# Patient Record
Sex: FEMALE | Race: WHITE | NOT HISPANIC OR LATINO | Employment: UNEMPLOYED | URBAN - METROPOLITAN AREA
[De-identification: names, ages, dates, MRNs, and addresses within clinical notes are randomized per-mention and may not be internally consistent; named-entity substitution may affect disease eponyms.]

---

## 2017-01-06 ENCOUNTER — TRANSFERRED RECORDS (OUTPATIENT)
Dept: HEALTH INFORMATION MANAGEMENT | Facility: CLINIC | Age: 62
End: 2017-01-06

## 2017-01-06 DIAGNOSIS — G25.0 ESSENTIAL TREMOR: Primary | ICD-10-CM

## 2017-02-08 ENCOUNTER — DOCUMENTATION ONLY (OUTPATIENT)
Dept: NEUROSURGERY | Facility: CLINIC | Age: 62
End: 2017-02-08

## 2017-02-21 ENCOUNTER — TELEPHONE (OUTPATIENT)
Dept: NEUROSURGERY | Facility: CLINIC | Age: 62
End: 2017-02-21

## 2017-02-21 NOTE — TELEPHONE ENCOUNTER
Received VM from pt letting me know she received the preop packet. Left her a VM and asked her to call me at her earliest convenience so we can review the contents of the packet and any questions she may have. Left my direct phone number.

## 2017-02-24 ENCOUNTER — CARE COORDINATION (OUTPATIENT)
Dept: NEUROSURGERY | Facility: CLINIC | Age: 62
End: 2017-02-24

## 2017-02-24 NOTE — PROGRESS NOTES
Pre-op Teaching by phone           Pre-op folder with specific written instructions    Surgeries: DBS lead placement/IPG placement  Dates/times: 3/14/17 at 8:00, 3/24/17 at 9:00  Surgeon: Dr. Javier    Discussed pre-op routine and requirements to include:  surgical procedure, post-op recovery and expectations, need for H&P/PAC (3/13/17), NPO prior to OR, pre-op antibacterial showers, pain control and importance of follow-up visits.  Surgery scheduling will coordinate OR time/date and update patient as appropriate.  3C will call with more instructions 24-48 hour pre-op.   Ample time was provided for patient questions and in-depth discussion of topics of heightened interest.  Antibacterial soap solution for each surgery will be given to patient; discussed specific instructions for use. Pt to hold tremor medications (primidone and gabapentin) the morning of 3/14/17. She may take it prior to the 3/24 surgery.  Patient verbalized understanding of instructions.  Approximately 20 minutes spent with patient  discussing and reviewing.

## 2017-03-09 ENCOUNTER — ANESTHESIA EVENT (OUTPATIENT)
Dept: SURGERY | Facility: CLINIC | Age: 62
DRG: 027 | End: 2017-03-09
Payer: COMMERCIAL

## 2017-03-13 ENCOUNTER — OFFICE VISIT (OUTPATIENT)
Dept: SURGERY | Facility: CLINIC | Age: 62
End: 2017-03-13

## 2017-03-13 ENCOUNTER — ALLIED HEALTH/NURSE VISIT (OUTPATIENT)
Dept: SURGERY | Facility: CLINIC | Age: 62
End: 2017-03-13

## 2017-03-13 VITALS
HEART RATE: 100 BPM | SYSTOLIC BLOOD PRESSURE: 112 MMHG | TEMPERATURE: 98.9 F | DIASTOLIC BLOOD PRESSURE: 76 MMHG | HEIGHT: 67 IN | BODY MASS INDEX: 29.66 KG/M2 | RESPIRATION RATE: 14 BRPM | OXYGEN SATURATION: 96 % | WEIGHT: 189 LBS

## 2017-03-13 DIAGNOSIS — Z01.818 PREOP EXAMINATION: Primary | ICD-10-CM

## 2017-03-13 DIAGNOSIS — Z01.818 PREOPERATIVE EVALUATION TO RULE OUT SURGICAL CONTRAINDICATION: ICD-10-CM

## 2017-03-13 DIAGNOSIS — Z01.818 PREOPERATIVE EVALUATION TO RULE OUT SURGICAL CONTRAINDICATION: Primary | ICD-10-CM

## 2017-03-13 DIAGNOSIS — G25.0 ESSENTIAL TREMOR: ICD-10-CM

## 2017-03-13 LAB
ALBUMIN UR-MCNC: NEGATIVE MG/DL
ANION GAP SERPL CALCULATED.3IONS-SCNC: 10 MMOL/L (ref 3–14)
APPEARANCE UR: CLEAR
APTT PPP: 25 SEC (ref 22–37)
BACTERIA #/AREA URNS HPF: ABNORMAL /HPF
BILIRUB UR QL STRIP: NEGATIVE
BUN SERPL-MCNC: 12 MG/DL (ref 7–30)
CALCIUM SERPL-MCNC: 8.7 MG/DL (ref 8.5–10.1)
CHLORIDE SERPL-SCNC: 104 MMOL/L (ref 94–109)
CO2 SERPL-SCNC: 27 MMOL/L (ref 20–32)
COLOR UR AUTO: ABNORMAL
CREAT SERPL-MCNC: 0.52 MG/DL (ref 0.52–1.04)
ERYTHROCYTE [DISTWIDTH] IN BLOOD BY AUTOMATED COUNT: 13.1 % (ref 10–15)
GFR SERPL CREATININE-BSD FRML MDRD: ABNORMAL ML/MIN/1.7M2
GLUCOSE SERPL-MCNC: 104 MG/DL (ref 70–99)
GLUCOSE UR STRIP-MCNC: NEGATIVE MG/DL
HCT VFR BLD AUTO: 38 % (ref 35–47)
HGB BLD-MCNC: 13.3 G/DL (ref 11.7–15.7)
HGB UR QL STRIP: NEGATIVE
INR PPP: 0.99 (ref 0.86–1.14)
KETONES UR STRIP-MCNC: NEGATIVE MG/DL
LEUKOCYTE ESTERASE UR QL STRIP: ABNORMAL
MCH RBC QN AUTO: 30.2 PG (ref 26.5–33)
MCHC RBC AUTO-ENTMCNC: 35 G/DL (ref 31.5–36.5)
MCV RBC AUTO: 86 FL (ref 78–100)
MUCOUS THREADS #/AREA URNS LPF: PRESENT /LPF
NITRATE UR QL: NEGATIVE
PH UR STRIP: 6 PH (ref 5–7)
PLATELET # BLD AUTO: 194 10E9/L (ref 150–450)
POTASSIUM SERPL-SCNC: 4.2 MMOL/L (ref 3.4–5.3)
RBC # BLD AUTO: 4.4 10E12/L (ref 3.8–5.2)
RBC #/AREA URNS AUTO: 1 /HPF (ref 0–2)
SODIUM SERPL-SCNC: 141 MMOL/L (ref 133–144)
SP GR UR STRIP: 1.01 (ref 1–1.03)
SQUAMOUS #/AREA URNS AUTO: <1 /HPF (ref 0–1)
URN SPEC COLLECT METH UR: ABNORMAL
UROBILINOGEN UR STRIP-MCNC: 0 MG/DL (ref 0–2)
WBC # BLD AUTO: 6.1 10E9/L (ref 4–11)
WBC #/AREA URNS AUTO: 1 /HPF (ref 0–2)

## 2017-03-13 NOTE — H&P
Pre-Operative H & P     Date of Encounter: 3/13/2017  Primary Care Physician:  Zachary Holguin    CC: Essential tremor.    HPI:  Hilary Cornelius is a 62 year old female who presents for pre-operative H & P in preparation for Patricia-Assisted Left Side Deep Brain Stimulator Placement, Phase I, Placement of Deep Brain Stimulator Electrode, Target Left Ventral Intermediate Nucleus of the Thalamus, with Microelectrode Recording on 3/14/17 with Dr. Henok Javier at [facility].     The patient was evaluated by Dr. Javier on 11/10/16 in regards to a long history of essential tremor that has not responded to treatment with medications.  The tremor involves her head, hand, and her voice.  It makes it difficult for her to eat, drink, or write her name.  At the 11/10 visit, they discussed the phases of the DBS implantation surgery in detail.  Her case was discussed at the Movement Disorder Consensus Group Meeting and it was determined that she is a candidate for the surgery.  Arrangements are now being made for the above procedures.  History is obtained from the patient and the medical record.    Past Medical History:  Past Medical History   Diagnosis Date     Chronic pain 11/8/2016     Cognitive disorder 12/4/2016     2016 evaluation   IMPRESSIONS AND RECOMMENDATIONS  Current results indicate overall intellectual functioning estimated fall in the mildly impaired range, marginally below premorbid estimates of low average based on single word reading abilities. She demonstrates a relative inefficiency in learning of new material, but retains information quite well once she has learned it. Visual-spatial abilities     Controlled type 2 diabetes mellitus without complication (H) 12/19/2008     Exercise-induced asthma      Gastroesophageal reflux disease 3/30/2015     XR ESOPHAGRAM7/8/2016  [facility]  Result Narrative  This document is currently in Final Status  Exam Accession#  [id number]  XR ESOPHAGRAM  CLINICAL INFORMATION: Dysphagia;   COMPARISON: None.  FINDINGS: The esophagus is normal in course and caliber in this post gastric bypass patient. No intrinsic or extrinsic mass lesions are identified. No strictures. There is a small hiatal hernia. Gastroesophageal reflux is observed during this examination  with numerous tertiary contractions and delayed esophageal clearance.  IMPRESSION:  1. Gastroesophageal reflux with numerous tertiary contractions and delayed esophageal clearance. 2. Sliding hiatal hernia in this post gastric bypass patient.  Dictated By: Dandre Wyatt MD 7/8/2016 9:47 AM Edited By: MIKE 7/8/2016 10:02 AM  Electronically Signed: Dandre Wyatt MD 7/8/2016 4:24 PM   Status Results Details         H/O bariatric surgery 11/08/2016     Headache disorder 11/05/2016     Heart murmur 11/8/2016     Rheumatic fever 11/8/2016     Tremor 11/5/2016     Past Surgical History:  Past Surgical History   Procedure Laterality Date     Thoracic spine surgery       Colonoscopy       Cholecystectomy       Appendectomy       Tubal ligation       Gastric bypass       Hx of Blood transfusions/reactions: Denies.     Hx of abnormal bleeding or anti-platelet use: Denies.    Menstrual history: No LMP recorded. Patient is postmenopausal.    Steroid use in the last year: Denies.    Personal or FH of difficulty with anesthesia: Denies.    Prior to admission medications  Current Outpatient Prescriptions   Medication Sig Dispense Refill     diphenhydrAMINE-acetaminophen (TYLENOL PM)  MG tablet Take 1 tablet by mouth as needed       primidone (MYSOLINE) 250 MG tablet 1 in am and 2 in the evening       albuterol (PROAIR HFA, PROVENTIL HFA, VENTOLIN HFA) 108 (90 BASE) MCG/ACT inhaler Inhale 2 puffs into the lungs every 6 hours       gabapentin (NEURONTIN) 600 MG tablet Take 600 mg by mouth 3 times daily       calcium carbonate (OS-GISELA 600 MG [place]. [place]) 600 MG tablet Take 600 mg by mouth daily        Multiple vitamin TABS Take 1 tablet by mouth daily       [DISCONTINUED] albuterol (VENTOLIN HFA) 108 (90 BASE) MCG/ACT inhaler INHALE 1 TO 2 PUFFS INTO THE LUNGS EVERY 6 HOURS AS NEEDED FOR SHORTNESS OF BREATH. SHAKE BEFORE USING.       cyabnocobalamin (VITAMIN B-12) 2500 MCG sublingual tablet Place 2,500 mcg under the tongue daily       folic acid (FOLVITE) 400 MCG tablet Take 400 mcg by mouth daily       Allergies  Allergies   Allergen Reactions     Asa [Aspirin] Unknown     Bee Venom Swelling     Penicillins      rash     Social History  Social History     Social History     Marital status:      Spouse name: N/A     Number of children: N/A     Years of education: N/A     Occupational History     Not on file.     Social History Main Topics     Smoking status: Never Smoker     Smokeless tobacco: Never Used     Alcohol use 0.0 oz/week     0 Standard drinks or equivalent per week      Comment: social     Drug use: Not on file     Sexual activity: Not on file     Other Topics Concern     Not on file     Social History Narrative    from [facility].  to Adilson Siddiqui        [facility] HISTORY: The patient was born in [place], [place]. The patient was educated formally through 12 years. The patient is presently disabled due to her spinal difficulties. The patient has been  40 years. The patient has 3 children of that marriage. The patient does not use tobacco. The patient has social use of alcohol. The patient has no history of drug or intravenous drug use or abuse.         FAMILY HISTORY: A strong family history of tremor with mother having tremor, but also a strong paternal family history of tremor with father with multiple cousins. Patient does have one cousin with Parkinson disease. The patients father suffered with dementia of the Alzheimer type.     2 Daughters and son    Daughter lives 20 miles away in [place] from her her    Daughter lives in [place]    Son lives in [place] [facility]  "[place]        Staying at the [facility]     Family History  Family History   Problem Relation Age of Onset     Tremor Mother      Cardiomyopathy Mother      had surgery at [facility]     Dementia Father      Alzheimer's disease     Parkinsonism Other      paternal aunt's son - cousin     Tremor Daughter      Review of Systems  Functional status: Independent in ADL's.  >4 METS.     The complete review of systems is negative other than noted in the HPI or here.   Constitutional: Denies recent changes in weight, sleeping patterns, or fevers/chills.  Eyes: Glasses for vision correction.  No recent vision changes.  EENT: Denies recent changes in hearing, mouth pain, or difficulty swallowing.  Cardiovascular: Denies chest pain, CORCORAN or orthopnea, or palpitations.  Respiratory: Denies shortness of breath or significant cough.    GI: Denies nausea/vomiting or diarrhea/constipation.    : Denies dysuria.    Musculoskeletal: Denies joint pain or swelling, with the exception of chronic back and right knee pain.    Skin: Denies rashes or wounds.    Hematologic: Denies easy bruising or bleeding.    Neurologic: Denies migraines, seizures, dizziness, numbness/tingling.  Psychiatric: Denies changes in mood or affect.      /76  Pulse 100  Temp 98.9  F (37.2  C) (Oral)  Resp 14  Ht 1.689 m (5' 6.5\")  Wt 85.7 kg (189 lb)  SpO2 96%  BMI 30.05 kg/m2    189 lbs 0 oz  5' 6.5\"   Body mass index is 30.05 kg/(m^2).    Physical Exam  Constitutional: Patient awake, seated upright in a chair, in no apparent distress.  Appears stated age.  Eyes: Pupils equal, round and reactive to light.  Extra ocular muscles intact. Sclera clear.  Conjunctiva normal.  HENT: Head normocephalic.  Oral pharynx intact with moist mucous membranes.  Dentition with multiple missing teeth.  No thyromegaly appreciated.   Respiratory: Lung sounds clear to auscultation bilaterally.  No rales, rhonchi, or wheezing noted.    Cardiovascular: S1, S2, regular " rate and rhythm.  No rubs, or gallops noted. 1/6 murmur noted.  No carotid bruits auscultated.  Radial and pedal pulses palpable, bilaterally.  Trace pedal edema noted, bilaterally.  GI: Bowel sounds present.  Abdomen rounded, soft, non-tender to light palpation.  No hepatosplenomegaly or masses palpated.   Genitourinary: Exam deferred.  Lymph/Hematologic: No cervical or supraclavicular lymphadenopathy noted.  No excessive bruising noted.    Skin: Color appropriate for race, warm, dry.  No rashes or wounds at anticipated surgical site.   Musculoskeletal: Full extension of the neck.  No redness, warmth, or swelling of the joints noted. Gross motor strength is normal.    Neurologic: Persistent tremor of the head and both upper extremities noted.  Alert, oriented to name, place and time. Cranial nerves II-XII are grossly intact. Gait is normal.      Neuropsychiatric: Calm, cooperative. Normal affect.     Labs:  3/13/17: WBC 6.1; Hgb 13.3; Hct 38; Plt 194; 0.99 INR; 25 PTT  3/13/17: Na 141; K 4.2; Cl 104; Glu 104; BUN 12; Cr 0.52; Ca 8.7    Imagin2015 Myocardial Perfusion:  FINDINGS: Myocardial perfusion scintigraphy is performed utilizing 11 and 30.8 mCi technetium 99m sestamibi IV for rest and stress doses respectively. Lexiscan infusion was performed. Pre and post infusion heart rates are measured at 63 and 82 beats per   minute respectively. A blood pressure is measured at 127/81 mmHg. During the infusion, the patient experienced chest tightness which resolved with 50 mg Aminophyllin IV. Left ventricular ejection fraction is calculated at 67%. No focal wall motion   abnormalities. No definite fixed or reversible perfusion abnormalities.    IMPRESSION: Low-risk examination. Normal left ventricular ejection fraction without a definite fixed or reversible perfusion abnormality.    Lab results were personally reviewed by this provider.      Outside records from [facility] reviewed.    Assessment and  Plan  Hilary Cornelius is a 62 year old female scheduled to undergo Patricia-Assisted Left Side Deep Brain Stimulator Placement, Phase I, Placement of Deep Brain Stimulator Electrode, Target Left Ventral Intermediate Nucleus of the Thalamus, with Microelectrode Recording on 3/14/17 with Dr. Henok Javier.    She has the following specific operative considerations:   1.  Hiatal hernia with occasional GERD symptoms: Consider use of RSI techniques should advanced airway maneuvers be required.  2.  Asthma: Consider administration of a bronchodilator in the perioperative setting, if needed.    3.  Essential tremor involving the head and upper extremities: Recommend that extra caution be exercised during transfers and with positioning in order to prevent injury.    4.  History of rheumatic fever, presence of a cardiac murmur: SBE prophylaxis recommended.  5.  Diabetes: Recommend close monitoring of blood glucose levels throughout the perioperative period.  6.  Labs as ordered by Dr. Javier.    Revised Cardiac Risk Index: 0.4% risk of major adverse cardiac event.  Anesthesia considerations: Refer to PAC assessment in the anesthesia records.  VTE risk: 1.8%  ANNA MARIE risk: Low  PONV risk score= 2.  (If > 2, anti-emetic intervention is recommended.)    Patient was discussed with Dr. Murillo.    Mimi Putnam NP  Preoperative Assessment Center  [facility]  Phone: [phone]  Fax: [phone]

## 2017-03-13 NOTE — PATIENT INSTRUCTIONS
Preparing for Your Surgery      Name:  Hilary Cornelius   MRN:  [id number]   :  1955   Today's Date:  3/13/2017     Arriving for surgery:  Surgery date:  3/14/17  Surgery time:  08:00 am  Arrival time:  06:00 am  Please come to:       [facility] [address]  [address] [place]  [address]    -   parking is available in front of the hospital from 5:15 am to 8:00 pm    -  Stop at the Information Desk in the lobby    -   Inform the information person that you are here for surgery. An escort to 3c will be provided. If you would not like an escort, please proceed to 3C on the 3rd floor. [phone]     What can I eat or drink?  -  You may have solid food or milk products until 8 hours prior to your surgery.  -  You may have water, apple juice or 7up/Sprite until 2 hours prior to your surgery.    Which medicines can I take?  -  Do NOT take these medications in the morning, the day of surgery:  Aspirin and ibuprofen x 7 days before surgery, supplements, Parkinson's medications if normally taken in the morning.    -  Please take these medications the day of surgery:  Tylenol if needed, gabapentin if normally taken in the morning.  Use albuterol inhaler and bring to surgery.    How do I prepare myself?  -  Take two showers: one the night before surgery; and one the morning of surgery.         Use Scrubcare or Hibiclens to wash from neck down.  You may use your own shampoo and conditioner. No other hair products.   -  Do NOT use lotion, powder, deodorant, or antiperspirant the day of your surgery.  -  Do NOT wear any makeup, fingernail polish or jewelry.  -  Begin using Incentive Spirometer 1 week prior to surgery.  Use 4 times per day, up to 5-10 breaths each time.  Bring Incentive Spirometer to hospital.  -Do not bring your own medications to the hospital, except for inhalers and eye drops.  -  Bring your ID and insurance card.    Questions or Concerns:  If you have  questions or concerns, please call the  Preoperative Assessment Center, Monday-Friday 7AM-7PM:  [phone]

## 2017-03-13 NOTE — MR AVS SNAPSHOT
After Visit Summary   3/13/2017    Hilary Cornelius    MRN: [id number]           Patient Information     Date Of Birth          1955        Visit Information        Provider Department      3/13/2017 2:00 PM Rn, [facility] Preoperative Assessment Center        Care Instructions    Preparing for Your Surgery      Name:  Hilary Cornelius   MRN:  [id number]   :  1955   Today's Date:  3/13/2017     Arriving for surgery:  Surgery date:  3/14/17  Surgery time:  08:00 am  Arrival time:  06:00 am  Please come to:       [facility] [address]  [address] [id number]    -   parking is available in front of the hospital from 5:15 am to 8:00 pm    -  Stop at the Information Desk in the lobby    -   Inform the information person that you are here for surgery. An escort to 3c will be provided. If you would not like an escort, please proceed to 3C on the 3rd floor. [phone]     What can I eat or drink?  -  You may have solid food or milk products until 8 hours prior to your surgery.  -  You may have water, apple juice or 7up/Sprite until 2 hours prior to your surgery.    Which medicines can I take?  -  Do NOT take these medications in the morning, the day of surgery:  Aspirin and ibuprofen x 7 days before surgery, supplements, Parkinson's medications if normally taken in the morning.    -  Please take these medications the day of surgery:  Tylenol if needed, gabapentin if normally taken in the morning.  Use albuterol inhaler and bring to surgery.    How do I prepare myself?  -  Take two showers: one the night before surgery; and one the morning of surgery.         Use Scrubcare or Hibiclens to wash from neck down.  You may use your own shampoo and conditioner. No other hair products.   -  Do NOT use lotion, powder, deodorant, or antiperspirant the day of your surgery.  -  Do NOT wear any makeup, fingernail polish or jewelry.  -  Begin  using Incentive Spirometer 1 week prior to surgery.  Use 4 times per day, up to 5-10 breaths each time.  Bring Incentive Spirometer to hospital.  -Do not bring your own medications to the hospital, except for inhalers and eye drops.  -  Bring your ID and insurance card.    Questions or Concerns:  If you have questions or concerns, please call the  Preoperative Assessment Center, Monday-Friday 7AM-7PM:  [phone]                  Follow-ups after your visit        Your next 10 appointments already scheduled     Mar 13, 2017  3:30 PM CDT   (Arrive by 3:15 PM)   PAC Anesthesia Consult with  Pac Anesthesiologist   [facility] Preoperative Assessment Center ([facility])    [address]  4th Floor  [facility] [id number] [phone]            Mar 14, 2017   Procedure with Henok Javier MD   [facility], Same Day Surgery (--)    [address] [id number] [phone]            Mar 14, 2017  8:40 AM CDT   CT HEAD W/O CONTRAST with UUCT1   [facility], [place], [place] ([facility])    [address] [id number] [phone]           Please bring any scans or X-rays taken at other hospitals, if similar tests were done. Also bring a list of your medicines, including vitamins, minerals and over-the-counter drugs. It is safest to leave personal items at home.  Be sure to tell your doctor:   If you have any allergies.   If there s any chance you are pregnant.   If you are breastfeeding.   If you have any special needs.  You do not need to do anything special to prepare.  Please wear loose clothing, such as a sweat suit or jogging clothes. Avoid snaps, zippers and other metal. We may ask you to undress and put on a hospital gown.            Mar 24, 2017   Procedure with Henok Javier MD   [facility], Same Day Surgery (--)    [address] [id number] [phone]              Future  tests that were ordered for you today     Open Future Orders        Priority Expected Expires Ordered    CBC with platelets Routine  3/13/2018 3/13/2017    Basic metabolic panel Routine  3/13/2018 3/13/2017    UA with Microscopic reflex to Culture Routine  3/13/2018 3/13/2017    INR Routine  3/13/2018 3/13/2017    Partial thromboplastin time Routine  3/13/2018 3/13/2017            Who to contact     Please call your clinic at [phone] to:    Ask questions about your health    Make or cancel appointments    Discuss your medicines    Learn about your test results    Speak to your doctor   If you have compliments or concerns about an experience at your clinic, or if you wish to file a complaint, please contact [facility] Patient Relations at [phone] or email us at [email or web address]         Additional Information About Your Visit        [email or web address] Information     [employer] gives you secure access to your electronic health record. If you see a primary care provider, you can also send messages to your care team and make appointments. If you have questions, please call your primary care clinic.  If you do not have a primary care provider, please call [phone] and they will assist you.      [employer] is an electronic gateway that provides easy, online access to your medical records. With [employer], you can request a clinic appointment, read your test results, renew a prescription or communicate with your care team.     To access your existing account, please contact your [facility] or call [phone] for assistance.        Care EveryWhere ID     This is your Care EveryWhere ID. This could be used by other organizations to access your [facility] medical records  [id number]         Blood Pressure from Last 3 Encounters:   03/13/17 112/76   11/10/16 147/78   11/09/16 146/75    Weight from Last 3 Encounters:   03/13/17 85.7 kg (189 lb)   11/10/16 89.4  kg (197 lb)   11/09/16 89.4 kg (197 lb)              Today, you had the following     No orders found for display       Primary Care Provider Office Phone # Fax #    Zachary Holguin [phone]       [facility] [address] [address]        Thank you!     Thank you for choosing [facility] PREOPERATIVE ASSESSMENT CENTER  for your care. Our goal is always to provide you with excellent care. Hearing back from our patients is one way we can continue to improve our services. Please take a few minutes to complete the written survey that you may receive in the mail after your visit with us. Thank you!             Your Updated Medication List - Protect others around you: Learn how to safely use, store and throw away your medicines at www.disposemymeds.org.          This list is accurate as of: 3/13/17  3:02 PM.  Always use your most recent med list.                   Brand Name Dispense Instructions for use    albuterol 108 (90 BASE) MCG/ACT Inhaler    PROAIR HFA/PROVENTIL HFA/VENTOLIN HFA     Inhale 2 puffs into the lungs every 6 hours       calcium carbonate 600 MG tablet    OS-GISELA 600 mg [place]. [place]     Take 600 mg by mouth daily       cyabnocobalamin 2500 MCG sublingual tablet    VITAMIN B-12     Place 2,500 mcg under the tongue daily       diphenhydrAMINE-acetaminophen  MG tablet    TYLENOL PM     Take 1 tablet by mouth as needed       folic acid 400 MCG tablet    FOLVITE     Take 400 mcg by mouth daily       gabapentin 600 MG tablet    NEURONTIN     Take 600 mg by mouth 3 times daily       Multiple vitamin Tabs      Take 1 tablet by mouth daily       primidone 250 MG tablet    MYSOLINE     1 in am and 2 in the evening

## 2017-03-13 NOTE — ANESTHESIA PREPROCEDURE EVALUATION
"Anesthesia Evaluation     . Pt has had prior anesthetic. Type: General and MAC    No history of anesthetic complications     ROS/MED HX    ENT/Pulmonary:     (+)Intermittent asthma Last exacerbation: 1 month ago,Treatment: Inhaler prn,  , . .    Neurologic:     (+)other neuro Essential tremor, effecting the head and both arms    Cardiovascular:     (+) ----. : . . . :. valvular problems/murmurs History of rheumatic fever:. No previous cardiac testing      (-) taking anticoagulants/antiplatelets   METS/Exercise Tolerance:  >4 METS   Hematologic:  - neg hematologic  ROS       Musculoskeletal:   (+) , , other musculoskeletal- 3rd digit in the left hand, S/P joint replacement, so it does not fully extend or bend.  Chronic back and right knee pain.  +      GI/Hepatic:     (+) GERD Other, hiatal hernia, Other GI/Hepatic History of bariatric surgery      Renal/Genitourinary:  - ROS Renal section negative       Endo:     (+) type II DM Not using insulin - not using insulin pump Normal glucose range: Diet controlled, states that blood glucose levels are \"good\" not previously admitted for DM/DKA .      Psychiatric:  - neg psychiatric ROS       Infectious Disease:   (+) SBE Prophylaxis,       Malignancy:      - no malignancy   Other:    (+) H/O Chronic Pain,             Physical Exam      Airway   Mallampati: III  TM distance: >3 FB  Neck ROM: full    Dental   (+) missing    Cardiovascular   Rhythm and rate: regular and normal  (+) murmur   (-) carotid bruit is not present and no peripheral edema  PE comment: 1/6 cardiac murmur    Pulmonary    breath sounds clear to auscultation    Other findings: 3/13/17: WBC 6.1; Hgb 13.3; Hct 38; Plt 194; 0.99 INR; 25 PTT  3/13/17: Na 141; K 4.2; Cl 104; Glu 104; BUN 12; Cr 0.52; Ca 8.7    7/8/2015 Myocardial Perfusion:  FINDINGS: Myocardial perfusion scintigraphy is performed utilizing 11 and 30.8 mCi technetium 99m sestamibi IV for rest and stress doses respectively. Lexiscan infusion was " performed. Pre and post infusion heart rates are measured at 63 and 82 beats per   minute respectively. A blood pressure is measured at 127/81 mmHg. During the infusion, the patient experienced chest tightness which resolved with 50 mg Aminophyllin IV. Left ventricular ejection fraction is calculated at 67%. No focal wall motion   abnormalities. No definite fixed or reversible perfusion abnormalities.    IMPRESSION: Low-risk examination. Normal left ventricular ejection fraction without a definite fixed or reversible perfusion abnormality.             PAC Discussion and Assessment    ASA Classification: 3  Case is suitable for: [place]  Anesthetic techniques and relevant risks discussed: MAC with GA as backup  Invasive monitoring and risk discussed: Yes  Types:   Possibility and Risk of blood transfusion discussed: Yes  NPO instructions given:   Additional anesthetic preparation and risks discussed:   Needs early admission to pre-op area:   Other:     PAC Resident/NP Anesthesia Assessment:  Hilary Cornelius is a 62 year old female scheduled to undergo Patricia-Assisted Left Side Deep Brain Stimulator Placement, Phase I, Placement of Deep Brain Stimulator Electrode, Target Left Ventral Intermediate Nucleus of the Thalamus, with Microelectrode Recording on 3/14/17 with Dr. Henok Javier.    She has the following specific operative considerations:   1.  Hiatal hernia with occasional GERD symptoms: Consider use of RSI techniques should advanced airway maneuvers be required.  2.  Asthma: Consider administration of a bronchodilator in the perioperative setting, if needed.    3.  Essential tremor involving the head and upper extremities: Recommend that extra caution be exercised during transfers and with positioning in order to prevent injury.    4.  History of rheumatic fever, presence of a cardiac murmur: SBE prophylaxis recommended.  5.  Diabetes: Recommend close monitoring of blood glucose levels throughout the  perioperative period.  6.  Labs as ordered by Dr. Javier.    Revised Cardiac Risk Index: 0.4% risk of major adverse cardiac event.  Anesthesia considerations: Refer to PAC assessment in the anesthesia records.  VTE risk: 1.8%  ANNA MARIE risk: Low  PONV risk score= 2.  (If > 2, anti-emetic intervention is recommended.)      Reviewed and Signed by PAC Mid-Level Provider/Resident  Mid-Level Provider/Resident: Mimi Putnam CNP  Date: 3/13/17  Time: 1620    Attending Anesthesiologist Anesthesia Assessment:  STAFF:  62 y.o. woman with significant bilateral tremor disease for DBS electrode placement by Dr. Javier using MAC anesthesia.   History summarized above.   #Hx of rheumatic fever, requires endocardial coverage  # Recent negative cardiac work up  Instructions given and questions answered.   Final plans by anesthesiology team on DOS.   ---rcp      Reviewed and Signed by PAC Anesthesiologist  Anesthesiologist: rcp  Date: 3/13  Time:   Pass/Fail: Pass  Disposition:     PAC Pharmacist Assessment:        Pharmacist:   Date:   Time:      Anesthesia Plan      History & Physical Review      ASA Status:  2 .    NPO Status:  > 8 hours    Plan for MAC with Intravenous induction. Maintenance will be TIVA.    PONV prophylaxis:  Ondansetron (or other 5HT-3)  Additional equipment: 2nd IV      Postoperative Care  Postoperative pain management:  IV analgesics and Oral pain medications.      Consents        ANESTHESIA PREOP EVALUATION    Procedure: Procedure(s):  Stealth Assisted Left Side Deep Brain Stimulator Placement, Phase I, Placement Of Deep Brain Stimulator Electrode, Target Left Ventral Intermediate Nucleus Of The Thalamus, With Microelectrode Recording - Wound Class:     HPI: Hilary Cornelius is a 62 year old female with diet controlled DM2, GERD, and essential tremor presenting for above procedure.     PMHx/PSHx/ROS:  Past Medical History   Diagnosis Date     Chronic pain 11/8/2016     Cognitive disorder 12/4/2016     2016  evaluation   IMPRESSIONS AND RECOMMENDATIONS  Current results indicate overall intellectual functioning estimated fall in the mildly impaired range, marginally below premorbid estimates of low average based on single word reading abilities. She demonstrates a relative inefficiency in learning of new material, but retains information quite well once she has learned it. Visual-spatial abilities     Controlled type 2 diabetes mellitus without complication (H) 12/19/2008     Gastroesophageal reflux disease 3/30/2015     XR ESOPHAGRAM7/8/2016    Result Narrative  This document is currently in Final Status  Exam Accession# [id number]  XR ESOPHAGRAM  CLINICAL INFORMATION: Dysphagia;   COMPARISON: None.  FINDINGS: The esophagus is normal in course and caliber in this post gastric bypass patient. No intrinsic or extrinsic mass lesions are identified. No strictures. There is a small hiatal hernia. Gastroesophageal reflux is observed during this examination  with numerous tertiary contractions and delayed esophageal clearance.  IMPRESSION:  1. Gastroesophageal reflux with numerous tertiary contractions and delayed esophageal clearance. 2. Sliding hiatal hernia in this post gastric bypass patient.  Dictated By: Dandre Wyatt MD 7/8/2016 9:47 AM Edited By: MIKE 7/8/2016 10:02 AM  Electronically Signed: Dandre Wyatt MD 7/8/2016 4:24 PM   Status Results Details         H/O bariatric surgery 11/08/2016     Headache disorder 11/05/2016     Heart murmur 11/8/2016     Rheumatic fever 11/8/2016     Tremor 11/5/2016       Past Surgical History   Procedure Laterality Date     Thoracic spine surgery       Colonoscopy       Cholecystectomy       Appendectomy       Tubal ligation       Gastric bypass           Past Anes Hx: No personal or family h/o anesthesia problems    Soc Hx:   Social History   Substance Use Topics     Smoking status: Never Smoker     Smokeless tobacco: Never Used     Alcohol use 0.0 oz/week     0 Standard  drinks or equivalent per week      Comment: social       Allergies:   Allergies   Allergen Reactions     Bee Venom Swelling     Penicillins      rash       Meds:   No prescriptions prior to admission.       Current Outpatient Prescriptions   Medication Sig Dispense Refill     calcium carbonate (OS-GISELA 600 MG [place]. [place]) 600 MG tablet Take 600 mg by mouth daily       clonazePAM (KLONOPIN) 0.5 MG tablet Take 1 tablet by mouth At Bedtime       lisinopril (PRINIVIL,ZESTRIL) 5 MG tablet Take 5 mg by mouth daily       Multiple vitamin TABS Take 1 tablet by mouth daily       albuterol (VENTOLIN HFA) 108 (90 BASE) MCG/ACT inhaler INHALE 1 TO 2 PUFFS INTO THE LUNGS EVERY 6 HOURS AS NEEDED FOR SHORTNESS OF BREATH. SHAKE BEFORE USING.       HYDROcodone-acetaminophen (NORCO) 5-325 MG per tablet 1 Tablet daily if needed. TAKE FOR PAIN.  ACETAMINOPHEN SHOULD BE LIMITED TO 4000MG PER DAY.       omeprazole (PRILOSEC) 20 MG capsule Take 20 mg by mouth daily as needed       primidone (MYSOLINE) 250 MG tablet 1 in am and 2 in the evening       albuterol (PROAIR HFA, PROVENTIL HFA, VENTOLIN HFA) 108 (90 BASE) MCG/ACT inhaler Inhale 2 puffs into the lungs every 6 hours       cyabnocobalamin (VITAMIN B-12) 2500 MCG sublingual tablet Place 2,500 mcg under the tongue daily       folic acid (FOLVITE) 400 MCG tablet Take 400 mcg by mouth daily       gabapentin (NEURONTIN) 600 MG tablet Take 600 mg by mouth 3 times daily         Physical Exam:  Vitals: There were no vitals taken for this visit.  BMI= There is no height or weight on file to calculate BMI.      Labs:  UPT: No results found for: HCGQUANT      BMP:  No results for input(s): NA, POTASSIUM, CHLORIDE, CO2, BUN, CR, GLC, GISELA in the last 03465 hours.  CBC:   No results for input(s): WBC, RBC, HGB, HCT, MCV, MCH, MCHC, RDW, PLT in the last 13110 hours.  Coags:  No results for input(s): INR, PTT, FIBR in the last 24199 hours.    Assessment/Plan:  - ASA 2  - MAC with standard ASA monitors,  TIVA  - Pre-induction:   - Versed 0 mg   - PIVx2   - No arterial line, No central line   - Antibiotics per surgeon  - Maintenance:   - Methohexital vs. Propofol vs. remifentanil   - Analgesia: Fentanyl   - Fluids: LR 1 mL/kg/hr maint, < 1L total volume  - Emergence:   - PONV prophylaxis: Marino Russ Jr., MD  Anesthesia Resident - [facility]    3/13/2017  9:49 AM

## 2017-03-14 ENCOUNTER — HOSPITAL ENCOUNTER (OUTPATIENT)
Dept: CT IMAGING | Facility: CLINIC | Age: 62
DRG: 027 | End: 2017-03-14
Attending: NEUROLOGICAL SURGERY | Admitting: NEUROLOGICAL SURGERY
Payer: COMMERCIAL

## 2017-03-14 ENCOUNTER — HOSPITAL ENCOUNTER (INPATIENT)
Facility: CLINIC | Age: 62
LOS: 1 days | Discharge: HOME | DRG: 027 | End: 2017-03-15
Attending: NEUROLOGICAL SURGERY | Admitting: NEUROLOGICAL SURGERY
Payer: COMMERCIAL

## 2017-03-14 ENCOUNTER — ANESTHESIA (OUTPATIENT)
Dept: SURGERY | Facility: CLINIC | Age: 62
DRG: 027 | End: 2017-03-14
Payer: COMMERCIAL

## 2017-03-14 ENCOUNTER — APPOINTMENT (OUTPATIENT)
Dept: GENERAL RADIOLOGY | Facility: CLINIC | Age: 62
DRG: 027 | End: 2017-03-14
Attending: NEUROLOGICAL SURGERY
Payer: COMMERCIAL

## 2017-03-14 DIAGNOSIS — G25.0 ESSENTIAL TREMOR: ICD-10-CM

## 2017-03-14 DIAGNOSIS — G25.0 ESSENTIAL TREMOR: Primary | ICD-10-CM

## 2017-03-14 LAB
ABO + RH BLD: NORMAL
ABO + RH BLD: NORMAL
BLD GP AB SCN SERPL QL: NORMAL
BLOOD BANK CMNT PATIENT-IMP: NORMAL
GLUCOSE BLDC GLUCOMTR-MCNC: 144 MG/DL (ref 70–99)
SPECIMEN EXP DATE BLD: NORMAL

## 2017-03-14 PROCEDURE — S0077 INJECTION, CLINDAMYCIN PHOSP: HCPCS | Performed by: NEUROLOGICAL SURGERY

## 2017-03-14 PROCEDURE — 25000125 ZZHC RX 250: Performed by: STUDENT IN AN ORGANIZED HEALTH CARE EDUCATION/TRAINING PROGRAM

## 2017-03-14 PROCEDURE — 36000076 ZZH SURGERY LEVEL 6 EA 15 ADDTL MIN - UMMC: Performed by: NEUROLOGICAL SURGERY

## 2017-03-14 PROCEDURE — 27211024 ZZHC OR SUPPLY OTHER OPNP: Performed by: NEUROLOGICAL SURGERY

## 2017-03-14 PROCEDURE — 70450 CT HEAD/BRAIN W/O DYE: CPT

## 2017-03-14 PROCEDURE — 86901 BLOOD TYPING SEROLOGIC RH(D): CPT | Performed by: NEUROLOGICAL SURGERY

## 2017-03-14 PROCEDURE — 71000016 ZZH RECOVERY PHASE 1 LEVEL 3 FIRST HR: Performed by: NEUROLOGICAL SURGERY

## 2017-03-14 PROCEDURE — 4A10X4G MONITORING OF CENTRAL NERVOUS ELECTRICAL ACTIVITY, INTRAOPERATIVE, EXTERNAL APPROACH: ICD-10-PCS | Performed by: NEUROLOGICAL SURGERY

## 2017-03-14 PROCEDURE — 37000009 ZZH ANESTHESIA TECHNICAL FEE, EACH ADDTL 15 MIN: Performed by: NEUROLOGICAL SURGERY

## 2017-03-14 PROCEDURE — 27810325 ZZHC OR IMPLANT OTHER OPNP: Performed by: NEUROLOGICAL SURGERY

## 2017-03-14 PROCEDURE — 36415 COLL VENOUS BLD VENIPUNCTURE: CPT | Performed by: NEUROLOGICAL SURGERY

## 2017-03-14 PROCEDURE — S0077 INJECTION, CLINDAMYCIN PHOSP: HCPCS | Performed by: STUDENT IN AN ORGANIZED HEALTH CARE EDUCATION/TRAINING PROGRAM

## 2017-03-14 PROCEDURE — 27210794 ZZH OR GENERAL SUPPLY STERILE: Performed by: NEUROLOGICAL SURGERY

## 2017-03-14 PROCEDURE — C1897 LEAD, NEUROSTIM TEST KIT: HCPCS | Performed by: NEUROLOGICAL SURGERY

## 2017-03-14 PROCEDURE — 25000125 ZZHC RX 250: Performed by: NEUROLOGICAL SURGERY

## 2017-03-14 PROCEDURE — 36000074 ZZH SURGERY LEVEL 6 1ST 30 MIN - UMMC: Performed by: NEUROLOGICAL SURGERY

## 2017-03-14 PROCEDURE — 25000132 ZZH RX MED GY IP 250 OP 250 PS 637: Performed by: STUDENT IN AN ORGANIZED HEALTH CARE EDUCATION/TRAINING PROGRAM

## 2017-03-14 PROCEDURE — 40000170 ZZH STATISTIC PRE-PROCEDURE ASSESSMENT II: Performed by: NEUROLOGICAL SURGERY

## 2017-03-14 PROCEDURE — 25000128 H RX IP 250 OP 636: Performed by: STUDENT IN AN ORGANIZED HEALTH CARE EDUCATION/TRAINING PROGRAM

## 2017-03-14 PROCEDURE — 40000277 XR SURGERY CARM FLUORO LESS THAN 5 MIN W STILLS: Mod: TC

## 2017-03-14 PROCEDURE — 86850 RBC ANTIBODY SCREEN: CPT | Performed by: NEUROLOGICAL SURGERY

## 2017-03-14 PROCEDURE — 20000004 ZZH R&B ICU UMMC

## 2017-03-14 PROCEDURE — 00H03MZ INSERTION OF NEUROSTIMULATOR LEAD INTO BRAIN, PERCUTANEOUS APPROACH: ICD-10-PCS | Performed by: NEUROLOGICAL SURGERY

## 2017-03-14 PROCEDURE — 25800025 ZZH RX 258: Performed by: STUDENT IN AN ORGANIZED HEALTH CARE EDUCATION/TRAINING PROGRAM

## 2017-03-14 PROCEDURE — S0020 INJECTION, BUPIVICAINE HYDRO: HCPCS | Performed by: NEUROLOGICAL SURGERY

## 2017-03-14 PROCEDURE — 00000146 ZZHCL STATISTIC GLUCOSE BY METER IP

## 2017-03-14 PROCEDURE — 25000128 H RX IP 250 OP 636: Performed by: NEUROLOGICAL SURGERY

## 2017-03-14 PROCEDURE — 37000008 ZZH ANESTHESIA TECHNICAL FEE, 1ST 30 MIN: Performed by: NEUROLOGICAL SURGERY

## 2017-03-14 PROCEDURE — 27210995 ZZH RX 272: Performed by: NEUROLOGICAL SURGERY

## 2017-03-14 PROCEDURE — 8E09XBG COMPUTER ASSISTED PROCEDURE OF HEAD AND NECK REGION, WITH COMPUTERIZED TOMOGRAPHY: ICD-10-PCS | Performed by: NEUROLOGICAL SURGERY

## 2017-03-14 PROCEDURE — 86900 BLOOD TYPING SEROLOGIC ABO: CPT | Performed by: NEUROLOGICAL SURGERY

## 2017-03-14 RX ORDER — LABETALOL HYDROCHLORIDE 5 MG/ML
10 INJECTION, SOLUTION INTRAVENOUS
Status: DISCONTINUED | OUTPATIENT
Start: 2017-03-14 | End: 2017-03-14 | Stop reason: HOSPADM

## 2017-03-14 RX ORDER — HYDRALAZINE HYDROCHLORIDE 20 MG/ML
INJECTION INTRAMUSCULAR; INTRAVENOUS PRN
Status: DISCONTINUED | OUTPATIENT
Start: 2017-03-14 | End: 2017-03-14

## 2017-03-14 RX ORDER — FENTANYL CITRATE 50 UG/ML
25-50 INJECTION, SOLUTION INTRAMUSCULAR; INTRAVENOUS
Status: DISCONTINUED | OUTPATIENT
Start: 2017-03-14 | End: 2017-03-14 | Stop reason: HOSPADM

## 2017-03-14 RX ORDER — SODIUM CHLORIDE, SODIUM LACTATE, POTASSIUM CHLORIDE, CALCIUM CHLORIDE 600; 310; 30; 20 MG/100ML; MG/100ML; MG/100ML; MG/100ML
INJECTION, SOLUTION INTRAVENOUS CONTINUOUS PRN
Status: DISCONTINUED | OUTPATIENT
Start: 2017-03-14 | End: 2017-03-14

## 2017-03-14 RX ORDER — ACETAMINOPHEN 325 MG/1
975 TABLET ORAL EVERY 8 HOURS
Status: DISCONTINUED | OUTPATIENT
Start: 2017-03-14 | End: 2017-03-15 | Stop reason: HOSPADM

## 2017-03-14 RX ORDER — CLINDAMYCIN PHOSPHATE 900 MG/50ML
900 INJECTION, SOLUTION INTRAVENOUS
Status: COMPLETED | OUTPATIENT
Start: 2017-03-14 | End: 2017-03-14

## 2017-03-14 RX ORDER — GABAPENTIN 600 MG/1
600 TABLET ORAL 3 TIMES DAILY
Status: DISCONTINUED | OUTPATIENT
Start: 2017-03-14 | End: 2017-03-15 | Stop reason: HOSPADM

## 2017-03-14 RX ORDER — NALOXONE HYDROCHLORIDE 0.4 MG/ML
.1-.4 INJECTION, SOLUTION INTRAMUSCULAR; INTRAVENOUS; SUBCUTANEOUS
Status: DISCONTINUED | OUTPATIENT
Start: 2017-03-14 | End: 2017-03-14 | Stop reason: HOSPADM

## 2017-03-14 RX ORDER — NALOXONE HYDROCHLORIDE 0.4 MG/ML
.1-.4 INJECTION, SOLUTION INTRAMUSCULAR; INTRAVENOUS; SUBCUTANEOUS
Status: DISCONTINUED | OUTPATIENT
Start: 2017-03-14 | End: 2017-03-15 | Stop reason: HOSPADM

## 2017-03-14 RX ORDER — PRIMIDONE 250 MG/1
250 TABLET ORAL 2 TIMES DAILY
Status: DISCONTINUED | OUTPATIENT
Start: 2017-03-14 | End: 2017-03-15 | Stop reason: HOSPADM

## 2017-03-14 RX ORDER — SODIUM CHLORIDE, SODIUM LACTATE, POTASSIUM CHLORIDE, CALCIUM CHLORIDE 600; 310; 30; 20 MG/100ML; MG/100ML; MG/100ML; MG/100ML
INJECTION, SOLUTION INTRAVENOUS CONTINUOUS
Status: DISCONTINUED | OUTPATIENT
Start: 2017-03-14 | End: 2017-03-14 | Stop reason: HOSPADM

## 2017-03-14 RX ORDER — SODIUM CHLORIDE 9 MG/ML
INJECTION, SOLUTION INTRAVENOUS CONTINUOUS
Status: DISCONTINUED | OUTPATIENT
Start: 2017-03-14 | End: 2017-03-15 | Stop reason: HOSPADM

## 2017-03-14 RX ORDER — OXYCODONE HYDROCHLORIDE 5 MG/1
5-10 TABLET ORAL
Status: DISCONTINUED | OUTPATIENT
Start: 2017-03-14 | End: 2017-03-15

## 2017-03-14 RX ORDER — PROPOFOL 10 MG/ML
INJECTION, EMULSION INTRAVENOUS PRN
Status: DISCONTINUED | OUTPATIENT
Start: 2017-03-14 | End: 2017-03-14

## 2017-03-14 RX ORDER — ALBUTEROL SULFATE 0.83 MG/ML
2.5 SOLUTION RESPIRATORY (INHALATION) EVERY 4 HOURS PRN
Status: DISCONTINUED | OUTPATIENT
Start: 2017-03-14 | End: 2017-03-14 | Stop reason: HOSPADM

## 2017-03-14 RX ORDER — LABETALOL HYDROCHLORIDE 5 MG/ML
10-40 INJECTION, SOLUTION INTRAVENOUS EVERY 10 MIN PRN
Status: DISCONTINUED | OUTPATIENT
Start: 2017-03-14 | End: 2017-03-15 | Stop reason: HOSPADM

## 2017-03-14 RX ORDER — LIDOCAINE HYDROCHLORIDE AND EPINEPHRINE 10; 10 MG/ML; UG/ML
INJECTION, SOLUTION INFILTRATION; PERINEURAL PRN
Status: DISCONTINUED | OUTPATIENT
Start: 2017-03-14 | End: 2017-03-14 | Stop reason: HOSPADM

## 2017-03-14 RX ORDER — HYDRALAZINE HYDROCHLORIDE 20 MG/ML
10-20 INJECTION INTRAMUSCULAR; INTRAVENOUS EVERY 30 MIN PRN
Status: DISCONTINUED | OUTPATIENT
Start: 2017-03-14 | End: 2017-03-15 | Stop reason: HOSPADM

## 2017-03-14 RX ORDER — CLINDAMYCIN PHOSPHATE 900 MG/50ML
900 INJECTION, SOLUTION INTRAVENOUS EVERY 8 HOURS
Status: COMPLETED | OUTPATIENT
Start: 2017-03-14 | End: 2017-03-15

## 2017-03-14 RX ORDER — PROPOFOL 10 MG/ML
INJECTION, EMULSION INTRAVENOUS CONTINUOUS PRN
Status: DISCONTINUED | OUTPATIENT
Start: 2017-03-14 | End: 2017-03-14

## 2017-03-14 RX ORDER — PANTOPRAZOLE SODIUM 40 MG/1
40 TABLET, DELAYED RELEASE ORAL
Status: DISCONTINUED | OUTPATIENT
Start: 2017-03-15 | End: 2017-03-15 | Stop reason: HOSPADM

## 2017-03-14 RX ORDER — LIDOCAINE 40 MG/G
CREAM TOPICAL
Status: DISCONTINUED | OUTPATIENT
Start: 2017-03-14 | End: 2017-03-15 | Stop reason: HOSPADM

## 2017-03-14 RX ORDER — ONDANSETRON 4 MG/1
4 TABLET, ORALLY DISINTEGRATING ORAL EVERY 30 MIN PRN
Status: DISCONTINUED | OUTPATIENT
Start: 2017-03-14 | End: 2017-03-14 | Stop reason: HOSPADM

## 2017-03-14 RX ORDER — ACETAMINOPHEN 325 MG/1
650 TABLET ORAL EVERY 4 HOURS PRN
Status: DISCONTINUED | OUTPATIENT
Start: 2017-03-17 | End: 2017-03-15 | Stop reason: HOSPADM

## 2017-03-14 RX ORDER — ONDANSETRON 2 MG/ML
4 INJECTION INTRAMUSCULAR; INTRAVENOUS EVERY 6 HOURS PRN
Status: DISCONTINUED | OUTPATIENT
Start: 2017-03-14 | End: 2017-03-15 | Stop reason: HOSPADM

## 2017-03-14 RX ORDER — HYDROMORPHONE HCL/0.9% NACL/PF 0.2MG/0.2
0.2 SYRINGE (ML) INTRAVENOUS
Status: DISCONTINUED | OUTPATIENT
Start: 2017-03-14 | End: 2017-03-15

## 2017-03-14 RX ORDER — ONDANSETRON 4 MG/1
4 TABLET, ORALLY DISINTEGRATING ORAL EVERY 6 HOURS PRN
Status: DISCONTINUED | OUTPATIENT
Start: 2017-03-14 | End: 2017-03-15 | Stop reason: HOSPADM

## 2017-03-14 RX ORDER — CLINDAMYCIN PHOSPHATE 900 MG/50ML
900 INJECTION, SOLUTION INTRAVENOUS SEE ADMIN INSTRUCTIONS
Status: DISCONTINUED | OUTPATIENT
Start: 2017-03-14 | End: 2017-03-14 | Stop reason: HOSPADM

## 2017-03-14 RX ORDER — PRIMIDONE 250 MG/1
250 TABLET ORAL AT BEDTIME
Status: DISCONTINUED | OUTPATIENT
Start: 2017-03-14 | End: 2017-03-15 | Stop reason: HOSPADM

## 2017-03-14 RX ORDER — MAGNESIUM HYDROXIDE 1200 MG/15ML
LIQUID ORAL PRN
Status: DISCONTINUED | OUTPATIENT
Start: 2017-03-14 | End: 2017-03-14 | Stop reason: HOSPADM

## 2017-03-14 RX ORDER — HYDROMORPHONE HYDROCHLORIDE 1 MG/ML
.3-.5 INJECTION, SOLUTION INTRAMUSCULAR; INTRAVENOUS; SUBCUTANEOUS EVERY 5 MIN PRN
Status: DISCONTINUED | OUTPATIENT
Start: 2017-03-14 | End: 2017-03-14 | Stop reason: HOSPADM

## 2017-03-14 RX ORDER — ONDANSETRON 2 MG/ML
4 INJECTION INTRAMUSCULAR; INTRAVENOUS EVERY 30 MIN PRN
Status: DISCONTINUED | OUTPATIENT
Start: 2017-03-14 | End: 2017-03-14 | Stop reason: HOSPADM

## 2017-03-14 RX ORDER — ALBUTEROL SULFATE 90 UG/1
2 AEROSOL, METERED RESPIRATORY (INHALATION) EVERY 6 HOURS
Status: DISCONTINUED | OUTPATIENT
Start: 2017-03-14 | End: 2017-03-15 | Stop reason: HOSPADM

## 2017-03-14 RX ADMIN — PROPOFOL 40 MG: 10 INJECTION, EMULSION INTRAVENOUS at 09:10

## 2017-03-14 RX ADMIN — PROPOFOL 40 MG: 10 INJECTION, EMULSION INTRAVENOUS at 08:23

## 2017-03-14 RX ADMIN — PROPOFOL 10 MG: 10 INJECTION, EMULSION INTRAVENOUS at 09:58

## 2017-03-14 RX ADMIN — PROPOFOL 10 MG: 10 INJECTION, EMULSION INTRAVENOUS at 14:04

## 2017-03-14 RX ADMIN — HYDROMORPHONE HYDROCHLORIDE 0.3 MG: 10 INJECTION, SOLUTION INTRAMUSCULAR; INTRAVENOUS; SUBCUTANEOUS at 15:06

## 2017-03-14 RX ADMIN — DEXMEDETOMIDINE 0.2 MCG/KG/HR: 100 INJECTION, SOLUTION, CONCENTRATE INTRAVENOUS at 08:21

## 2017-03-14 RX ADMIN — PROPOFOL 10 MG: 10 INJECTION, EMULSION INTRAVENOUS at 09:55

## 2017-03-14 RX ADMIN — PROPOFOL 20 MG: 10 INJECTION, EMULSION INTRAVENOUS at 10:09

## 2017-03-14 RX ADMIN — ACETAMINOPHEN 975 MG: 325 TABLET, FILM COATED ORAL at 23:41

## 2017-03-14 RX ADMIN — HYDRALAZINE HYDROCHLORIDE 2 MG: 20 INJECTION INTRAMUSCULAR; INTRAVENOUS at 13:25

## 2017-03-14 RX ADMIN — HYDROMORPHONE HYDROCHLORIDE 0.2 MG: 10 INJECTION, SOLUTION INTRAMUSCULAR; INTRAVENOUS; SUBCUTANEOUS at 22:25

## 2017-03-14 RX ADMIN — PROPOFOL 20 MG: 10 INJECTION, EMULSION INTRAVENOUS at 13:52

## 2017-03-14 RX ADMIN — PROPOFOL 20 MG: 10 INJECTION, EMULSION INTRAVENOUS at 09:14

## 2017-03-14 RX ADMIN — PROPOFOL 10 MG: 10 INJECTION, EMULSION INTRAVENOUS at 13:57

## 2017-03-14 RX ADMIN — HYDRALAZINE HYDROCHLORIDE 2 MG: 20 INJECTION INTRAMUSCULAR; INTRAVENOUS at 13:41

## 2017-03-14 RX ADMIN — SODIUM CHLORIDE, POTASSIUM CHLORIDE, SODIUM LACTATE AND CALCIUM CHLORIDE: 600; 310; 30; 20 INJECTION, SOLUTION INTRAVENOUS at 08:10

## 2017-03-14 RX ADMIN — SODIUM CHLORIDE, POTASSIUM CHLORIDE, SODIUM LACTATE AND CALCIUM CHLORIDE: 600; 310; 30; 20 INJECTION, SOLUTION INTRAVENOUS at 14:02

## 2017-03-14 RX ADMIN — PROPOFOL 10 MG: 10 INJECTION, EMULSION INTRAVENOUS at 09:41

## 2017-03-14 RX ADMIN — PROPOFOL 20 MG: 10 INJECTION, EMULSION INTRAVENOUS at 13:50

## 2017-03-14 RX ADMIN — PROPOFOL 30 MG: 10 INJECTION, EMULSION INTRAVENOUS at 08:33

## 2017-03-14 RX ADMIN — PROPOFOL 30 MG: 10 INJECTION, EMULSION INTRAVENOUS at 13:49

## 2017-03-14 RX ADMIN — ONDANSETRON 4 MG: 2 INJECTION INTRAMUSCULAR; INTRAVENOUS at 17:36

## 2017-03-14 RX ADMIN — GABAPENTIN 600 MG: 600 TABLET, FILM COATED ORAL at 19:31

## 2017-03-14 RX ADMIN — PROPOFOL 10 MG: 10 INJECTION, EMULSION INTRAVENOUS at 09:54

## 2017-03-14 RX ADMIN — ACETAMINOPHEN 975 MG: 325 TABLET, FILM COATED ORAL at 17:36

## 2017-03-14 RX ADMIN — PROPOFOL 10 MG: 10 INJECTION, EMULSION INTRAVENOUS at 09:57

## 2017-03-14 RX ADMIN — PROPOFOL 50 MCG/KG/MIN: 10 INJECTION, EMULSION INTRAVENOUS at 08:22

## 2017-03-14 RX ADMIN — HYDROMORPHONE HYDROCHLORIDE 0.2 MG: 10 INJECTION, SOLUTION INTRAMUSCULAR; INTRAVENOUS; SUBCUTANEOUS at 15:49

## 2017-03-14 RX ADMIN — PROPOFOL 20 MG: 10 INJECTION, EMULSION INTRAVENOUS at 09:38

## 2017-03-14 RX ADMIN — PROPOFOL 20 MG: 10 INJECTION, EMULSION INTRAVENOUS at 08:27

## 2017-03-14 RX ADMIN — PRIMIDONE 250 MG: 250 TABLET ORAL at 19:24

## 2017-03-14 RX ADMIN — CLINDAMYCIN PHOSPHATE 900 MG: 18 INJECTION, SOLUTION INTRAVENOUS at 23:41

## 2017-03-14 RX ADMIN — SODIUM CHLORIDE 75 ML/HR: 9 INJECTION, SOLUTION INTRAVENOUS at 15:38

## 2017-03-14 RX ADMIN — PRIMIDONE 250 MG: 250 TABLET ORAL at 22:21

## 2017-03-14 RX ADMIN — HYDRALAZINE HYDROCHLORIDE 2 MG: 20 INJECTION INTRAMUSCULAR; INTRAVENOUS at 13:40

## 2017-03-14 RX ADMIN — CLINDAMYCIN PHOSPHATE 900 MG: 18 INJECTION, SOLUTION INTRAVENOUS at 17:32

## 2017-03-14 RX ADMIN — CLINDAMYCIN PHOSPHATE 900 MG: 18 INJECTION, SOLUTION INTRAVENOUS at 08:20

## 2017-03-14 ASSESSMENT — PAIN DESCRIPTION - DESCRIPTORS: DESCRIPTORS: CONSTANT

## 2017-03-14 ASSESSMENT — VISUAL ACUITY
OU: NORMAL ACUITY

## 2017-03-14 NOTE — ANESTHESIA CARE TRANSFER NOTE
Patient: Hilary Cornelius    Procedure(s):  Stealth Assisted Left Side Deep Brain Stimulator Placement, Phase I, Placement Of Deep Brain Stimulator Electrode, Target Left Ventral Intermediate Nucleus Of The Thalamus, With Microelectrode Recording - Wound Class: I-Clean    Diagnosis: Essential Tremor   Diagnosis Additional Information: No value filed.    Anesthesia Type:   MAC     Note:  Airway :Nasal Cannula  Patient transferred to:PACU  Comments: Airway : Nasal Cannula  Patient transferred to:PACU  Comments: Patient was awake and breathing spontaneously with appropriate respiratory rate and tidal volume. The patient was following commands, warm and demonstrated adequate strength.   Transported to PACU on 2L O2 via nasal cannula.   VSS upon arrival to PACU.  Patient denies nausea or pain at this time.   Care transfer plan communicated and patient care transferred to PACU RN     Nick Russ Jr., MD  Anesthesia Resident - [facility]    3/14/2017  3:13 PM        Vitals: (Last set prior to Anesthesia Care Transfer)    CRNA VITALS  3/14/2017 1415 - 3/14/2017 1513      3/14/2017             Pulse: 66    SpO2: 100 %                Electronically Signed By: Nick Russ MD  March 14, 2017  3:13 PM

## 2017-03-14 NOTE — IP AVS SNAPSHOT
MRN:[id number]                      After Visit Summary   3/14/2017    Hilary Cornelius    MRN: [id number]           Thank you!     Thank you for choosing [facility] for your care. Our goal is always to provide you with excellent care. Hearing back from our patients is one way we can continue to improve our services. Please take a few minutes to complete the written survey that you may receive in the mail after you visit with us. Thank you!        Patient Information     Date Of Birth          1955        About your hospital stay     You were admitted on:  March 14, 2017 You last received care in the:  [address] [facility] [place]    You were discharged on:  March 15, 2017        Reason for your hospital stay       DBS surgery                  Who to Call     For medical emergencies, please call 911.  For non-urgent questions about your medical care, please call your primary care provider or clinic, [phone]  For questions related to your surgery, please call your surgery clinic        Attending Provider     Provider Henok Alarcon MD Neurosurgery       Primary Care Provider Office Phone # Fax #    Zachary Holguin [phone]       [address] [address]        After Care Instructions     Activity       Your activity upon discharge: activity as tolerated            Diet       Follow this diet upon discharge: resume pre-hospital diet                  Follow-up Appointments     [facility]ult [facility] Follow-up and recommended labs and tests       You underwent surgery place a  deep brain stimulator  by Henok Javier MD, PhD      - Your sutures are absorbable.   t- You do not need to follow up in clinic before your pulse generator (battery) is placed.  If you do not know your surgery date for the pulse generator placement, please call our clinic at [phone] to receive that date and time.     - You will have follow up scheduled with  the physician assistants and/or nurse practitioners in our clinic 2 weeks after your surgery.  If you live far away, you may see your primary care doctor for a wound check at 2 weeks.     - If you have not heard from our clinic about your follow up visit by 3-4 days following your discharge, please call our clinic at [phone] to schedule an appointment with the Neurosurgery teams.     After discharge, your activity restrictions are:   -We encourage short frequent walks, increasing as tolerated.  - No driving until you are seen in clinic and cleared by your neurosurgeon.  If you have had a seizure, you may not drive for at least 3 months according to [place] law.    - No strenuous activity.  - No lifting more than 10 pounds until you are seen in clinic (a gallon of milk weighs approximately 8 pounds)    Wound care  - You are ok to shower, but do not soak your incisions. Pat them dry if they get damp.   - Avoid coloring your hair or permanent styling until cleared by your surgeon  - No baths, hot tubs or pools for 4-6 weeks after surgery.       Call if you have any of the followin. Temperature greater than 101.5 F.   2. Any redness, swelling or discharge from the wound.   3. Any new weakness, numbness or altered mental status.  4. Worsening pain that is not improving with the pain medications you were prescribed.     Call [phone] or after 5:00 pm or on weekends call [phone] and ask for the neurosurgery resident on call. Thank You.                  Your next 10 appointments already scheduled     Mar 24, 2017   Procedure with Henok Javier MD   [facility], [place], Same Day Surgery (--)    [address] [facility] [place] [address]   [phone]              Pending Results     Date and Time Order Name Status Description    3/15/2017 0229 CBC (AM Draw) In process             Statement of Approval     Ordered          03/15/17 0917  I have reviewed and agree with all the recommendations and orders  "detailed in this document.  EFFECTIVE NOW     Approved and electronically signed by:  Leschke, John M, MD             Admission Information     Date & Time Provider Department Dept. Phone    3/14/2017 Henok Javier MD [address] [facility] [place] [phone]      Your Vitals Were     Blood Pressure Temperature Respirations Height Weight Pulse Oximetry    101/60 98  F (36.7  C) (Oral) 14 1.689 m (5' 6.5\") 85.1 kg (187 lb 9.8 oz) 96%    BMI (Body Mass Index)                   29.83 kg/m2           MyChart Information     [employer] gives you secure access to your electronic health record. If you see a primary care provider, you can also send messages to your care team and make appointments. If you have questions, please call your primary care clinic.  If you do not have a primary care provider, please call [phone] and they will assist you.        Care EveryWhere ID     This is your Care EveryWhere ID. This could be used by other organizations to access your [facility] medical records  [id number]           Review of your medicines      START taking        Dose / Directions    acetaminophen 325 MG tablet   Commonly known as:  TYLENOL   Used for:  Essential tremor        Dose:  650 mg   Start taking on:  3/17/2017   Take 2 tablets (650 mg) by mouth every 4 hours as needed for other (surgical pain)   Quantity:  100 tablet   Refills:  0       oxyCODONE 5 MG IR tablet   Commonly known as:  ROXICODONE   Used for:  Essential tremor        Dose:  2.5-5 mg   Take 0.5-1 tablets (2.5-5 mg) by mouth every 3 hours as needed for moderate to severe pain   Quantity:  60 tablet   Refills:  0         CONTINUE these medicines which have NOT CHANGED        Dose / Directions    albuterol 108 (90 BASE) MCG/ACT Inhaler   Commonly known as:  PROAIR HFA/PROVENTIL HFA/VENTOLIN HFA        Dose:  2 puff   Inhale 2 puffs into the lungs every 6 hours   Refills:  0       calcium carbonate 600 MG tablet   Commonly known as:  OS-GISELA 600 mg " [place]. [place]        Dose:  600 mg   Take 600 mg by mouth daily   Refills:  0       cyabnocobalamin 2500 MCG sublingual tablet   Commonly known as:  VITAMIN B-12        Dose:  2500 mcg   Place 2,500 mcg under the tongue daily   Refills:  0       diphenhydrAMINE-acetaminophen  MG tablet   Commonly known as:  TYLENOL PM        Dose:  1 tablet   Take 1 tablet by mouth as needed   Refills:  0       folic acid 400 MCG tablet   Commonly known as:  FOLVITE        Dose:  400 mcg   Take 400 mcg by mouth daily   Refills:  0       gabapentin 600 MG tablet   Commonly known as:  NEURONTIN        Dose:  600 mg   Take 600 mg by mouth 3 times daily   Refills:  0       Multiple vitamin Tabs        Dose:  1 tablet   Take 1 tablet by mouth daily   Refills:  0       primidone 250 MG tablet   Commonly known as:  MYSOLINE        1 in am and 2 in the evening   Refills:  0            Where to get your medicines      Some of these will need a paper prescription and others can be bought over the counter. Ask your nurse if you have questions.     Bring a paper prescription for each of these medications     oxyCODONE 5 MG IR tablet       You don't need a prescription for these medications     acetaminophen 325 MG tablet                Protect others around you: Learn how to safely use, store and throw away your medicines at www.disposemymeds.org.             Medication List: This is a list of all your medications and when to take them. Check marks below indicate your daily home schedule. Keep this list as a reference.      Medications           Morning Afternoon Evening Bedtime As Needed    acetaminophen 325 MG tablet   Commonly known as:  TYLENOL   Take 2 tablets (650 mg) by mouth every 4 hours as needed for other (surgical pain)   Start taking on:  3/17/2017   Last time this was given:  975 mg on 3/15/2017  8:23 AM                                albuterol 108 (90 BASE) MCG/ACT Inhaler   Commonly known as:  PROAIR HFA/PROVENTIL HFA/VENTOLIN  HFA   Inhale 2 puffs into the lungs every 6 hours                                calcium carbonate 600 MG tablet   Commonly known as:  OS-GISELA 600 mg [place]. Ca   Take 600 mg by mouth daily                                cyabnocobalamin 2500 MCG sublingual tablet   Commonly known as:  VITAMIN B-12   Place 2,500 mcg under the tongue daily                                diphenhydrAMINE-acetaminophen  MG tablet   Commonly known as:  TYLENOL PM   Take 1 tablet by mouth as needed                                folic acid 400 MCG tablet   Commonly known as:  FOLVITE   Take 400 mcg by mouth daily                                gabapentin 600 MG tablet   Commonly known as:  NEURONTIN   Take 600 mg by mouth 3 times daily   Last time this was given:  600 mg on 3/15/2017  8:23 AM                                Multiple vitamin Tabs   Take 1 tablet by mouth daily                                oxyCODONE 5 MG IR tablet   Commonly known as:  ROXICODONE   Take 0.5-1 tablets (2.5-5 mg) by mouth every 3 hours as needed for moderate to severe pain                                primidone 250 MG tablet   Commonly known as:  MYSOLINE   1 in am and 2 in the evening   Last time this was given:  250 mg on 3/15/2017  8:23 AM

## 2017-03-14 NOTE — PROGRESS NOTES
SPIRITUAL HEALTH SERVICES  [facility] 3C   PRE-SURGERY VISIT    Had pre-surgery visit with pt.  Provided spiritual support, prayer.     Joceline Martínez MDiv  Chaplain Resident  Pager Number [phone]

## 2017-03-14 NOTE — ANESTHESIA POSTPROCEDURE EVALUATION
Patient: Hilary Cornelius    Procedure(s):  Stealth Assisted Left Side Deep Brain Stimulator Placement, Phase I, Placement Of Deep Brain Stimulator Electrode, Target Left Ventral Intermediate Nucleus Of The Thalamus, With Microelectrode Recording - Wound Class: I-Clean    Diagnosis:Essential Tremor   Diagnosis Additional Information: No value filed.    Anesthesia Type:  MAC    Note:  Anesthesia Post Evaluation    Patient location during evaluation: PACU  Patient participation: Able to fully participate in evaluation  Level of consciousness: awake and alert  Pain management: adequate  Airway patency: patent  Cardiovascular status: acceptable  Respiratory status: acceptable  Hydration status: acceptable  PONV: none     Anesthetic complications: None          Last vitals:  Vitals:    03/14/17 1450 03/14/17 1500 03/14/17 1515   BP: 116/76 122/76 108/63   Resp: 12 16 16   Temp: 36.7  C (98  F)     SpO2: 99% 98% 98%         Electronically Signed By: Henok Uribe MD  March 14, 2017  3:33 PM

## 2017-03-14 NOTE — OP NOTE
PATIENT NAME: HILARY MARTIN  YOB: 1955  MRN:   [id number]  ACCOUNT:  [id number]      DATE OF PROCEDURE: 03/14/2017    PREOPERATIVE DIAGNOSIS: Essential Tremor     POSTOPERATIVE DIAGNOSIS: Essential Tremor    PROCEDURES PERFORMED:  1. Placement of CRW stereotactic headframe.   2. Stereotactic neuronavigation planning and CT of head.   3. Stereotactic neuronavigation using the [place] system for surgical planning, targeting and approach. The target is left ventral intermediate nucleus of the thalamus (Vim).   4. Left side deep brain stimulator placement, phase I, placement of left side deep brain stimulator electrode, target is left ventral intermediate nucleus of the thalamus (Vim).   5. Use of intraoperative microelectrode recording.   6. Use of intraoperative fluoroscopy.      ATTENDING SURGEON: Henok Javier MD, PhD      RESIDENT SURGEON: John M. Leschke, MD      ANESTHESIA: Monitored anesthesia care and local anesthetic.      ESTIMATED BLOOD LOSS: 20 mL      COMPLICATIONS: None.      FINDINGS: Final electrode location anterior Ronan Gun position after a frame shift, 2 mm below the target.      IMPLANTS: Abbott Infinity 0.5 segmented DBS lead.   INDICATIONS FOR PROCEDURE: Ms. Hilary Martin is a 62 year old right handed female with history of essential tremor. She has had tremor as long as she can remember even as a child. She began getting treatment for her tremor 20 years ago with various medications including Propranolol, Topamax, and Sinemet. Currently she takes Gabapentin 900 mg/day and Primidone 750 mg/day. She was evaluated at [facility] back in 1990s but she was not offered any treatment. Her tremors have gotten worse in the past few years and it is socially embarrassing for her. Alcohol may help her tremor.  She has a head tremor and a vocal as well as a hand tremor. Her hand tremor affects her ability to write and she has a hard time writing and has a stamp. She has to use both  "hands in holding a glass and has to cover cups and uses a straw. She has to have her glass on the table and uses a straw. She has problems keeping food on utensils. She has had some problems with balance. Her memory is fine.  She denies mood disorders. She has a history of intermittent headaches. Her goals for DBS is to be able to \"hold a glass of water without spilling, cut things without cutting my fingers into pieces, write my name like an adult and not like a [age]-year-old, and eat food without spilling all over.\" She was seen by Laura Bonilla, Neurology APRN, on 11/09/2016 and was given a tremor score of 68. She has a history of gastric bypass and is on calcium, Vit B12 and folate. She lives in [place], which is about 5 hours away.  Her neurologist in [place] is Dr. Ybarra who referred her for DBS. Her case was discussed at the Movement Disorder Consensus Group Meeting and she was considered to be a good candidate for DBS, treating the right side of her body with the left sided implantation. She would also be a \"wait and see\" candidate for the other side. We discussed that during Phase I, we would place the DBS electrode on the left side under MAC and microelectrode recording. She would then return one week later for phase II for the placement of the DBS generator/battery over the left chest wall under general anesthesia. Risks, benefits, alternative therapies were discussed with the patient, including but not limited to infection and bleeding. Surgical procedure was discussed in detail. All questions were answered, and she expressed understanding.    DESCRIPTION OF PROCEDURE:     CRW head frame placement and stereotactic neuronavigation.     The patient was brought to the operating room and placed in a supine position in her bed. Brief timeout was performed for placement of the CRW head frame. She was given sedation to make her comfortable. Intended pin sites, two in the front and two in the back of the head, " were cleaned and were injected with local anesthetic, 1% Lidocaine with epinephrine. Total of 24 mL were used. Then, CRW stereotactic head frame was placed onto her head with the four pins for fixation. Care was taken so that the fiducial box would fit over the head and the frame. Once the head frame was on, the fiducial box was easily placed without interference from her forehead. The patient tolerated the procedure well and her sedation was lightened and she was awakened.     After the CRW stereotactic head frame was placed, she was taken directly to the CT scanner, at which time CT of the head stereotactic protocol was obtained. Subsequently, the patient was taken back up to the operating room where she was placed supine on the operative bed with the CRW head frame affixed to the bed as well. Patient was in a slight sitting up position with the neck in a neutral position and she was made comfortable. The bed was positioned so that it would be a beach chair reclining position (head up, legs down, reverse trendelenburg). All pressure points were well padded. Care was taken to make sure that the neck was in neutral position and that the Premier head long device had room for manipulation in case more flexion or extension is needed throughout the case.    At this time, attention was turned to the neuro navigation imaging that was obtained. The Stealth neuronavigation device was loaded with the CT head that had just been obtained. The device also had an MRI of the head, stereotactic protocol, that was obtained prior to surgery. CT of the head was merged with the previously obtained MRI of the brain. The merge demonstrated good coherence/registration. Then, using this merged image, neuronavigation was used to stereotactically target the left ventral intermediate nucleus of the thalamus (Vim). The technique used was the AC-PC line localization technique to target the Vim using stereotactic coordinates and the X, Y and  Z as well as the ring and arc angles for the CRW head frame were obtained for the left side. The entry into the skull, as well as the trajectory of the electrode were checked with a probe's eye view to avoid any sulci, ventricle or vascular structures.     The stereotactic coordinates for the left side was then transferred to the CR stereotactic targeting apparatus as well as the phantom base. The coordinates were confirmed and double checked for accuracy. Accuracy was also confirmed using phantom base. The coordinates were X = -12.0, Y = -11.7, Z = -2.9, ring angle = 66.0 degrees anterior, and arc angle = 16 degrees to the left.     At this time, attention was turned to the patient. Using a hair clipper, her hair over the left frontal area was clipped using the surgical clipper. A semicircular incision was planned on the left side and this was marked. Then, the surgical area was prepped and draped in routine surgical fashion. We also prepped the area posterior to this as well as area towards the left parietal area. The patient was also made comfortable.     Timeout was then performed confirming the patient's identity, procedure to be performed, side and site of surgery identified, imaging corresponding with the patient and administration of preoperative prophylactic antibiotic.    Left-sided electrode placement with microelectrode recording: Target left Vim.     The CRW targeting apparatus was attached to the head frame on top of the sterile drapes. The entry point was marked on the scalp on the left side. A C-shaped incision was made with a skin knife, after the area was injected with local anesthetic, 1% Lidocaine with epinephrine and 1/4% Marcaine plain, 50:50 mix. The area posterior to the incision was also injected as a pocket would be created. Area posterior lateral, towards the left parietal area was also injected as the electrode connector will be tunneled here later. Total of 18 mL of the above mentioned  local anesthetic was used. The incision was then further carried down to the pericranium. Hemostasis was obtained using monopolar and bipolar cautery. Thin layer of pericranial tissue was left behind on the scalp and the skin flap was reflected posteriorly. Then, using a blunt dissection technique, a pocket was created posteriorly as well as a tract that was made towards the left parietal area for later use.    At this time, the targeting apparatus again positioned and the entry point to the skull was marked. Area of the intended bur hole was cleaned and pericranial tissue removed. Then using an acorn drill, bur hole was created over this entry point to expose the dura. The area was vigorously irrigated and bone wax used to control the bone bleeding. Dura was coagulated with bipolar cautery for hemostasis, and again no active bleeding was noted.     At this time, the electrode fixation cover was fixed to the skull using two screws. Care was taken to overlap the pericranium over the edge of the cover to provide a smooth tissue transition. Then, the electrode drive was attached to the targeting apparatus. The entry into the dura was again checked. It appeared that all positions of the Ronan Gun electrode long were accessible without any interference from the bone edge. Then using a Bovie cautery and a #4 Penfield instrument, opening was made into the dura, as well as a small corticectomy. No active bleeding was noted.    Dr. Marlo Hooker and Dr. Parish Estrada of the Neurology Department participated in the recording and neurological testing. During the microelectrode recording and testing, Dr. Hooker and Dr. Estrada took detailed notes of the electrophysiologic data and neurologic exam as well as any stimulation effects.    At this time, the electrode guides were inserted in the center and posterior Ronan Gun positions and they were advanced slowly until they were fully inserted at which point the tips would be well above  the target. No abnormal resistance was noted. Duraseal was used to seal the entry site to provide a seal and to minimize cerebrospinal fluid leak and air entry. Then, recording microelectrodes were brought in and placed within the guide tubes and they were connected for intraoperative recording. The tips of the electrode were now 15 mm above target. The patient was awakened. Then, using a micro drive, the electrodes were slowly advanced towards the target, collecting microelectrode recording data for mapping the tract. Throughout the tract, good quality expected recording was observed. Robust electrical activity was noted and both tracts entered the Vc. The center tract had Vc that was about 2.17 mm in length with activity related to tongue protrusion and tactile sensation from the lip. The posterior tract had Vc that was about 1.91 mm in length with activities related to tactile sensation from the upper lip and lower lip on the right side. Microelectrode stimulation at both tracts yielded expected effects at the mouth, face and lips.    Based on the electrophysiology data collected, it was thought that the current tracts were too medial and posterior to the desirable target area.  Therefore, a frame shift was made 2 mm anterior and 1 mm lateral. The electrodes were pulled out of the guide tubes and the guide tubes were removed.  The frame shift was made on the CR targeting apparatus.  The X coordinate was changed to +13.0 and Y coordinate was changed to -9.7. To improve the trajectory and entry into the brain, the ring angle was changed to 66 degrees anterior and arc angle changed to 15 degrees to the left.    The microelectrode recording continued. The electrode guide tube was inserted in the center Ronan Lou position and was inserted into the brain and advanced slowly until at which point the tip would be well above the target. No abnormal resistance was noted. DuraSeal was used to seal the entry point to the  dura and minimize cerebrospinal fluid leak and air entry. Then, recording microelectrode, exchanged to a new one, was brought in and placed within the guide tube and connected for intraoperative recording. The tip of the electrode was now 15 mm above the target. The patient remained awake. Then, using a micro drive, the electrodes were slowly advanced towards the target, collecting microelectrode recording data for mapping the tract. Throughout the tract, good quality expected recording was observed. Robust electrical activity was noted and the electrode entered the Vc. The center tract had Vc that was about 0.9 mm in length with activity related to tactile sensation from the index finger. Microelectrode stimulation yielded effects at the face and tongue.    Based on the electrophysiology data collected, it was thought that the current tracts were still too posterior to the desirable target area.  Therefore, a frame shift was made 1 mm anterior. The electrode was pulled out of the guide tube and the guide tube was removed.  The frame shift was made on the CR targeting apparatus.  The Y coordinate was changed to -8.7.  To improve the trajectory and entry into the brain, the ring angle was changed to 68 degrees anterior and arc angle changed to 14 degrees to the left.    The microelectrode recording continued. The electrode guide tube was inserted in the anterior Ronan Lou position and was inserted into the brain and advanced slowly until at which point the tip would be well above the target. No abnormal resistance was noted. DuraSeal was used to seal the entry point to the dura and minimize cerebrospinal fluid leak and air entry. Then, recording microelectrode was brought in and placed within the guide tube and connected for intraoperative recording. The tip of the electrode was now 15 mm above the target. The patient remained awake. Then, using a micro drive, the electrodes were slowly advanced towards the target,  collecting microelectrode recording data for mapping the tract. There were minimal electrical activity noted and there were virtually no unit activity recorded.    Based on the mapping data, it was decided that the current anterior Ronan Gun position may be the best placement for the permanent electrode. The electrode drive was then positioned to the desired position with the micro drive and the electrode pulled out of the guide tube. The anterior guide tube was removed and the DBS electrode insertion tube was inserted. The permanent DBS electrode was brought into the field and placed in the anterior guide tube with the tip being placed at the target depth. The electrode demonstrated good impedance values. The electrode was tested and stimulation caused improvement in action tremor and postural tremor starting at about 2 microamp stimulation with transient paresthesia noted on the face. Since the ventral border was not determined using electrophysiology, we used stimulation to determine the best location. The electrode was lowered to 1 mm below the target. The electrode was tested again and stimulation caused improvement in action tremor and postural tremor starting at about 1 microamp stimulation with transient paresthesia noted on the neck. The electrode was further lowered to 2 mm below the target. The electrode was tested again and stimulation caused improvement in action tremor starting at about 1 microamp stimulation no transient paresthesias. Internal capsular effects were noted at 5.0 milliamps.  Based on the results, it was thought that the current location may be the best location of the permanent electrode.    With the satisfactory testing of the electrode position and the stimulation parameters, the electrode was secured at this location. The patient was again made comfortable. The guide tube was removed. Duraseal was again used to seal any opening. The area was generously irrigated. Hemostasis was also  obtained. Fluoroscopy was brought into the field to test that the electrode did not move with each manipulation. The electrode tip was seen to be below the target, as expected. The electrode clamp was applied to hold the electrode. Then, the electrode stylet was removed. The electrode was then removed from the electrode long. The cap cover was finally placed and secured in place. Fluoroscopy confirmed that the tip of the electrode did not change in position with each manipulation. The directional marker, on fluoroscopy, also demonstrated that the contact 2A is facing anteriorly.    The connecting portion of the electrode was covered with a protective covering/dead-end connector, and this apparatus was inserted into the subgaleal space/pocket towards the left parietal area that was created at the beginning of the case. The excess wire was also buried posteriorly in the previously created pocket. Fluoroscopy confirmed that the tip did not move. The wound was then generously irrigated.    The galeal layer was reapproximated using 3-0 Vicryl sutures and the skin was reapproximated using 4-0 Monocryl in a running fashion. The wound was cleaned and dried and Dermabond was applied.    Removal of the head frame and end of procedure    First, the stereotactic targeting apparatus was removed. Then, the sterile drapes were removed. Subsequently, the patient was taken out of the CRW head frame. The four pin sites were clean and dry and no active bleeding was noted. Antibiotic ointment was applied to the pin sites.    Patient was further awakened and taken to the recovery room in a stable condition. At the end of the case, all counts including needle, sponge and instrument counts were correct x 2. There were no complications.    Dr. Javier was present and scrubbed during the entire case and performed the key and critical portions of the case.       MURRAY JAVIER MD     As written by JOHN M. LESCHKE, MD      NEUROSURGERY  ATTENDING ATTESTATION: Henok JACOBSEN M.D., Ph.D., Neurosurgery Attending, was present and scrubbed for the entire case and performed the key and critical portions of the case.

## 2017-03-14 NOTE — BRIEF OP NOTE
[facility], [place]    Brief Operative Note    Pre-operative diagnosis: Essential Tremor   Post-operative diagnosis same  Procedure: Procedure(s):  Stealth Assisted Left Side Deep Brain Stimulator Placement, Phase I, Placement Of Deep Brain Stimulator Electrode, Target Left Ventral Intermediate Nucleus Of The Thalamus, With Microelectrode Recording - Wound Class: I-Clean  Surgeon: Surgeon(s) and Role:     * Henok Javier MD - Primary     * Leschke, John M, MD - Resident - Assisting  Anesthesia: Monitor Anesthesia Care   Estimated blood loss: Less than 10 ml  Drains: None  Specimens: * No specimens in log *  Findings:   Successful DBS targeting of left Vim   Complications: None.  Implants: [employer] DBS depth electrode

## 2017-03-14 NOTE — IP AVS SNAPSHOT
[address] [address]    Phone:  [phone]                                       After Visit Summary   3/14/2017    Hilary Cornelius    MRN: [id number]           After Visit Summary Signature Page     I have received my discharge instructions, and my questions have been answered. I have discussed any challenges I see with this plan with the nurse or doctor.    ..........................................................................................................................................  Patient/Patient Representative Signature      ..........................................................................................................................................  Patient Representative Print Name and Relationship to Patient    ..................................................               ................................................  Date                                            Time    ..........................................................................................................................................  Reviewed by Signature/Title    ...................................................              ..............................................  Date                                                            Time

## 2017-03-15 ENCOUNTER — APPOINTMENT (OUTPATIENT)
Dept: CT IMAGING | Facility: CLINIC | Age: 62
DRG: 027 | End: 2017-03-15
Attending: STUDENT IN AN ORGANIZED HEALTH CARE EDUCATION/TRAINING PROGRAM
Payer: COMMERCIAL

## 2017-03-15 ENCOUNTER — TELEPHONE (OUTPATIENT)
Dept: NEUROLOGY | Facility: CLINIC | Age: 62
End: 2017-03-15

## 2017-03-15 VITALS
TEMPERATURE: 98 F | BODY MASS INDEX: 29.45 KG/M2 | WEIGHT: 187.61 LBS | SYSTOLIC BLOOD PRESSURE: 122 MMHG | RESPIRATION RATE: 14 BRPM | OXYGEN SATURATION: 93 % | DIASTOLIC BLOOD PRESSURE: 82 MMHG | HEIGHT: 67 IN

## 2017-03-15 LAB
ANION GAP SERPL CALCULATED.3IONS-SCNC: 7 MMOL/L (ref 3–14)
BUN SERPL-MCNC: 9 MG/DL (ref 7–30)
CALCIUM SERPL-MCNC: 8.2 MG/DL (ref 8.5–10.1)
CHLORIDE SERPL-SCNC: 104 MMOL/L (ref 94–109)
CO2 SERPL-SCNC: 27 MMOL/L (ref 20–32)
CREAT SERPL-MCNC: 0.36 MG/DL (ref 0.52–1.04)
ERYTHROCYTE [DISTWIDTH] IN BLOOD BY AUTOMATED COUNT: 13.4 % (ref 10–15)
GFR SERPL CREATININE-BSD FRML MDRD: ABNORMAL ML/MIN/1.7M2
GLUCOSE SERPL-MCNC: 138 MG/DL (ref 70–99)
HCT VFR BLD AUTO: 35.5 % (ref 35–47)
HGB BLD-MCNC: 12.1 G/DL (ref 11.7–15.7)
MCH RBC QN AUTO: 29.9 PG (ref 26.5–33)
MCHC RBC AUTO-ENTMCNC: 34.1 G/DL (ref 31.5–36.5)
MCV RBC AUTO: 88 FL (ref 78–100)
PLATELET # BLD AUTO: 142 10E9/L (ref 150–450)
POTASSIUM SERPL-SCNC: 3.7 MMOL/L (ref 3.4–5.3)
RBC # BLD AUTO: 4.05 10E12/L (ref 3.8–5.2)
SODIUM SERPL-SCNC: 138 MMOL/L (ref 133–144)
WBC # BLD AUTO: 5.8 10E9/L (ref 4–11)

## 2017-03-15 PROCEDURE — 85027 COMPLETE CBC AUTOMATED: CPT | Performed by: NEUROLOGICAL SURGERY

## 2017-03-15 PROCEDURE — 25000128 H RX IP 250 OP 636: Performed by: NEUROLOGICAL SURGERY

## 2017-03-15 PROCEDURE — 36415 COLL VENOUS BLD VENIPUNCTURE: CPT | Performed by: NEUROLOGICAL SURGERY

## 2017-03-15 PROCEDURE — 25000132 ZZH RX MED GY IP 250 OP 250 PS 637: Performed by: STUDENT IN AN ORGANIZED HEALTH CARE EDUCATION/TRAINING PROGRAM

## 2017-03-15 PROCEDURE — 80048 BASIC METABOLIC PNL TOTAL CA: CPT | Performed by: NEUROLOGICAL SURGERY

## 2017-03-15 PROCEDURE — 25000132 ZZH RX MED GY IP 250 OP 250 PS 637: Performed by: NEUROLOGICAL SURGERY

## 2017-03-15 PROCEDURE — 70450 CT HEAD/BRAIN W/O DYE: CPT

## 2017-03-15 PROCEDURE — 25000128 H RX IP 250 OP 636: Performed by: STUDENT IN AN ORGANIZED HEALTH CARE EDUCATION/TRAINING PROGRAM

## 2017-03-15 RX ORDER — TRAMADOL HYDROCHLORIDE 50 MG/1
50 TABLET ORAL EVERY 6 HOURS PRN
Status: DISCONTINUED | OUTPATIENT
Start: 2017-03-15 | End: 2017-03-15 | Stop reason: HOSPADM

## 2017-03-15 RX ORDER — HYDROMORPHONE HCL/0.9% NACL/PF 0.2MG/0.2
0.2 SYRINGE (ML) INTRAVENOUS
Status: DISCONTINUED | OUTPATIENT
Start: 2017-03-15 | End: 2017-03-15 | Stop reason: HOSPADM

## 2017-03-15 RX ORDER — OXYCODONE HYDROCHLORIDE 5 MG/1
2.5-5 TABLET ORAL
Qty: 60 TABLET | Refills: 0 | Status: ON HOLD | OUTPATIENT
Start: 2017-03-15 | End: 2017-03-24

## 2017-03-15 RX ORDER — ACETAMINOPHEN 325 MG/1
650 TABLET ORAL EVERY 4 HOURS PRN
Qty: 100 TABLET
Start: 2017-03-17 | End: 2022-11-07

## 2017-03-15 RX ORDER — PROPOFOL 10 MG/ML
INJECTION, EMULSION INTRAVENOUS
Status: DISPENSED
Start: 2017-03-15 | End: 2017-03-16

## 2017-03-15 RX ADMIN — ONDANSETRON 4 MG: 2 INJECTION INTRAMUSCULAR; INTRAVENOUS at 07:06

## 2017-03-15 RX ADMIN — TRAMADOL HYDROCHLORIDE 50 MG: 50 TABLET, COATED ORAL at 06:16

## 2017-03-15 RX ADMIN — PANTOPRAZOLE SODIUM 40 MG: 40 TABLET, DELAYED RELEASE ORAL at 08:23

## 2017-03-15 RX ADMIN — PRIMIDONE 250 MG: 250 TABLET ORAL at 08:23

## 2017-03-15 RX ADMIN — HYDROMORPHONE HYDROCHLORIDE 0.2 MG: 10 INJECTION, SOLUTION INTRAMUSCULAR; INTRAVENOUS; SUBCUTANEOUS at 04:23

## 2017-03-15 RX ADMIN — GABAPENTIN 600 MG: 600 TABLET, FILM COATED ORAL at 08:23

## 2017-03-15 RX ADMIN — ACETAMINOPHEN 975 MG: 325 TABLET, FILM COATED ORAL at 08:23

## 2017-03-15 ASSESSMENT — PAIN DESCRIPTION - DESCRIPTORS
DESCRIPTORS: CONSTANT
DESCRIPTORS: ACHING

## 2017-03-15 ASSESSMENT — VISUAL ACUITY
OU: NORMAL ACUITY

## 2017-03-15 NOTE — PLAN OF CARE
Problem: Goal Outcome Summary  Goal: Goal Outcome Summary  D: Hilary is alert and oriented x4, pupils 4, round,brisk, tracks. GARCIA, all cms intact, continues with tremors to bilat UE, mouth, voice. Hilary is afeb, HR 50-60's, -120/60-80's, RR 12-20. sats O2 > 92% on room air. Used IS to 1200, LS clear and diminished, usual murmur. All extrem warm, normal pulses, good motion, good sensation. Jacobson out about MN, voided 25 cc up to commode x 1, real void pending, prior to removal 50cc/hour of clear urine. BS +, she is hungry. HA, dilaudid 0.2mg x 2, next ultram.   P: Hilary expects to discharge later today if CT is ok, plans to stay nearby one more night and then back to [place]. Maintain safety. Plan for next stage procedure.

## 2017-03-15 NOTE — TELEPHONE ENCOUNTER
"Sent the following email:    Dear Dr. Hernandez,    We implanted a [place] resident with the \"Abbott\" device and she will be having her monopolar survey here in about 4 weeks. This patient would like to follow up with you after that. Have you had the training on the Infinity device yet? Are you comfortable with this system yet? I have cc'd Clarisa Humphreys, the [employer] rep so that she has your email to follow up with you if needed.    All my best to you Dr. Hernandez!  "

## 2017-03-15 NOTE — PLAN OF CARE
Problem: Discharge Planning  Goal: Discharge Planning (Adult, OB, Behavioral, Peds)  Pt alert and oriented. Baseline. Intact. Understands discharge instructions. VSS. Pt refuses to take Oxycodone script home. Wants us to get rid of script. Aware that it helps with pain. Verbalized understanding, still refused to take it home. Per patient, pain is well managed with Tylenol. Follow up instructions given.

## 2017-03-15 NOTE — PROGRESS NOTES
[facility], [place]    Neurosurgery Daily Progress Note         Assessment   Hilary Cornelius is a 62 year old female who is postoperative day # 1 from stealth assisted left DBS placement phase I with VIM target.          Plan     Continue current pain control regimen.    Routine neuro exams per unit protocol.    HOB > 30 degrees.    Incentive spirometry Q1H while awake.    Regular diet.    Bowel regimen. Anti-emetics.     SCDs while in bed.    PT/OT --- mobilize.    Ambulate QID with assistance as tolerated.    Post-operative stealth head CT ordered for AM>     Dispo; Stable on 4A.     Please dial * * * and enter job code [phone] to reach the neurosurgery team if you have any questions.      Patient and above plan discussed with neurosurgery chief resident.         Subjective     Overnight events: No acute events.           Objective   Temp:  [97.6  F (36.4  C)-98.7  F (37.1  C)] 98.7  F (37.1  C)  Heart Rate:  [57-79] 61  Resp:  [12-16] 16  BP: ()/() 97/60  SpO2:  [92 %-99 %] 95 %    I/O last 3 completed shifts:  In: 1722.5 [P.O.:95; I.V.:1627.5]  Out: 1460 [Urine:1440; Blood:20]      Physical Exam  General: Appears comfortable, NAD  Wound: Incision, clean, dry, intact   Neurologic Exam:  - AOx3.  - Follows commands. Essential tremor.   - Speech fluent, spontaneous. No aphasia or dysarthria.  - No gaze preference. No apparent hemineglect.  - PERRL, EOMI.  - Face symmetric with sensation intact to light touch.  - Palate elevates symmetrically, uvula midline, tongue protrudes midline.  - Trapezii and sternocleidomastoid muscles 5/5 bilaterally.  - No pronator drift.  Motor: Normal bulk/tone; no tremor, rigidity, or bradykinesia.     Delt Bi Tri FE IP Quad Hamst TibAnt EHL Gastroc    C5 C6 C7 C8/T1 L2 L3 L4-S1 L4 L5 S1   R 5 5 5 5 5 5 5 5 5 5   L 5 5 5 5 5 5 5 5 5 5     Sensory:  intact to LT      Labs and Imaging     BMP  Recent Labs  Lab 03/13/17  1542      POTASSIUM 4.2    CHLORIDE 104   GISELA 8.7   CO2 27   BUN 12   CR 0.52   *     CBC  Recent Labs  Lab 03/13/17  1542   WBC 6.1   RBC 4.40   HGB 13.3   HCT 38.0   MCV 86   MCH 30.2   MCHC 35.0   RDW 13.1        INR  Recent Labs  Lab 03/13/17  1542   INR 0.99       Imaging:   Awaiting CT scan.     Contact the neurosurgery resident on call with questions.    Dial * * *[phone]: Enter [phone] when prompted.   Randell Hubbard MD, PhD  Neurosurgery PGY-2

## 2017-03-15 NOTE — DISCHARGE SUMMARY
Neurosurgery Discharge Summary    Hilary Cornelius MRN# [id number]   Age: 62 year old YOB: 1955     Date of Admission:  3/14/2017  Date of Discharge::  3/15/2017  Admitting Physician:  Henok Javier MD  Discharge Physician:  John M. Leschke, MD           Admission Diagnoses:   Essential Tremor   Essential tremor          Discharge Diagnosis:   same          Procedures:   Stealth Assisted Left Side Deep Brain Stimulator Placement, Phase I, Placement Of Deep Brain Stimulator Electrode, Target Left Ventral Intermediate Nucleus Of The Thalamus, With Microelectrode Recording         Discharge Medications:     Current Discharge Medication List      START taking these medications    Details   oxyCODONE (ROXICODONE) 5 MG IR tablet Take 0.5-1 tablets (2.5-5 mg) by mouth every 3 hours as needed for moderate to severe pain  Qty: 60 tablet, Refills: 0    Associated Diagnoses: Essential tremor      acetaminophen (TYLENOL) 325 MG tablet Take 2 tablets (650 mg) by mouth every 4 hours as needed for other (surgical pain)  Qty: 100 tablet    Associated Diagnoses: Essential tremor         CONTINUE these medications which have NOT CHANGED    Details   primidone (MYSOLINE) 250 MG tablet 1 in am and 2 in the evening      gabapentin (NEURONTIN) 600 MG tablet Take 600 mg by mouth 3 times daily      diphenhydrAMINE-acetaminophen (TYLENOL PM)  MG tablet Take 1 tablet by mouth as needed      calcium carbonate (OS-GISELA 600 MG [place]. [place]) 600 MG tablet Take 600 mg by mouth daily      Multiple vitamin TABS Take 1 tablet by mouth daily      albuterol (PROAIR HFA, PROVENTIL HFA, VENTOLIN HFA) 108 (90 BASE) MCG/ACT inhaler Inhale 2 puffs into the lungs every 6 hours      cyabnocobalamin (VITAMIN B-12) 2500 MCG sublingual tablet Place 2,500 mcg under the tongue daily      folic acid (FOLVITE) 400 MCG tablet Take 400 mcg by mouth daily                  Hospital Course:   The patient underwent the above mentioned procedure  without complication. She tolerated the procedure will and has no new post operative complaints or findings. Her HCT shows stable electrode placement and no acute findings. She received 3 doses of Clindamycin. She is stable for discharge.           Discharge Instructions and Follow-Up/Medication Changes:   As stated in AVS.         Discharge Disposition:   Discharged to home      John M. Leschke, MD

## 2017-03-17 ENCOUNTER — CARE COORDINATION (OUTPATIENT)
Dept: NEUROSURGERY | Facility: CLINIC | Age: 62
End: 2017-03-17

## 2017-03-17 NOTE — PROGRESS NOTES
Neurosurgery Discharge Coordination Note     Attending physician: Dr. Javier  Surgery performed: DBS lead placement  Date of Discharge: 3/25/17  Discharge to: Home     Current status: Patient states he she is feeling pretty good. Pain is minimal and is only taking acetaminophen when needed, but hasn't needed any today.   Denies redness, swelling, increased tenderness, drainage, incision opening or elevated temp. Reports Incision CDI without signs of infection.  Denies current bowel or bladder issues    Discharge instructions and medications reviewed with patient.  Follow up appointments/imaging/tests needed: 3/24/17 DBS battery placement    RN triage/on call number given: [phone]/ [phone]

## 2017-03-23 ENCOUNTER — TELEPHONE (OUTPATIENT)
Dept: NEUROSURGERY | Facility: CLINIC | Age: 62
End: 2017-03-23

## 2017-03-23 ENCOUNTER — ANESTHESIA EVENT (OUTPATIENT)
Dept: SURGERY | Facility: CLINIC | Age: 62
End: 2017-03-23
Payer: COMMERCIAL

## 2017-03-23 RX ORDER — GABAPENTIN 300 MG/1
600 CAPSULE ORAL ONCE
Status: CANCELLED | OUTPATIENT
Start: 2017-03-23 | End: 2017-03-23

## 2017-03-23 NOTE — TELEPHONE ENCOUNTER
Pt wanted to confirm that she can take her tremor medications tomorrow morning prior to surgery. She is having the DBS battery placed, so this is perfectly fine. No further questions at this time.

## 2017-03-23 NOTE — ANESTHESIA PREPROCEDURE EVALUATION
"Anesthesia Evaluation     . Pt has had prior anesthetic. Type: General and MAC    No history of anesthetic complications          ROS/MED HX    ENT/Pulmonary:     (+)Intermittent asthma Last exacerbation: 1 month ago,Treatment: Inhaler prn,  , . .    Neurologic:     (+)other neuro Essential tremor, effecting the head and both arms    Cardiovascular:     (+) ----. : . . . :. valvular problems/murmurs (Hx cardiac murmur) History of rheumatic fever:. No previous cardiac testing      (-) taking anticoagulants/antiplatelets   METS/Exercise Tolerance:  >4 METS   Hematologic:  - neg hematologic  ROS       Musculoskeletal:   (+) , , other musculoskeletal- 3rd digit in the left hand, S/P joint replacement, so it does not fully extend or bend.  Chronic back and right knee pain.  +      GI/Hepatic:     (+) GERD Other, hiatal hernia, Other GI/Hepatic History of bariatric surgery      Renal/Genitourinary:  - ROS Renal section negative       Endo:     (+) type II DM Not using insulin - not using insulin pump Normal glucose range: Diet controlled, states that blood glucose levels are \"good\" not previously admitted for DM/DKA .   (-) Type I DM   Psychiatric:  - neg psychiatric ROS       Infectious Disease:   (+) SBE Prophylaxis,       Malignancy:      - no malignancy   Other:    (+) H/O Chronic Pain,                 CT HEAD (3/15/2017):  Impression: Interval placement of high left frontal approach deep  brain stimulator with tip at the left ventral thalamus. Small amount  of postoperative pneumocephalus.  Physical Exam      Airway   Mallampati: III  TM distance: >3 FB  Neck ROM: full    Dental   (+) missing    Cardiovascular   Rhythm and rate: regular and normal  (+) murmur       Pulmonary    breath sounds clear to auscultation    Other findings: I have examined the Patient.  I agree with airway assessment.  Auscultation of lungs is unremarkable.  - Thompson Chaudhary MD                Anesthesia Plan      History & Physical " Review  History and physical reviewed and following examination; no interval change.    ASA Status:  3 .        Plan for General and ETT with Intravenous and Propofol induction. Maintenance will be Balanced.    PONV prophylaxis:  Ondansetron (or other 5HT-3) and Dexamethasone or Solumedrol  Additional equipment: Videolaryngoscope and 2nd IV Plan on general anesthesia with balanced technique.  Video laryngoscope in room.  Preoperative albuterol.  Routine Monitors.  - Thompson Chaudhary MD      Postoperative Care  Postoperative pain management:  IV analgesics, Oral pain medications and Multi-modal analgesia.      Consents  Anesthetic plan, risks, benefits and alternatives discussed with:  Patient.  Use of blood products discussed: No .   .        Prior PAC Clinic Assessment (3/13/2017):  She has the following specific operative considerations:   1. Hiatal hernia with occasional GERD symptoms: Consider use of RSI techniques should advanced airway maneuvers be required.  2. Asthma: Consider administration of a bronchodilator in the perioperative setting, if needed.   3. Essential tremor involving the head and upper extremities: Recommend that extra caution be exercised during transfers and with positioning in order to prevent injury.   4. History of rheumatic fever, presence of a cardiac murmur: SBE prophylaxis recommended.  5. Diabetes: Recommend close monitoring of blood glucose levels throughout the perioperative period.  6. Labs as ordered by Dr. Javier.     Revised Cardiac Risk Index: 0.4% risk of major adverse cardiac event.  Anesthesia considerations: Refer to PAC assessment in the anesthesia records.  VTE risk: 1.8%  ANNA MARIE risk: Low  PONV risk score= 2. (If > 2, anti-emetic intervention is recommended.)

## 2017-03-24 ENCOUNTER — ANESTHESIA (OUTPATIENT)
Dept: SURGERY | Facility: CLINIC | Age: 62
End: 2017-03-24
Payer: COMMERCIAL

## 2017-03-24 ENCOUNTER — HOSPITAL ENCOUNTER (OUTPATIENT)
Facility: CLINIC | Age: 62
Discharge: HOME | End: 2017-03-24
Attending: NEUROLOGICAL SURGERY | Admitting: NEUROLOGICAL SURGERY
Payer: COMMERCIAL

## 2017-03-24 VITALS
RESPIRATION RATE: 16 BRPM | HEIGHT: 67 IN | TEMPERATURE: 98.5 F | SYSTOLIC BLOOD PRESSURE: 135 MMHG | BODY MASS INDEX: 29.03 KG/M2 | DIASTOLIC BLOOD PRESSURE: 89 MMHG | WEIGHT: 184.97 LBS | OXYGEN SATURATION: 96 %

## 2017-03-24 DIAGNOSIS — G25.0 ESSENTIAL TREMOR: ICD-10-CM

## 2017-03-24 LAB — GLUCOSE BLDC GLUCOMTR-MCNC: 138 MG/DL (ref 70–99)

## 2017-03-24 PROCEDURE — S0077 INJECTION, CLINDAMYCIN PHOSP: HCPCS | Performed by: NEUROLOGICAL SURGERY

## 2017-03-24 PROCEDURE — 71000014 ZZH RECOVERY PHASE 1 LEVEL 2 FIRST HR: Performed by: NEUROLOGICAL SURGERY

## 2017-03-24 PROCEDURE — 25000128 H RX IP 250 OP 636: Performed by: NEUROLOGICAL SURGERY

## 2017-03-24 PROCEDURE — 27210794 ZZH OR GENERAL SUPPLY STERILE: Performed by: NEUROLOGICAL SURGERY

## 2017-03-24 PROCEDURE — 25000125 ZZHC RX 250: Performed by: NEUROLOGICAL SURGERY

## 2017-03-24 PROCEDURE — 27210995 ZZH RX 272: Performed by: NEUROLOGICAL SURGERY

## 2017-03-24 PROCEDURE — 82962 GLUCOSE BLOOD TEST: CPT

## 2017-03-24 PROCEDURE — 27810325 ZZHC OR IMPLANT OTHER OPNP: Performed by: NEUROLOGICAL SURGERY

## 2017-03-24 PROCEDURE — 25000128 H RX IP 250 OP 636: Performed by: STUDENT IN AN ORGANIZED HEALTH CARE EDUCATION/TRAINING PROGRAM

## 2017-03-24 PROCEDURE — S0020 INJECTION, BUPIVICAINE HYDRO: HCPCS | Performed by: NEUROLOGICAL SURGERY

## 2017-03-24 PROCEDURE — 40000170 ZZH STATISTIC PRE-PROCEDURE ASSESSMENT II: Performed by: NEUROLOGICAL SURGERY

## 2017-03-24 PROCEDURE — 25000132 ZZH RX MED GY IP 250 OP 250 PS 637: Performed by: STUDENT IN AN ORGANIZED HEALTH CARE EDUCATION/TRAINING PROGRAM

## 2017-03-24 PROCEDURE — 25800025 ZZH RX 258: Performed by: STUDENT IN AN ORGANIZED HEALTH CARE EDUCATION/TRAINING PROGRAM

## 2017-03-24 PROCEDURE — 71000027 ZZH RECOVERY PHASE 2 EACH 15 MINS: Performed by: NEUROLOGICAL SURGERY

## 2017-03-24 PROCEDURE — 25000566 ZZH SEVOFLURANE, EA 15 MIN: Performed by: NEUROLOGICAL SURGERY

## 2017-03-24 PROCEDURE — 27211024 ZZHC OR SUPPLY OTHER OPNP: Performed by: NEUROLOGICAL SURGERY

## 2017-03-24 PROCEDURE — 25000125 ZZHC RX 250: Performed by: STUDENT IN AN ORGANIZED HEALTH CARE EDUCATION/TRAINING PROGRAM

## 2017-03-24 PROCEDURE — 37000008 ZZH ANESTHESIA TECHNICAL FEE, 1ST 30 MIN: Performed by: NEUROLOGICAL SURGERY

## 2017-03-24 PROCEDURE — 36000057 ZZH SURGERY LEVEL 3 1ST 30 MIN - UMMC: Performed by: NEUROLOGICAL SURGERY

## 2017-03-24 PROCEDURE — 36000059 ZZH SURGERY LEVEL 3 EA 15 ADDTL MIN UMMC: Performed by: NEUROLOGICAL SURGERY

## 2017-03-24 PROCEDURE — 37000009 ZZH ANESTHESIA TECHNICAL FEE, EACH ADDTL 15 MIN: Performed by: NEUROLOGICAL SURGERY

## 2017-03-24 PROCEDURE — C1820 GENERATOR NEURO RECHG BAT SY: HCPCS | Performed by: NEUROLOGICAL SURGERY

## 2017-03-24 DEVICE — IMPLANTABLE DEVICE: Type: IMPLANTABLE DEVICE | Site: SCALP | Status: FUNCTIONAL

## 2017-03-24 RX ORDER — GLYCOPYRROLATE 0.2 MG/ML
INJECTION, SOLUTION INTRAMUSCULAR; INTRAVENOUS PRN
Status: DISCONTINUED | OUTPATIENT
Start: 2017-03-24 | End: 2017-03-24

## 2017-03-24 RX ORDER — ONDANSETRON 2 MG/ML
INJECTION INTRAMUSCULAR; INTRAVENOUS PRN
Status: DISCONTINUED | OUTPATIENT
Start: 2017-03-24 | End: 2017-03-24

## 2017-03-24 RX ORDER — CLINDAMYCIN HCL 150 MG
150 CAPSULE ORAL 3 TIMES DAILY
Qty: 21 CAPSULE | Refills: 0 | Status: SHIPPED | OUTPATIENT
Start: 2017-03-24 | End: 2017-03-31

## 2017-03-24 RX ORDER — LABETALOL HYDROCHLORIDE 5 MG/ML
10 INJECTION, SOLUTION INTRAVENOUS
Status: DISCONTINUED | OUTPATIENT
Start: 2017-03-24 | End: 2017-03-24 | Stop reason: HOSPADM

## 2017-03-24 RX ORDER — NEOSTIGMINE METHYLSULFATE 1 MG/ML
VIAL (ML) INJECTION PRN
Status: DISCONTINUED | OUTPATIENT
Start: 2017-03-24 | End: 2017-03-24

## 2017-03-24 RX ORDER — PROPOFOL 10 MG/ML
INJECTION, EMULSION INTRAVENOUS PRN
Status: DISCONTINUED | OUTPATIENT
Start: 2017-03-24 | End: 2017-03-24

## 2017-03-24 RX ORDER — NALOXONE HYDROCHLORIDE 0.4 MG/ML
.1-.4 INJECTION, SOLUTION INTRAMUSCULAR; INTRAVENOUS; SUBCUTANEOUS
Status: DISCONTINUED | OUTPATIENT
Start: 2017-03-24 | End: 2017-03-24 | Stop reason: HOSPADM

## 2017-03-24 RX ORDER — DEXAMETHASONE SODIUM PHOSPHATE 4 MG/ML
INJECTION, SOLUTION INTRA-ARTICULAR; INTRALESIONAL; INTRAMUSCULAR; INTRAVENOUS; SOFT TISSUE PRN
Status: DISCONTINUED | OUTPATIENT
Start: 2017-03-24 | End: 2017-03-24

## 2017-03-24 RX ORDER — SODIUM CHLORIDE, SODIUM LACTATE, POTASSIUM CHLORIDE, CALCIUM CHLORIDE 600; 310; 30; 20 MG/100ML; MG/100ML; MG/100ML; MG/100ML
INJECTION, SOLUTION INTRAVENOUS CONTINUOUS PRN
Status: DISCONTINUED | OUTPATIENT
Start: 2017-03-24 | End: 2017-03-24

## 2017-03-24 RX ORDER — LIDOCAINE HYDROCHLORIDE 20 MG/ML
INJECTION, SOLUTION INFILTRATION; PERINEURAL PRN
Status: DISCONTINUED | OUTPATIENT
Start: 2017-03-24 | End: 2017-03-24

## 2017-03-24 RX ORDER — LIDOCAINE 40 MG/G
CREAM TOPICAL
Status: DISCONTINUED | OUTPATIENT
Start: 2017-03-24 | End: 2017-03-24 | Stop reason: HOSPADM

## 2017-03-24 RX ORDER — ACETAMINOPHEN 325 MG/1
975 TABLET ORAL ONCE
Status: COMPLETED | OUTPATIENT
Start: 2017-03-24 | End: 2017-03-24

## 2017-03-24 RX ORDER — CLINDAMYCIN PHOSPHATE 900 MG/50ML
900 INJECTION, SOLUTION INTRAVENOUS SEE ADMIN INSTRUCTIONS
Status: DISCONTINUED | OUTPATIENT
Start: 2017-03-24 | End: 2017-03-24 | Stop reason: HOSPADM

## 2017-03-24 RX ORDER — OXYCODONE HYDROCHLORIDE 5 MG/1
5 TABLET ORAL EVERY 4 HOURS PRN
Qty: 30 TABLET | Refills: 0 | Status: SHIPPED | OUTPATIENT
Start: 2017-03-24 | End: 2018-03-22

## 2017-03-24 RX ORDER — FENTANYL CITRATE 50 UG/ML
25-50 INJECTION, SOLUTION INTRAMUSCULAR; INTRAVENOUS
Status: DISCONTINUED | OUTPATIENT
Start: 2017-03-24 | End: 2017-03-24 | Stop reason: HOSPADM

## 2017-03-24 RX ORDER — FENTANYL CITRATE 50 UG/ML
INJECTION, SOLUTION INTRAMUSCULAR; INTRAVENOUS PRN
Status: DISCONTINUED | OUTPATIENT
Start: 2017-03-24 | End: 2017-03-24

## 2017-03-24 RX ORDER — DEXMEDETOMIDINE HYDROCHLORIDE 4 UG/ML
0.2-1.2 INJECTION, SOLUTION INTRAVENOUS CONTINUOUS
Status: DISCONTINUED | OUTPATIENT
Start: 2017-03-24 | End: 2017-03-24 | Stop reason: HOSPADM

## 2017-03-24 RX ORDER — CLINDAMYCIN PHOSPHATE 900 MG/50ML
900 INJECTION, SOLUTION INTRAVENOUS
Status: COMPLETED | OUTPATIENT
Start: 2017-03-24 | End: 2017-03-24

## 2017-03-24 RX ORDER — ALBUTEROL SULFATE 0.83 MG/ML
2.5 SOLUTION RESPIRATORY (INHALATION) ONCE
Status: COMPLETED | OUTPATIENT
Start: 2017-03-24 | End: 2017-03-24

## 2017-03-24 RX ORDER — GABAPENTIN 300 MG/1
600 CAPSULE ORAL ONCE
Status: COMPLETED | OUTPATIENT
Start: 2017-03-24 | End: 2017-03-24

## 2017-03-24 RX ORDER — SODIUM CHLORIDE, SODIUM LACTATE, POTASSIUM CHLORIDE, CALCIUM CHLORIDE 600; 310; 30; 20 MG/100ML; MG/100ML; MG/100ML; MG/100ML
INJECTION, SOLUTION INTRAVENOUS CONTINUOUS
Status: DISCONTINUED | OUTPATIENT
Start: 2017-03-24 | End: 2017-03-24 | Stop reason: HOSPADM

## 2017-03-24 RX ADMIN — ONDANSETRON 4 MG: 2 INJECTION INTRAMUSCULAR; INTRAVENOUS at 09:55

## 2017-03-24 RX ADMIN — PROPOFOL 100 MG: 10 INJECTION, EMULSION INTRAVENOUS at 09:08

## 2017-03-24 RX ADMIN — FENTANYL CITRATE 100 MCG: 50 INJECTION, SOLUTION INTRAMUSCULAR; INTRAVENOUS at 09:05

## 2017-03-24 RX ADMIN — ROCURONIUM BROMIDE 40 MG: 10 INJECTION INTRAVENOUS at 09:08

## 2017-03-24 RX ADMIN — CLINDAMYCIN PHOSPHATE 900 MG: 18 INJECTION, SOLUTION INTRAVENOUS at 09:15

## 2017-03-24 RX ADMIN — LIDOCAINE HYDROCHLORIDE 100 MG: 20 INJECTION, SOLUTION INFILTRATION; PERINEURAL at 09:08

## 2017-03-24 RX ADMIN — ACETAMINOPHEN 975 MG: 325 TABLET, FILM COATED ORAL at 07:49

## 2017-03-24 RX ADMIN — GLYCOPYRROLATE 0.4 MG: 0.2 INJECTION, SOLUTION INTRAMUSCULAR; INTRAVENOUS at 09:59

## 2017-03-24 RX ADMIN — Medication 3 MG: at 09:59

## 2017-03-24 RX ADMIN — SODIUM CHLORIDE, POTASSIUM CHLORIDE, SODIUM LACTATE AND CALCIUM CHLORIDE: 600; 310; 30; 20 INJECTION, SOLUTION INTRAVENOUS at 08:45

## 2017-03-24 RX ADMIN — FENTANYL CITRATE 25 MCG: 50 INJECTION, SOLUTION INTRAMUSCULAR; INTRAVENOUS at 11:18

## 2017-03-24 RX ADMIN — GABAPENTIN 600 MG: 300 CAPSULE ORAL at 07:49

## 2017-03-24 RX ADMIN — DEXAMETHASONE SODIUM PHOSPHATE 4 MG: 4 INJECTION, SOLUTION INTRAMUSCULAR; INTRAVENOUS at 09:25

## 2017-03-24 RX ADMIN — ALBUTEROL SULFATE 2.5 MG: 2.5 SOLUTION RESPIRATORY (INHALATION) at 07:49

## 2017-03-24 RX ADMIN — FENTANYL CITRATE 25 MCG: 50 INJECTION, SOLUTION INTRAMUSCULAR; INTRAVENOUS at 11:21

## 2017-03-24 RX ADMIN — ROCURONIUM BROMIDE 10 MG: 10 INJECTION INTRAVENOUS at 09:31

## 2017-03-24 RX ADMIN — LIDOCAINE HYDROCHLORIDE 100 MG: 20 INJECTION, SOLUTION INFILTRATION; PERINEURAL at 09:07

## 2017-03-24 RX ADMIN — FENTANYL CITRATE 25 MCG: 50 INJECTION, SOLUTION INTRAMUSCULAR; INTRAVENOUS at 11:26

## 2017-03-24 ASSESSMENT — VISUAL ACUITY
OU: NORMAL ACUITY

## 2017-03-24 NOTE — BRIEF OP NOTE
[facility], [place]    Brief Operative Note    Pre-operative diagnosis: Essential Tremor   Post-operative diagnosis Essential Tremor  Procedure: Procedure(s):  Left Side Deep Brain Stimulator Placement, Phase II, Placement Of Deep Brain Stimulator Generator/Battery Over Left Chest - Wound Class: I-Clean  Surgeon: Surgeon(s) and Role:     * Henok Javier MD - Primary     * Parmjit Kumar MD - Resident - Assisting  Anesthesia: General   Estimated blood loss: 3 mL  Drains: None  Specimens: none  Findings:   [employer] IPG placed in pocket, all impedences wnl   Complications: None.  Implants: [employer] IPG, all impedences wnl,     Plan:  - Pain control  - D/c home

## 2017-03-24 NOTE — OP NOTE
DATE OF SERVICE: 03/24/2017     PREOPERATIVE DIAGNOSIS:  1. Essential tremor  2. S/p left side deep brain stimulator placement, phase I, placement of deep brain stimulator electrode, target left ventral intermediate nucleus of the thalamus with microelectrode recording on 3/14/2017     POSTOPERATIVE DIAGNOSIS:  1. Essential tremor  2. S/p left side deep brain stimulator placement, phase I, placement of deep brain stimulator electrode, target left ventral intermediate nucleus of the thalamus with microelectrode recording on 3/14/2017     PROCEDURE PERFORMED:  1. Deep brain stimulator placement, phase II, placement of new deep brain stimulator generator/battery, model Infinity, over left chest wall    2. Placement of one extension wire and connection of the left deep brain stimulator electrode, one array, to the new generator/battery    3. Electrical interrogation and analysis of deep brain stimulator system.      ATTENDING SURGEON: Henok Javier MD, PhD     RESIDENT SURGEON: Parmjit Kumar MD, PhD     ANESTHESIA: General anesthesia.     ESTIMATED BLOOD LOSS: 3 mL.      COMPLICATIONS: No complications were encountered.    FINDINGS: All impedance values were within normal. No problems found.,        IMPLANTS: [employer] Infinity 5, model #6660 (serial number [id number]) with an Infinity extension wire (serial #[id number]).     INDICATIONS FOR PROCEDURE: Ms. Hilary Cornelius returns for her ongoing DBS surgery. She is s/p left side deep brain stimulator placement, phase I, placement of deep brain stimulator electrode, target left ventral intermediate nucleus of the thalamus with microelectrode recording on 3/14/2017. The surgery was without complications and she was discharged on POD#1. Patient reports some minor headaches, especially at the connector burial site and feeling tired but denies any other new symptoms. She denies fevers, chills, nausea, vomiting, dizziness, weakness, numbness or seizure like activity. She has  "an [employer] (formerly [employer]) segmented DBS lead inserted. Briefly, she is a 61 year old right handed female with history of essential tremor. She has had tremor as long as she can remember even as a child. She began getting treatment for her tremor 20 years ago with various medications including Propranolol, Topamax, and Sinemet. Currently she takes Gabapentin 900 mg/day and Primidone 750 mg/day. She was evaluated at [facility] back in 1990s but she was not offered any treatment. Her tremors have gotten worse in the past few years and it is socially embarrassing for her. Alcohol may help her tremor.  She has a head tremor and a vocal as well as a hand tremor. Her hand tremor affects her ability to write and she has a hard time writing and has a stamp. She has to use both hands in holding a glass and has to cover cups and uses a straw. She has to have her glass on the table and uses a straw. She has problems keeping food on utensils. She has had some problems with balance. Her memory is fine.  She denies mood disorders. She has a history of intermittent headaches. Her goals for DBS is to be able to \"hold a glass of water without spilling, cut things without cutting my fingers into pieces, write my name like an adult and not like a [age]-year-old, and eat food without spilling all over.\" She was seen by Laura Bonilla, Neurology APRN, on 11/09/2016 and was given a tremor score of 68. She has a history of gastric bypass and is on calcium, Vit B12 and folate. She lives in [place], which is about 5 hours away.  Her neurologist in [place] is Dr. Ybarra who referred her for DBS. Her case was discussed at the Movement Disorder Consensus Group Meeting and she was considered to be a good candidate for DBS, treating the right side of her body with the left sided implantation. She would also be a \"wait and see\" candidate for the other side. We discussed phase II of DBS surgery, placement of the DBS generator/battery " over the left chest wall under general anesthesia. Risks, benefits, alternative therapies were discussed with the patient, including but not limited to infection and bleeding. Surgical procedure was discussed in detail. All questions were answered, and she expressed understanding.      DESCRIPTION OF PROCEDURE: The patient was taken to the operating theater and positioned supine on the operating room table.  All pressure points were appropriately padded.  With placement of the endotracheal tube, general endotracheal anesthesia was induced.  Her head was turned to the right side, thus exposing the left parietal area of the head.  A shoulder roll was placed under the left shoulder. The previously implanted connector portion of the stimulating electrode from the left side could be palpated on the left side of the head.  A small area of hair was removed over this palpable connector using a surgical hair clipper.  Then the left side of the head, neck and chest area was prepped and draped in normal sterile fashion.  Local anesthetic was injected in the areas of intended incision at the left scalp and left chest wall as well as along the path of the intended tunneling.  A total of 20 mL of 1% lidocaine with epinephrine mixed with 0.25% Marcaine plain in a 50:50 combination was used.  A timeout was performed confirming the patient's identity, procedure to be performed, site and side of surgery and administration of preoperative prophylactic antibiotics.      Attention was turned to the head.  A #10 blade scalpel was used to make a 2 cm skin incision at the distal end of the connector that was palpated in her scalp at the left parietal area.  Hemostasis was achieved with monopolar cautery.  The incision was carried down to the pericranium.  The protective cover was visible. The mosquito was used to hold the protective cover, and we gently pulled out the connector. It was slowly teased out of the incision with enough length  of the wire for manipulation. This was found to be fully intact.  At that point, a pocket was made using a Jessica clamp down toward behind the ear into the nuchal line.  This was in preparation for tunneling of the extension wires. The Jessica clamp was then used to create a pocket along the superior edge of the existing pocket.     Attention was then turned to the left chest wall area. Using a skin knife, a linear incision of approximately 6 cm was made over the left chest wall. The incision was then further deepened using a combination of sharp and blunt dissection technique until we reached a tissue plane that was anterior/superficial to the pectoralis muscle. This plane was less than 1 cm deep from the skin. Then, using a blunt dissection technique, a pocket was created in an inferior direction over the left chest wall. The pocket was then generously irrigated with antibiotic solution and meticulous hemostasis was obtained. No active bleeding was noted.     At this point, a tunneler was brought into the field.  We tunnel a passage from the head incision to the chest wall incision. Care was taken to stay superficial and the path of the tunneler was confirmed to be over the clavicle. The tunneler ware removed, leaving behind a plastic sheath. One extension wire was passed from the head incision to the chest incision. Subsequently, the plastic sheath was pulled out from the chest incision, leaving behind the tunneled extension wire.     We then proceeded to make the connection between the left intracranial lead and the extension wire. The protective covering was removed from the connector portion of the stimulating electrode. The contacts were inspected to be clean and without damage. Then, the stimulating electrode was inserted into the extension wire connection. The connection was secured with screwdriver. Then, the extension wire was gently pulled from the chest incision and the excess wire length towards the head  was also buried superiorly until the connector was within the incision. Then, the connector was buried further superiorly in the incision until it was no longer directly under the incision.    At this time, a new Infinity generator/battery was brought onto the field. The extension wire was then cleaned at its contacts and inserted into the generator/battery portal. The extension wire for the left sided implant was placed into the front connector portal.  A plug was placed in the back connector portal. These were secured with a screwdriver. Then, the new generator/battery with the excess wires being placed posterior to the generator/battery was placed within the left chest wall pocket that was created previously. The entire apparatus fit into the pocket comfortably. The generator/battery was anchored to the pocket using two 2-0 Ethibond sutures.     The system was tested electrically. All the impedances of the left stimulating electrode was within normal. No problems were found with the system.     With the satisfactory placement of the new system, we began closing the wound. The left chest incision was closed in the following manner. The deep pocket was reapproximated using 3-0 Vicryl sutures in an inverted interrupted fashion. The subcutaneous fat layer was reapproximated using 3-0 Vicryl sutures in an inverted interrupted fashion. The dermal layer was reapproximated using 3-0 Vicryl sutures in an inverted interrupted fashion. The skin was reapproximated using 4-0 Monocryl in a subcuticular fashion. The left parieto-occipital head incision was irrigated with antibiotic solution and dried. Meticulous hemostasis was obtained. It was then closed in the following manner. The galea was reapproximated over the implanted hardware using 3-0 Vicryl sutures in an inverted interrupted fashion. This provided a protective layer. The skin was then reapproximated using 4-0 Monocryl in a running fashion. Both wounds were cleaned  and dried. ChorPrep was used to clean the incisions again. Then, Dermabond was applied.     At the end of the case, all counts including needle, sponge and instrument counts were correct x 2. There were no complications. Patient was extubated and taken to the recovery room in a stable condition.  Dr. Javier was present and scrubbed throughout the entirety of the case.       HENOK JAVIER MD      As dictated by ARIN HANNAH MD, PHD       NEUROSURGERY ATTENDING ATTESTATION: IHenok M.D., Ph.D., Neurosurgery Attending, was present and scrubbed for the entire case and performed the key and critical portions of the case.      D: 2017 11:11   T: 2017 11:39   MT: JUAN     Name:     RONY MARTIN   MRN:      [id number]        Account:        [id number]   :      1955           Procedure Date: 2017     Document: [id number]

## 2017-03-24 NOTE — IP AVS SNAPSHOT
Same Day Surgery [address] [address]    Phone:  [phone]                                       After Visit Summary   3/24/2017    Hilary Cornelius    MRN: [id number]           After Visit Summary Signature Page     I have received my discharge instructions, and my questions have been answered. I have discussed any challenges I see with this plan with the nurse or doctor.    ..........................................................................................................................................  Patient/Patient Representative Signature      ..........................................................................................................................................  Patient Representative Print Name and Relationship to Patient    ..................................................               ................................................  Date                                            Time    ..........................................................................................................................................  Reviewed by Signature/Title    ...................................................              ..............................................  Date                                                            Time

## 2017-03-24 NOTE — IP AVS SNAPSHOT
MRN:[id number]                      After Visit Summary   3/24/2017    Hilary Cornelius    MRN: [id number]           Thank you!     Thank you for choosing [facility] for your care. Our goal is always to provide you with excellent care. Hearing back from our patients is one way we can continue to improve our services. Please take a few minutes to complete the written survey that you may receive in the mail after you visit with us. Thank you!        Patient Information     Date Of Birth          1955        About your hospital stay     You were admitted on:  March 24, 2017 You last received care in the:  [facility]    You were discharged on:  March 24, 2017       Who to Call     For medical emergencies, please call 911.  For non-urgent questions about your medical care, please call your primary care provider or clinic, [phone]  For questions related to your surgery, please call your surgery clinic        Attending Provider     Provider Henok Alarcon MD Neurosurgery       Primary Care Provider Office Phone # Fax #    Zachary Holguin [phone]       [address] [address]        Further instructions from your care team       You underwent surgery place a  deep brain stimulator  by Henok Javier MD, PhD      - Your sutures are absorbable.     - You will have follow up scheduled with the physician assistants and/or nurse practitioners in our clinic 2 weeks after your surgery.  If you live far away, you may see your primary care doctor for a wound check at 2 weeks.     - If you have not heard from our clinic about your follow up visit by 3-4 days following your discharge, please call our clinic at [phone] to schedule an appointment with the Neurosurgery teams.     - Take the full course of Clindamycin (21 pills total)    - Schedule an appointment with neurology in 4 weeks to turn on your device  and begin programming your DBS.  Call [phone] to make an appointment in Neurology clinic.      After discharge, your activity restrictions are:   -We encourage short frequent walks, increasing as tolerated.  - No driving until you are seen in clinic and cleared by your neurosurgeon.  If you have had a seizure, you may not drive for at least 3 months according to [place] law.    - No strenuous activity.  - No lifting more than 10 pounds until you are seen in clinic (a gallon of milk weighs approximately 8 pounds)    Wound care  - You are ok to shower, but do not soak your incisions. Pat them dry if they get damp.   - No baths, hot tubs or pools for 4-6 weeks after surgery.       Call if you have any of the followin. Temperature greater than 101.5 F.   2. Any redness, swelling or discharge from the wound.   3. Any new weakness, numbness or altered mental status.  4. Worsening pain that is not improving with the pain medications you were prescribed.     Call [phone] or after 5:00 pm or on weekends call [phone] and ask for the neurosurgery resident on call. Thank You.     [facility] [place]  Same-Day Surgery   Adult Discharge Orders & Instructions     For 24 hours after surgery    1. Get plenty of rest.  A responsible adult must stay with you for at least 24 hours after you leave the hospital.   2. Do not drive or use heavy equipment.  If you have weakness or tingling, don't drive or use heavy equipment until this feeling goes away.  3. Do not drink alcohol.  4. Avoid strenuous or risky activities.  Ask for help when climbing stairs.   5. You may feel lightheaded.  IF so, sit for a few minutes before standing.  Have someone help you get up.   6. If you have nausea (feel sick to your stomach): Drink only clear liquids such as apple juice, ginger ale, broth or 7-Up.  Rest may also help.  Be sure to drink enough fluids.  Move to a regular diet as you feel able.  7. You  "may have a slight fever. Call the doctor if your fever is over 100 F (37.7 C) (taken under the tongue) or lasts longer than 24 hours.  8. You may have a dry mouth, a sore throat, muscle aches or trouble sleeping.  These should go away after 24 hours.  9. Do not make important or legal decisions.   Call your doctor for any of the followin.  Signs of infection (fever, growing tenderness at the surgery site, a large amount of drainage or bleeding, severe pain, foul-smelling drainage, redness, swelling).    2. It has been over 8 to 10 hours since surgery and you are still not able to urinate (pass water).    To contact a doctor, call __Dr Javier's office at [phone]-- Neurosurgery__ or:        [phone] and ask for the resident on call for Surgery (answered 24 hours a day)      Emergency Department:    [facility]: [phone]       (TTY for hearing impaired: [phone])              Pending Results     No orders found from 3/22/2017 to 3/25/2017.            Admission Information     Date & Time Provider Department Dept. Phone    3/24/2017 Henok Javier MD Same Day Surgery [facility] [phone]      Your Vitals Were     Blood Pressure Temperature Respirations Height Weight Pulse Oximetry    138/82 98.2  F (36.8  C) (Oral) 16 1.689 m (5' 6.5\") 83.9 kg (184 lb 15.5 oz) 95%    BMI (Body Mass Index)                   29.41 kg/m2           [employer] Information     [employer] gives you secure access to your electronic health record. If you see a primary care provider, you can also send messages to your care team and make appointments. If you have questions, please call your primary care clinic.  If you do not have a primary care provider, please call [phone] and they will assist you.        Care EveryWhere ID     This is your Care EveryWhere ID. This could be used by other organizations to access your [facility] medical records  [id number]           Review of your medicines      START " taking        Dose / Directions    clindamycin 150 MG capsule   Commonly known as:  CLEOCIN   Used for:  Essential tremor        Dose:  150 mg   Take 1 capsule (150 mg) by mouth 3 times daily for 7 days   Quantity:  21 capsule   Refills:  0         CONTINUE these medicines which may have CHANGED, or have new prescriptions. If we are uncertain of the size of tablets/capsules you have at home, strength may be listed as something that might have changed.        Dose / Directions    oxyCODONE 5 MG IR tablet   Commonly known as:  ROXICODONE   This may have changed:    - how much to take  - when to take this   Used for:  Essential tremor        Dose:  5 mg   Take 1 tablet (5 mg) by mouth every 4 hours as needed for moderate to severe pain   Quantity:  30 tablet   Refills:  0         CONTINUE these medicines which have NOT CHANGED        Dose / Directions    acetaminophen 325 MG tablet   Commonly known as:  TYLENOL   Used for:  Essential tremor        Dose:  650 mg   Take 2 tablets (650 mg) by mouth every 4 hours as needed for other (surgical pain)   Quantity:  100 tablet   Refills:  0       albuterol 108 (90 BASE) MCG/ACT Inhaler   Commonly known as:  PROAIR HFA/PROVENTIL HFA/VENTOLIN HFA        Dose:  2 puff   Inhale 2 puffs into the lungs every 6 hours   Refills:  0       calcium carbonate 600 MG tablet   Commonly known as:  OS-GISELA 600 mg [place]. Ca        Dose:  600 mg   Take 600 mg by mouth daily   Refills:  0       cyabnocobalamin 2500 MCG sublingual tablet   Commonly known as:  VITAMIN B-12        Dose:  2500 mcg   Place 2,500 mcg under the tongue daily   Refills:  0       diphenhydrAMINE-acetaminophen  MG tablet   Commonly known as:  TYLENOL PM        Dose:  1 tablet   Take 1 tablet by mouth as needed   Refills:  0       folic acid 400 MCG tablet   Commonly known as:  FOLVITE        Dose:  400 mcg   Take 400 mcg by mouth daily   Refills:  0       gabapentin 600 MG tablet   Commonly known as:  NEURONTIN         Dose:  600 mg   Take 600 mg by mouth 3 times daily   Refills:  0       Multiple vitamin Tabs        Dose:  1 tablet   Take 1 tablet by mouth daily   Refills:  0       primidone 250 MG tablet   Commonly known as:  MYSOLINE        1 in am and 2 in the evening   Refills:  0            Where to get your medicines      Some of these will need a paper prescription and others can be bought over the counter. Ask your nurse if you have questions.     Bring a paper prescription for each of these medications     clindamycin 150 MG capsule    oxyCODONE 5 MG IR tablet                Protect others around you: Learn how to safely use, store and throw away your medicines at www.disposemymeds.org.             Medication List: This is a list of all your medications and when to take them. Check marks below indicate your daily home schedule. Keep this list as a reference.      Medications           Morning Afternoon Evening Bedtime As Needed    acetaminophen 325 MG tablet   Commonly known as:  TYLENOL   Take 2 tablets (650 mg) by mouth every 4 hours as needed for other (surgical pain)   Last time this was given:  975 mg on 3/24/2017  7:49 AM                                albuterol 108 (90 BASE) MCG/ACT Inhaler   Commonly known as:  PROAIR HFA/PROVENTIL HFA/VENTOLIN HFA   Inhale 2 puffs into the lungs every 6 hours                                calcium carbonate 600 MG tablet   Commonly known as:  OS-GISELA 600 mg [place]. Ca   Take 600 mg by mouth daily                                clindamycin 150 MG capsule   Commonly known as:  CLEOCIN   Take 1 capsule (150 mg) by mouth 3 times daily for 7 days                                cyabnocobalamin 2500 MCG sublingual tablet   Commonly known as:  VITAMIN B-12   Place 2,500 mcg under the tongue daily                                diphenhydrAMINE-acetaminophen  MG tablet   Commonly known as:  TYLENOL PM   Take 1 tablet by mouth as needed                                folic acid 400 MCG  tablet   Commonly known as:  FOLVITE   Take 400 mcg by mouth daily                                gabapentin 600 MG tablet   Commonly known as:  NEURONTIN   Take 600 mg by mouth 3 times daily                                Multiple vitamin Tabs   Take 1 tablet by mouth daily                                oxyCODONE 5 MG IR tablet   Commonly known as:  ROXICODONE   Take 1 tablet (5 mg) by mouth every 4 hours as needed for moderate to severe pain                                primidone 250 MG tablet   Commonly known as:  MYSOLINE   1 in am and 2 in the evening

## 2017-03-24 NOTE — ANESTHESIA POSTPROCEDURE EVALUATION
Patient: Hilary Cornelius    Procedure(s):  Left Side Deep Brain Stimulator Placement, Phase II, Placement Of Deep Brain Stimulator Generator/Battery Over Left Chest - Wound Class: I-Clean    Diagnosis:Essential Tremor   Diagnosis Additional Information: No value filed.    Anesthesia Type:  General, ETT    Note:  Anesthesia Post Evaluation    Patient location during evaluation: PACU  Patient participation: Able to fully participate in evaluation  Level of consciousness: awake and alert  Pain management: adequate  Airway patency: patent  Cardiovascular status: acceptable  Respiratory status: acceptable  Hydration status: acceptable  PONV: none             Last vitals:  Vitals:    03/24/17 1100 03/24/17 1115 03/24/17 1130   BP: 126/80 125/73 138/82   Resp: 16 16 16   Temp: 36.8  C (98.2  F)     SpO2: 100% 98% 95%         Electronically Signed By: Thompson Chaudhary MD  March 24, 2017  11:43 AM

## 2017-03-24 NOTE — H&P
NEUROSURGERY    HISTORY AND PHYSICAL EXAM UPDATE          Chief Complaint   Patient presents with     Consult       Ongoing DBS implantation surgery for Essential Tremor, s/p left side deep brain stimulator placement, phase I, placement of deep brain stimulator electrode, target left ventral intermediate nucleus of the thalamus with microelectrode recording on 3/14/2017         HISTORY OF PRESENT ILLNESS  Ms. Hilary Cornelius returns for her ongoing DBS surgery.  She is s/p left side deep brain stimulator placement, phase I, placement of deep brain stimulator electrode, target left ventral intermediate nucleus of the thalamus with microelectrode recording on 3/14/2017. The surgery was without complications and she was discharged on POD#1. Patient reports some minor headaches, especially at the [place] and feeling tired but denies any other new symptoms. She denies fevers, chills, nausea, vomiting, dizziness, weakness, numbness or seizure like activity. She has an [employer] ([employer]) segmented DBS lead inserted. Briefly, she is a 61 year old right handed female with history of essential tremor. She has had tremor as long as she can remember even as a child. She began getting treatment for her tremor 20 years ago with various medications including Propranolol, Topamax, and Sinemet. Currently she takes Gabapentin 900 mg/day and Primidone 750 mg/day. She was evaluated at [facility] back in 1990s but she was not offered any treatment. Her tremors have gotten worse in the past few years and it is socially embarrassing for her. Alcohol may help her tremor.  She has a head tremor and a vocal as well as a hand tremor. Her hand tremor affects her ability to write and she has a hard time writing and has a stamp. She has to use both hands in holding a glass and has to cover cups and uses a straw. She has to have her glass on the table and uses a straw. She has problems keeping food on utensils. She  "has had some problems with balance. Her memory is fine.  She denies mood disorders. She has a history of intermittent headaches. Her goals for DBS is to be able to \"hold a glass of water without spilling, cut things without cutting my fingers into pieces, write my name like an adult and not like a [age]-year-old, and eat food without spilling all over.\" She was seen by Laura Bonilla, Neurology APRN, on 11/09/2016 and was given a tremor score of 68. She has a history of gastric bypass and is on calcium vit b12 when remembers and folate. She lives in [place], which is about 5 hours away.  Her neurologist in [place] is Dr. Ybarra who referred her for DBS.      Past Medical History:   Diagnosis Date     Chronic pain 11/8/2016     Cognitive disorder 12/4/2016 2016 evaluation   IMPRESSIONS AND RECOMMENDATIONS  Current results indicate overall intellectual functioning estimated fall in the mildly impaired range, marginally below premorbid estimates of low average based on single word reading abilities. She demonstrates a relative inefficiency in learning of new material, but retains information quite well once she has learned it. Visual-spatial abilities     Controlled type 2 diabetes mellitus without complication (H) 12/19/2008     Exercise-induced asthma      Gastroesophageal reflux disease 3/30/2015    XR ESOPHAGRAM7/8/2016  [facility]  Result Narrative  This document is currently in Final Status  Exam Accession# [id number]  XR ESOPHAGRAM  CLINICAL INFORMATION: Dysphagia;   COMPARISON: None.  FINDINGS: The esophagus is normal in course and caliber in this post gastric bypass patient. No intrinsic or extrinsic mass lesions are identified. No strictures. There is a small hiatal hernia. Gastroesophageal reflux is observed during this examination  with numerous tertiary contractions and delayed esophageal clearance.  IMPRESSION:  1. Gastroesophageal reflux with numerous tertiary contractions and delayed esophageal " clearance. 2. Sliding hiatal hernia in this post gastric bypass patient.  Dictated By: Dandre Wyatt MD 7/8/2016 9:47 AM Edited By: MIKE 7/8/2016 10:02 AM  Electronically Signed: Dandre Wyatt MD 7/8/2016 4:24 PM   Status Results Details         H/O bariatric surgery 11/08/2016     Headache disorder 11/05/2016     Heart murmur 11/8/2016     Rheumatic fever 11/8/2016     Tremor 11/5/2016     Past Surgical History:   Procedure Laterality Date     APPENDECTOMY       CHOLECYSTECTOMY       COLONOSCOPY       GASTRIC BYPASS       OPTICAL TRACKING SYSTEM INSERTION DEEP BRAIN STIMULATION Left 3/14/2017    Procedure: OPTICAL TRACKING SYSTEM INSERTION DEEP BRAIN STIMULATION;  Surgeon: Henok Javier MD;  Location: [facility] OR     thoracic spine surgery       TUBAL LIGATION       Social History     Social History     Marital status:      Spouse name: N/A     Number of children: N/A     Years of education: N/A     Occupational History     Not on file.     Social History Main Topics     Smoking status: Never Smoker     Smokeless tobacco: Never Used     Alcohol use 0.0 oz/week     0 Standard drinks or equivalent per week      Comment: social     Drug use: Not on file     Sexual activity: Not on file     Other Topics Concern     Not on file     Social History Narrative    from [facility].  to Adilosn Siddiqui        [facility] HISTORY: The patient was born in [place], [place]. The patient was educated formally through 12 years. The patient is presently disabled due to her spinal difficulties. The patient has been  40 years. The patient has 3 children of that marriage. The patient does not use tobacco. The patient has social use of alcohol. The patient has no history of drug or intravenous drug use or abuse.         FAMILY HISTORY: A strong family history of tremor with mother having tremor, but also a strong paternal family history of tremor with father with multiple cousins. Patient does have one  cousin with Parkinson disease. The patients father suffered with dementia of the Alzheimer type.     2 Daughters and son    Daughter lives 20 miles away in [place] from her her    Daughter lives in [place]    Son lives in [place]        [place] [facility]     Family History   Problem Relation Age of Onset     Tremor Mother      Cardiomyopathy Mother      had surgery at [place]     Dementia Father      Alzheimer's disease     Parkinsonism Other      paternal aunt's son - cousin     Tremor Daughter                Allergies   Allergen Reactions     Bee Venom Swelling     Penicillins         rash             Current Outpatient Prescriptions   Medication     calcium carbonate (OS-GISELA 600 MG [place]. [place]) 600 MG tablet     clonazePAM (KLONOPIN) 0.5 MG tablet     lisinopril (PRINIVIL,ZESTRIL) 5 MG tablet     Multiple vitamin TABS     albuterol (VENTOLIN HFA) 108 (90 BASE) MCG/ACT inhaler     HYDROcodone-acetaminophen (NORCO) 5-325 MG per tablet     omeprazole (PRILOSEC) 20 MG capsule     primidone (MYSOLINE) 250 MG tablet     albuterol (PROAIR HFA, PROVENTIL HFA, VENTOLIN HFA) 108 (90 BASE) MCG/ACT inhaler     cyabnocobalamin (VITAMIN B-12) 2500 MCG sublingual tablet     folic acid (FOLVITE) 400 MCG tablet     gabapentin (NEURONTIN) 600 MG tablet      No current facility-administered medications for this visit.         REVIEW OF SYSTEMS  General: POSITIVE for headaches. Negative for difficulty sleeping, chills/sweats/fever, fatigue, weight gain or loss.  Skin: negative for color changes of the hands or feet in the cold, chronic skin itching, poor scarring/non-healing ulcer, skin rashes or hives, or unusual moles.   Eyes: negative for blurred vision, crossed eyes, double vision, eye infection or vision flashes or halos  Ears/Nose/Throat: negative for bleeding gums, difficulty swallowing, negative for earache, ear discharge or hearing loss.  Respiratory: POSITIVE for shortness of breath with  exertion. Negative for asthma, chronic cough, coughing up blood, night sweats tuberculosis, wheezing.  Cardiovascular: POSITIVE for heart murmurs. Negative for lower extremity swelling, chest pain, difficulty breathing at night, irregular heart beat, pacemaker, varicose vein.  Gastrointestinal: POSITIVE for heartburn. Negative for black stools, blood in stool, chronic diarrhea, Hepatitis A, B, C, constipation, liver disease, nausea, vomiting.  Genitourinary: negative for difficulty with urination, discharges, urgency, urinary tract infection.  Musculoskeletal: POSITIVE for arthritis in knees. Negative for joint swelling, muscle tenderness, osteoporosis.  Neurologic: POSITIVE for headaches and tremors. Negative numbness of arms or legs, problem with memory, tingling of hands, arms or legs.  Psychiatric: negative for anxiety, depression, panic attacks, restlessness  Hematologic/Lymphatic/Immunologic: POSITIVE for easy skin bruising but negative workup. Negative for marked fatigue, prolonged bleeding from cuts or tooth extractions, tender glands/lymph nodes.  Endocrine: negative for excessive hunger/thirst, intolerance to warm room, loss of libido, multiple broken bones, rapid weight gain/loss, thyroid problems, spontaneous nipple discharge.  Allergic: negative for hay fever or asthma.        PHYSICAL EXAM  Temp: 98.4  F (36.9  C) Temp src: Oral BP: (!) 84/62   Heart Rate: 85 Resp: 16 SpO2: 96 % O2 Device: None (Room air)       General: Awake, alert, oriented. Well nourished, well developed. She is not in any acute distress.  HEENT: Head normocephalic, atraumatic. No carotid bruit. Neck supple. Good range of motion. No palpable thyroid mass.  Heart: Regular rhythm and rate. Heart murmur present.  Lungs: Clear to auscultation and percussion bilaterally. No ronchi, rales or wheeze.  Abdomen: Soft, non-tender, non-distended. No hepatosplenomegaly.  Extremity: Warm with no clubbing or cyanosis. No lower extremity  edema.  Left frontal incision clean/dry and intact. No fluid collection. No drainage. No redness.     Neurological  Awake, alert and oriented to date, time, place and person. Speech fluent.   Pupils equal, round, reactive to light.  Extraocular movement intact.  Visual fields are full on confrontation.  Hearing is grossly normal to finger rub.   Facial sensation intact.  Face symmetric.  Tongue midline.  Uvula elevates equally.     Motor: full strength throughout.  Sensation: intact to light touch and pinpoint.  Deep tendon reflexes: 3+ throughout. Negative for clonus. Negative for Garcia's sign. No dysmetria.  Tremors with activity, worse on the right side.        ASSESSMENT   61 year old right handed female with essential tremor.  s/p left side deep brain stimulator placement, phase I, placement of deep brain stimulator electrode, target left ventral intermediate nucleus of the thalamus with microelectrode recording on 3/14/2017.    Patient is doing well. Her tremors have returned.     During today's visit, we discussed phase II of DBS surgery, placement of the DBS generator/battery over the left chest wall under general anesthesia. Risks, benefits, alternative therapies were discussed with the patient, including but not limited to infection and bleeding. Surgical procedure was discussed in detail. All questions were answered, and she expressed understanding.       PLAN:  1. Deep brain stimulator placement, phase II, placement of deep brain stimulator generator/battery over the left chest wall - Infinity ([employer]) generator/battery, smaller model.

## 2017-03-24 NOTE — ANESTHESIA CARE TRANSFER NOTE
Patient: Hilary Cornelius    Procedure(s):  Left Side Deep Brain Stimulator Placement, Phase II, Placement Of Deep Brain Stimulator Generator/Battery Over Left Chest - Wound Class: I-Clean    Diagnosis: Essential Tremor   Diagnosis Additional Information: No value filed.    Anesthesia Type:   General, ETT     Note:  Airway :Face Mask  Patient transferred to:PACU  Comments: Prior to extubation, patient breathing spontaneously and respiratory rate and tidal volume were appropriate. The patient was following commands. The patient was warm and demonstrated adequate strength. Patient was suctioned and extubated without complication.   Transported to PACU   VSS upon arrival   Care transferred to PACU RN      Vitals: (Last set prior to Anesthesia Care Transfer)    CRNA VITALS  3/24/2017 1006 - 3/24/2017 1039      3/24/2017             Resp Rate (set): 10                Electronically Signed By: Darren Choe MD  March 24, 2017  10:39 AM

## 2017-03-24 NOTE — DISCHARGE INSTRUCTIONS
You underwent surgery place a  deep brain stimulator  by Henok Javier MD, PhD      - Your sutures are absorbable.     - You will have follow up scheduled with the physician assistants and/or nurse practitioners in our clinic 2 weeks after your surgery.  If you live far away, you may see your primary care doctor for a wound check at 2 weeks.     - If you have not heard from our clinic about your follow up visit by 3-4 days following your discharge, please call our clinic at [phone] to schedule an appointment with the Neurosurgery teams.     - Take the full course of Clindamycin (21 pills total)    - Schedule an appointment with neurology in 4 weeks to turn on your device and begin programming your DBS.  Call [phone] to make an appointment in Neurology clinic.      After discharge, your activity restrictions are:   -We encourage short frequent walks, increasing as tolerated.  - No driving until you are seen in clinic and cleared by your neurosurgeon.  If you have had a seizure, you may not drive for at least 3 months according to [place] law.    - No strenuous activity.  - No lifting more than 10 pounds until you are seen in clinic (a gallon of milk weighs approximately 8 pounds)    Wound care  - You are ok to shower, but do not soak your incisions. Pat them dry if they get damp.   - No baths, hot tubs or pools for 4-6 weeks after surgery.       Call if you have any of the followin. Temperature greater than 101.5 F.   2. Any redness, swelling or discharge from the wound.   3. Any new weakness, numbness or altered mental status.  4. Worsening pain that is not improving with the pain medications you were prescribed.     Call [phone] or after 5:00 pm or on weekends call [phone] and ask for the neurosurgery resident on call. Thank You.     [facility] [place]  Same-Day Surgery   Adult Discharge Orders & Instructions     For 24 hours after surgery    1. Get  plenty of rest.  A responsible adult must stay with you for at least 24 hours after you leave the hospital.   2. Do not drive or use heavy equipment.  If you have weakness or tingling, don't drive or use heavy equipment until this feeling goes away.  3. Do not drink alcohol.  4. Avoid strenuous or risky activities.  Ask for help when climbing stairs.   5. You may feel lightheaded.  IF so, sit for a few minutes before standing.  Have someone help you get up.   6. If you have nausea (feel sick to your stomach): Drink only clear liquids such as apple juice, ginger ale, broth or 7-Up.  Rest may also help.  Be sure to drink enough fluids.  Move to a regular diet as you feel able.  7. You may have a slight fever. Call the doctor if your fever is over 100 F (37.7 C) (taken under the tongue) or lasts longer than 24 hours.  8. You may have a dry mouth, a sore throat, muscle aches or trouble sleeping.  These should go away after 24 hours.  9. Do not make important or legal decisions.   Call your doctor for any of the followin.  Signs of infection (fever, growing tenderness at the surgery site, a large amount of drainage or bleeding, severe pain, foul-smelling drainage, redness, swelling).    2. It has been over 8 to 10 hours since surgery and you are still not able to urinate (pass water).    To contact a doctor, call __Dr Javier's office at [phone]-- Neurosurgery__ or:        [phone] and ask for the resident on call for Surgery (answered 24 hours a day)      Emergency Department:    [facility]: [phone]       (TTY for hearing impaired: [phone])

## 2017-03-28 ENCOUNTER — CARE COORDINATION (OUTPATIENT)
Dept: NEUROSURGERY | Facility: CLINIC | Age: 62
End: 2017-03-28

## 2017-03-28 NOTE — PROGRESS NOTES
Left  for pt re status since DBS battery placement on 3/24. Requested a call back at her earliest convenience.

## 2017-03-30 ENCOUNTER — TELEPHONE (OUTPATIENT)
Dept: NEUROLOGY | Facility: CLINIC | Age: 62
End: 2017-03-30

## 2017-03-30 DIAGNOSIS — G25.0 ESSENTIAL TREMOR: Primary | ICD-10-CM

## 2017-03-30 NOTE — TELEPHONE ENCOUNTER
"Called Dr. Hernandez's office in [place]. Spoke to \"Kain\". Wanted to get patient on Dr. Hernandez's schedule. Told Kain the initial programming date of 4/19 and stated the patient probably should f/u with Dr. Hernandez about 8 weeks later which would bring it to mid June. Gave her all of my contact information including; direct line, pager number. She will send message to Dr. Hernandez and get back to me. I will then contact the patient to discuss the appointment date and time, etc.    "

## 2017-03-30 NOTE — TELEPHONE ENCOUNTER
"Patient states she is still feeling tired and weak. Once in awhile she feels her \"brain is in a fog.\" Had surgery 3/14 for DBS lead placement. She believes the battery placement took more out of her than the brain surgery.    She reports her tremor is back but not quite as bad. Initial programming appointment made for 4/19 with Dr. Flanagan.  "

## 2017-04-03 DIAGNOSIS — G25.0 ESSENTIAL TREMOR: Primary | ICD-10-CM

## 2017-04-03 NOTE — TELEPHONE ENCOUNTER
Received VM from Dr. David Finnegan's  at [place] in [place]. They have reserved Thursday 5/18 at 10:00 am for the patient's second programming appointment.    Spoke to patient to let her know about this programming date and time. She asked me to send her records to Dr. Hernandez's office. This I will do of course. I added Dr. Hernandez to the Care Team with updated contact information.    Faxed:  1. Face sheet/demographics  2. Referral  3. Neuropsych report  4. Tremor testing report  5. Candidacy form  6. Op report lead insertion  7. Op report IPG insertion

## 2017-04-19 ENCOUNTER — OFFICE VISIT (OUTPATIENT)
Dept: NEUROLOGY | Facility: CLINIC | Age: 62
End: 2017-04-19

## 2017-04-19 VITALS
BODY MASS INDEX: 28.88 KG/M2 | SYSTOLIC BLOOD PRESSURE: 133 MMHG | HEIGHT: 67 IN | DIASTOLIC BLOOD PRESSURE: 80 MMHG | WEIGHT: 184 LBS | HEART RATE: 92 BPM

## 2017-04-19 DIAGNOSIS — G25.0 ESSENTIAL TREMOR: Primary | ICD-10-CM

## 2017-04-19 NOTE — LETTER
2017       RE: iHlary Cornelius  [address]   [place] [address]     Dear Colleague,    Thank you for referring your patient, Hilary Cornelius, to the [facility] NEUROLOGY at [facility]. Please see a copy of my visit note below.          PROCEDURE NOTE    PCP: Zachary Holguin  Referring Provider: Dr. Jus Ybarra  Patient: Hilary Cornelius  : 1955  DEO: 2017    Vital Signs: /80, P 92, Weight 184 lb    Chief Complaint: tremors    HPI: The HPI is described in the following notes:  ------------------------------------------------------------------  1. Dr. Keith's EPIC progress Note dated 2016  2. Laura Bonilla's EPIC Progress Note dated 2016      3. Dr. Javier's H&P and EPIC Progress Note dated 11/10/2016  4. Ivana Martin's Candidacy Form discussed on 2016  5. Brief EPIC Op Note dated 2017     Additional information provided during today's clinic visit: The patient denied having any post-op microlesioning effect.  Her tremors are only slightly suppressed by primidone but become much worse when she stops taking it.  She has Type II DM that is under dietary control.       Tremor Checklist  -----------------------  Handedness: Right handed  Speech: Quavering while holding a high note  Resting tremor: There is no resting tremor when the fingers of both hands are completely at rest  Postural tremor: Bilateral proximal > distal tremor with outstretched arms, much greater amplitude when the arms are held in the batwing position   Kinetic tremor: Marked action and terminal intention tremors while performing FTN   Head tremor: Persistent yes-yes head tremor, worse when attempting to drink from a cup  Finger-to-nose testing (FTN): Marked kinetic and terminal intention tremor, worse on the LEFT  Sequential finger tapping (SFT): Slow, inaccurate, contaminated by action tremor, L = R  Drinking from a cup: Unable to drink water in a cup with both  "hands   Rapid alternating movements: Well performed with both UEs (fisting and mu-cake)  Station and Gait: Not stooped, normal stride and pace, evoked distal tremor in both hands while walking  Pivot turns: Unable to perform, nearly fell when she tried to pivot  Toe tapping: Slow and slightly arrhythmic with both feet    Relevant Medications:  ------------------------------   Primidone 250 m tab in the morning, 2 tabs in the evening  Gabapentin: 800 mg: tid    Allergies: Asa [aspirin]; Bee venom; and Penicillins    Current Problem List: See Problem List    PMH:  has a past medical history of Chronic pain (2016); Cognitive disorder (2016); Controlled type 2 diabetes mellitus without complication (H) (2008); Exercise-induced asthma; Gastroesophageal reflux disease (3/30/2015); H/O bariatric surgery (2016); Headache disorder (2016); Heart murmur (2016); Rheumatic fever (2016); and Tremor (2016).    PSH:  has a past surgical history that includes thoracic spine surgery; colonoscopy; Cholecystectomy; appendectomy; tubal ligation; gastric bypass; Optical Tracking System Insertion Deep Brain Stimulation (Left, 3/14/2017); and Implant Deep Brain Stimulation Generator / Battery (Left, 3/24/2017).    FH: family history includes Cardiomyopathy in her mother; Dementia in her father; Parkinsonism in an other family member; Tremor in her daughter and mother.  There is no family history of dystonia, tremor, Parkinson's disease or other neurodegenerative disorder.    SH: The patient drinks about two alcoholic beverages/week, and others notice that this \"helps her tremor\".     Motor Examination: Muscle tone in both UEs is normal, but tone is markedly increased in the neck muscles with cogwheeling.          PROGRAMMING LOG    Group A  -----------  Left IPG battery life: 3 years and 8 months  Left Vim initial settings: c+/1-, 0.00 mA, 60 usec, 130 Hz (initial parameter " values)  Increase current to 0.25 mA, other parameter values unchanged -> No change from baselin performance, no side effects  Increase current to 0.50 mA, other parameter values unchanged -> No change from baselin performance, no side effects   Increase current to 0.75 mA, other parameter values unchanged -> No change from baselin performance, no side effects   Increase current to 2.25 mA, other parameter values unchanged -> Transient tingling in right finger tips, dysarthria, partial suppression of postural tremor, FTN and SFT in right hand, no change in head tremor    Increase current to 3.00 mA, other parameter values unchanged -> Transient tingling in right finger tips, dysarthria, best tremor suppression yet; no change in head tremor    Decrease current to 2.00 mA, other parameter values unchanged -> transient tingling in right finger tips, NO dysarthria, good tremor suppression, no change in head tremor  Increase pw from 60 to 90 usec, previous settings unchanged -> persistent side effect: numbness involving hand and right arm      Left Vim settings: c+/2-, 0.00 mA, 60 usec, 130 Hz (initial parameter values)  Increase current to 1.00 mA, other parameter values unchanged -> No side effects, modest suppression of right postural tremor, FTN and SFT contaminated by action tremor, terminal intention with FTN  Increase current to 2.00 mA, other parameter values unchanged -> No side effects, increased suppression of right postural tremor, improvement in FTN and SFT, very slight terminal intention with RUE FTN  Increase current to 3.00 mA, other parameter values unchanged -> Marked pulling of right corner of the mouth to the right, marked dysarthria  Decrease current to 2.50 mA, other parameter values unchanged -> Modest pulling of right corner of the mouth to the right, still dysarthric  Decrease current to 2.00 mA, other parameter values unchanged -> No side effects, similar to previous 2.oo mA test but less tremor  suppression (postural, FTN and SFT), no effect on head tremor      Repeat of c+/1-, 2.00 mA, 60 usec, 130 Hz -> same results as before (see above)  Repeat of c+/2-, 2.00 mA, 60 usec, 130 Hz -> same result as c+/1- above, but FTN was not performed quite as well    Left Vim final settings: c+/1-, 2.00 mA, 60 usec, 130 Hz  Allocated current range: none given    Assessment  --------------------  1. 62-year-old right-handed  female with a strong family history of ET, disabling head, voice and upper extremity tremors (L > R) only very slightly suppressed by high-dose primidone, and significant imbalance while ambulating.  2. The absence of a post-op microlesioning effect and UE tremors with a marked proximal and rotatory component, and low-voltage thresholds for sensory and motor side effects may not augur well for long-term tremor suppression.         3. The patient's RUE postural tremor was 90% suppressed with Left Vim final settings (see above), and her action and terminal intention tremors were 80% suppressed while performing the FTN test      4. The patient's marked head tremor was not at all suppressed by stimulation of the LEFT Vim.      Plan  -------  1. Patient was taught how to turn off Abbott IPG using the patient controller; however, no current range was provided at this time.  2. Patient will be followed by Dr. Hernandez in [place], [place]  3. Patient will return in September for a recheck and for additional programming if necessary.     I spent 2.0 hours with the patient: all of the time was used for performing a monopolar survey using the Abbott's clinician .  This constant-current procedure required serial movement-disorder examinations in order to identify target electrodes and determine appropriate current settings.    JOANNA Flanagan, MSc ([employer]), MD  [facility]    cc: Josseline Hernandez MD Curtis W, Penney, MD

## 2017-04-19 NOTE — LETTER
2017    RE: Hilary Cornelius  [address]   [facility] [address]           PROCEDURE NOTE    PCP: Zachary Holguin  Referring Provider: Dr. Jus Ybarra  Patient: Hilary Cornelius  : 1955  DEO: 2017    Vital Signs: /80, P 92, Weight 184 lb    Chief Complaint: tremors    HPI: The HPI is described in the following notes:  ------------------------------------------------------------------  1. Dr. Keith's EPIC progress Note dated 2016  2. Laura Bonilla's EPIC Progress Note dated 2016      3. Dr. Javier's H&P and EPIC Progress Note dated 11/10/2016  4. Ivana Martin's Candidacy Form discussed on 2016  5. Brief EPIC Op Note dated 2017     Additional information provided during today's clinic visit: The patient denied having any post-op microlesioning effect.  Her tremors are only slightly suppressed by primidone but become much worse when she stops taking it.  She has Type II DM that is under dietary control.       Tremor Checklist  -----------------------  Handedness: Right handed  Speech: Quavering while holding a high note  Resting tremor: There is no resting tremor when the fingers of both hands are completely at rest  Postural tremor: Bilateral proximal > distal tremor with outstretched arms, much greater amplitude when the arms are held in the batwing position   Kinetic tremor: Marked action and terminal intention tremors while performing FTN   Head tremor: Persistent yes-yes head tremor, worse when attempting to drink from a cup  Finger-to-nose testing (FTN): Marked kinetic and terminal intention tremor, worse on the LEFT  Sequential finger tapping (SFT): Slow, inaccurate, contaminated by action tremor, L = R  Drinking from a cup: Unable to drink water in a cup with both hands   Rapid alternating movements: Well performed with both UEs (fisting and mu-cake)  Station and Gait: Not stooped, normal stride and pace, evoked distal tremor in both hands while walking  Pivot  "turns: Unable to perform, nearly fell when she tried to pivot  Toe tapping: Slow and slightly arrhythmic with both feet    Relevant Medications:  ------------------------------   Primidone 250 m tab in the morning, 2 tabs in the evening  Gabapentin: 800 mg: tid    Allergies: Asa [aspirin]; Bee venom; and Penicillins    Current Problem List: See Problem List    PMH:  has a past medical history of Chronic pain (2016); Cognitive disorder (2016); Controlled type 2 diabetes mellitus without complication (H) (2008); Exercise-induced asthma; Gastroesophageal reflux disease (3/30/2015); H/O bariatric surgery (2016); Headache disorder (2016); Heart murmur (2016); Rheumatic fever (2016); and Tremor (2016).    PSH:  has a past surgical history that includes thoracic spine surgery; colonoscopy; Cholecystectomy; appendectomy; tubal ligation; gastric bypass; Optical Tracking System Insertion Deep Brain Stimulation (Left, 3/14/2017); and Implant Deep Brain Stimulation Generator / Battery (Left, 3/24/2017).    FH: family history includes Cardiomyopathy in her mother; Dementia in her father; Parkinsonism in an other family member; Tremor in her daughter and mother.  There is no family history of dystonia, tremor, Parkinson's disease or other neurodegenerative disorder.    SH: The patient drinks about two alcoholic beverages/week, and others notice that this \"helps her tremor\".     Motor Examination: Muscle tone in both UEs is normal, but tone is markedly increased in the neck muscles with cogwheeling.          PROGRAMMING LOG    Group A  -----------  Left IPG battery life: 3 years and 8 months  Left Vim initial settings: c+/1-, 0.00 mA, 60 usec, 130 Hz (initial parameter values)  Increase current to 0.25 mA, other parameter values unchanged -> No change from baselin performance, no side effects  Increase current to 0.50 mA, other parameter values unchanged -> No change from baselin " performance, no side effects   Increase current to 0.75 mA, other parameter values unchanged -> No change from baselin performance, no side effects   Increase current to 2.25 mA, other parameter values unchanged -> Transient tingling in right finger tips, dysarthria, partial suppression of postural tremor, FTN and SFT in right hand, no change in head tremor    Increase current to 3.00 mA, other parameter values unchanged -> Transient tingling in right finger tips, dysarthria, best tremor suppression yet; no change in head tremor    Decrease current to 2.00 mA, other parameter values unchanged -> transient tingling in right finger tips, NO dysarthria, good tremor suppression, no change in head tremor  Increase pw from 60 to 90 usec, previous settings unchanged -> persistent side effect: numbness involving hand and right arm      Left Vim settings: c+/2-, 0.00 mA, 60 usec, 130 Hz (initial parameter values)  Increase current to 1.00 mA, other parameter values unchanged -> No side effects, modest suppression of right postural tremor, FTN and SFT contaminated by action tremor, terminal intention with FTN  Increase current to 2.00 mA, other parameter values unchanged -> No side effects, increased suppression of right postural tremor, improvement in FTN and SFT, very slight terminal intention with RUE FTN  Increase current to 3.00 mA, other parameter values unchanged -> Marked pulling of right corner of the mouth to the right, marked dysarthria  Decrease current to 2.50 mA, other parameter values unchanged -> Modest pulling of right corner of the mouth to the right, still dysarthric  Decrease current to 2.00 mA, other parameter values unchanged -> No side effects, similar to previous 2.oo mA test but less tremor suppression (postural, FTN and SFT), no effect on head tremor      Repeat of c+/1-, 2.00 mA, 60 usec, 130 Hz -> same results as before (see above)  Repeat of c+/2-, 2.00 mA, 60 usec, 130 Hz -> same result as c+/1-  above, but FTN was not performed quite as well    Left Vim final settings: c+/1-, 2.00 mA, 60 usec, 130 Hz  Allocated current range: none given    Assessment  --------------------  1. 62-year-old right-handed  female with a strong family history of ET, disabling head, voice and upper extremity tremors (L > R) only very slightly suppressed by high-dose primidone, and significant imbalance while ambulating.  2. The absence of a post-op microlesioning effect and UE tremors with a marked proximal and rotatory component, and low-voltage thresholds for sensory and motor side effects may not augur well for long-term tremor suppression.         3. The patient's RUE postural tremor was 90% suppressed with Left Vim final settings (see above), and her action and terminal intention tremors were 80% suppressed while performing the FTN test      4. The patient's marked head tremor was not at all suppressed by stimulation of the LEFT Vim.      Plan  -------  1. Patient was taught how to turn off Abbott IPG using the patient controller; however, no current range was provided at this time.  2. Patient will be followed by Dr. Hernandez in [place], [place]  3. Patient will return in September for a recheck and for additional programming if necessary.     I spent 2.0 hours with the patient: all of the time was used for performing a monopolar survey using the Abbott's clinician .  This constant-current procedure required serial movement-disorder examinations in order to identify target electrodes and determine appropriate current settings.    JOANNA Flanagan, MSc ([employer]), MD  [facility]    cc: Josseline Hernandez MD Curtis W, Penney, MD

## 2017-04-19 NOTE — MR AVS SNAPSHOT
After Visit Summary   4/19/2017    Hilary Cornelius    MRN: [id number]           Patient Information     Date Of Birth          1955        Visit Information        Provider Department      4/19/2017 1:30 PM Randell Flanagan MD [facility]        Today's Diagnoses     Essential tremor    -  1       Follow-ups after your visit        Follow-up notes from your care team     Return in about 3 months (around 7/19/2017), or if symptoms worsen or fail to improve, for recheck.      Your next 10 appointments already scheduled     Sep 01, 2017  1:30 PM CDT   (Arrive by 1:15 PM)   Return Movement Disorder with Randell Flanagan MD   [facility] ([facility])    [address] [address] [phone]              Who to contact     Please call your clinic at [phone] to:    Ask questions about your health    Make or cancel appointments    Discuss your medicines    Learn about your test results    Speak to your doctor   If you have compliments or concerns about an experience at your clinic, or if you wish to file a complaint, please contact [facility] Patient Relations at [phone] or email us at [email or web address]         Additional Information About Your Visit        MyChart Information     SOLOt gives you secure access to your electronic health record. If you see a primary care provider, you can also send messages to your care team and make appointments. If you have questions, please call your primary care clinic.  If you do not have a primary care provider, please call [phone] and they will assist you.      Ringly is an electronic gateway that provides easy, online access to your medical records. With Ringly, you can request a clinic appointment, read your test results, renew a prescription or communicate with your care team.     To access your existing account,  "please contact your [facility] or call [phone] for assistance.        Care EveryWhere ID     This is your Care EveryWhere ID. This could be used by other organizations to access your [place] medical records  [id number]        Your Vitals Were     Pulse Height BMI (Body Mass Index)             92 1.689 m (5' 6.5\") 29.25 kg/m2          Blood Pressure from Last 3 Encounters:   04/19/17 133/80   03/24/17 135/89   03/15/17 122/82    Weight from Last 3 Encounters:   04/19/17 83.5 kg (184 lb)   03/24/17 83.9 kg (184 lb 15.5 oz)   03/15/17 85.1 kg (187 lb 9.8 oz)              Today, you had the following     No orders found for display       Primary Care Provider Office Phone # Fax #    Zachary Holguin [phone]       [place] [address] [address] [address]        Thank you!     Thank you for choosing [facility]  for your care. Our goal is always to provide you with excellent care. Hearing back from our patients is one way we can continue to improve our services. Please take a few minutes to complete the written survey that you may receive in the mail after your visit with us. Thank you!             Your Updated Medication List - Protect others around you: Learn how to safely use, store and throw away your medicines at www.disposemymeds.org.          This list is accurate as of: 4/19/17  4:01 PM.  Always use your most recent med list.                   Brand Name Dispense Instructions for use    acetaminophen 325 MG tablet    TYLENOL    100 tablet    Take 2 tablets (650 mg) by mouth every 4 hours as needed for other (surgical pain)       albuterol 108 (90 BASE) MCG/ACT Inhaler    PROAIR HFA/PROVENTIL HFA/VENTOLIN HFA     Inhale 2 puffs into the lungs every 6 hours       calcium carbonate 600 MG tablet    OS-GISELA 600 mg [place]. Ca     Take 600 mg by mouth daily       cyabnocobalamin 2500 MCG sublingual tablet    VITAMIN B-12     Place 2,500 mcg " under the tongue daily       diphenhydrAMINE-acetaminophen  MG tablet    TYLENOL PM     Take 1 tablet by mouth as needed       folic acid 400 MCG tablet    FOLVITE     Take 400 mcg by mouth daily       gabapentin 600 MG tablet    NEURONTIN     Take 600 mg by mouth 3 times daily       Multiple vitamin Tabs      Take 1 tablet by mouth daily       oxyCODONE 5 MG IR tablet    ROXICODONE    30 tablet    Take 1 tablet (5 mg) by mouth every 4 hours as needed for moderate to severe pain       primidone 250 MG tablet    MYSOLINE     1 in am and 2 in the evening

## 2017-04-19 NOTE — PROGRESS NOTES
PROCEDURE NOTE    PCP: Zachary Holguin  Referring Provider: Dr. Jus Ybarra  Patient: Hilary Cornelius  : 1955  DEO: 2017    Vital Signs: /80, P 92, Weight 184 lb    Chief Complaint: tremors    HPI: The HPI is described in the following notes:  ------------------------------------------------------------------  1. Dr. Keith's EPIC progress Note dated 2016  2. Laura Bonilla's EPIC Progress Note dated 2016      3. Dr. Javier's H&P and EPIC Progress Note dated 11/10/2016  4. Ivana Martin's Candidacy Form discussed on 2016  5. Brief EPIC Op Note dated 2017     Additional information provided during today's clinic visit: The patient denied having any post-op microlesioning effect.  Her tremors are only slightly suppressed by primidone but become much worse when she stops taking it.  She has Type II DM that is under dietary control.       Tremor Checklist  -----------------------  Handedness: Right handed  Speech: Quavering while holding a high note  Resting tremor: There is no resting tremor when the fingers of both hands are completely at rest  Postural tremor: Bilateral proximal > distal tremor with outstretched arms, much greater amplitude when the arms are held in the batwing position   Kinetic tremor: Marked action and terminal intention tremors while performing FTN   Head tremor: Persistent yes-yes head tremor, worse when attempting to drink from a cup  Finger-to-nose testing (FTN): Marked kinetic and terminal intention tremor, worse on the LEFT  Sequential finger tapping (SFT): Slow, inaccurate, contaminated by action tremor, L = R  Drinking from a cup: Unable to drink water in a cup with both hands   Rapid alternating movements: Well performed with both UEs (fisting and mu-cake)  Station and Gait: Not stooped, normal stride and pace, evoked distal tremor in both hands while walking  Pivot turns: Unable to perform, nearly fell when she tried to pivot  Toe  "tapping: Slow and slightly arrhythmic with both feet    Relevant Medications:  ------------------------------   Primidone 250 m tab in the morning, 2 tabs in the evening  Gabapentin: 800 mg: tid    Allergies: Asa [aspirin]; Bee venom; and Penicillins    Current Problem List: See Problem List    PMH:  has a past medical history of Chronic pain (2016); Cognitive disorder (2016); Controlled type 2 diabetes mellitus without complication (H) (2008); Exercise-induced asthma; Gastroesophageal reflux disease (3/30/2015); H/O bariatric surgery (2016); Headache disorder (2016); Heart murmur (2016); Rheumatic fever (2016); and Tremor (2016).    PSH:  has a past surgical history that includes thoracic spine surgery; colonoscopy; Cholecystectomy; appendectomy; tubal ligation; gastric bypass; Optical Tracking System Insertion Deep Brain Stimulation (Left, 3/14/2017); and Implant Deep Brain Stimulation Generator / Battery (Left, 3/24/2017).    FH: family history includes Cardiomyopathy in her mother; Dementia in her father; Parkinsonism in an other family member; Tremor in her daughter and mother.  There is no family history of dystonia, tremor, Parkinson's disease or other neurodegenerative disorder.    SH: The patient drinks about two alcoholic beverages/week, and others notice that this \"helps her tremor\".     Motor Examination: Muscle tone in both UEs is normal, but tone is markedly increased in the neck muscles with cogwheeling.          PROGRAMMING LOG    Group A  -----------  Left IPG battery life: 3 years and 8 months  Left Vim initial settings: c+/1-, 0.00 mA, 60 usec, 130 Hz (initial parameter values)  Increase current to 0.25 mA, other parameter values unchanged -> No change from baselin performance, no side effects  Increase current to 0.50 mA, other parameter values unchanged -> No change from baselin performance, no side effects   Increase current to 0.75 mA, other " parameter values unchanged -> No change from baselin performance, no side effects   Increase current to 2.25 mA, other parameter values unchanged -> Transient tingling in right finger tips, dysarthria, partial suppression of postural tremor, FTN and SFT in right hand, no change in head tremor    Increase current to 3.00 mA, other parameter values unchanged -> Transient tingling in right finger tips, dysarthria, best tremor suppression yet; no change in head tremor    Decrease current to 2.00 mA, other parameter values unchanged -> transient tingling in right finger tips, NO dysarthria, good tremor suppression, no change in head tremor  Increase pw from 60 to 90 usec, previous settings unchanged -> persistent side effect: numbness involving hand and right arm      Left Vim settings: c+/2-, 0.00 mA, 60 usec, 130 Hz (initial parameter values)  Increase current to 1.00 mA, other parameter values unchanged -> No side effects, modest suppression of right postural tremor, FTN and SFT contaminated by action tremor, terminal intention with FTN  Increase current to 2.00 mA, other parameter values unchanged -> No side effects, increased suppression of right postural tremor, improvement in FTN and SFT, very slight terminal intention with RUE FTN  Increase current to 3.00 mA, other parameter values unchanged -> Marked pulling of right corner of the mouth to the right, marked dysarthria  Decrease current to 2.50 mA, other parameter values unchanged -> Modest pulling of right corner of the mouth to the right, still dysarthric  Decrease current to 2.00 mA, other parameter values unchanged -> No side effects, similar to previous 2.oo mA test but less tremor suppression (postural, FTN and SFT), no effect on head tremor      Repeat of c+/1-, 2.00 mA, 60 usec, 130 Hz -> same results as before (see above)  Repeat of c+/2-, 2.00 mA, 60 usec, 130 Hz -> same result as c+/1- above, but FTN was not performed quite as well    Left Vim final  settings: c+/1-, 2.00 mA, 60 usec, 130 Hz  Allocated current range: none given    Assessment  --------------------  1. 62-year-old right-handed  female with a strong family history of ET, disabling head, voice and upper extremity tremors (L > R) only very slightly suppressed by high-dose primidone, and significant imbalance while ambulating.  2. The absence of a post-op microlesioning effect and UE tremors with a marked proximal and rotatory component, and low-voltage thresholds for sensory and motor side effects may not augur well for long-term tremor suppression.         3. The patient's RUE postural tremor was 90% suppressed with Left Vim final settings (see above), and her action and terminal intention tremors were 80% suppressed while performing the FTN test      4. The patient's marked head tremor was not at all suppressed by stimulation of the LEFT Vim.      Plan  -------  1. Patient was taught how to turn off Abbott IPG using the patient controller; however, no current range was provided at this time.  2. Patient will be followed by Dr. Hernandez in [place], [place]  3. Patient will return in September for a recheck and for additional programming if necessary.     I spent 2.0 hours with the patient: all of the time was used for performing a monopolar survey using the Abbott's clinician .  This constant-current procedure required serial movement-disorder examinations in order to identify target electrodes and determine appropriate current settings.    Segundo Mcdonough ([place]), MD  [facility]    cc: Josseline Hernandez MD Curtis W, [employer]ney, MD

## 2017-04-28 NOTE — PROGRESS NOTES
Left Vim  X:-12.71, Y:-7.23, Z:+0.08, AP:55.3, MSP:18.6    Pen1, Track1  Center hole  -3.05 possible proprioceptive (proximal UE)  -1.59 probable tactile (orolingual)  -1.47 definite tactile (orolingual)  +0.70 bottom of thalamus  microstim gave paresthesias, distal RUE at +2.01, face from +1.0 up to -3.2, at thresholds of 5-10uA from +2.01 to -2.0, rising to 40uA at -3.2      Pen1, Track2  Posterior hole  -9.04 possible ventral tier  -6.66 -6.23 proprioceptive (distal UE)  -2.02 tactile (orolingual)  more tactile (orolingual) until  -0.11 bottom of thalamus  microstim gave paresthesias in the face up to -3.2, thresholds around 20uA up to -2.0, rising to 40uA at -3.2    Frame shift:  2 mm anterior, & 1 mm lateral    Pen 2, Track 1  Center hole  -4.40 possible ventral tier  -0.12 spindle (background only) (forearm)  +0.85 tactile (distal UE)  a few more tactile distal UE until  +1.8 bottom of thalamus  +2.47 possible (bacground only) tongue-protrusion (presumed tactile)  microstim gave paresthesias, all fqce or tongue, from 3.0 up to -3.0, thresholds 10-20uA below 0.0, and rising, gradually, from 30uA at 0.0 to 50uA at -3.0 and >100 (max tested) at -4.0    Frame shift 1 mm anterior    Pen3, Track1  Anterior hole  almost no units recorded.    Macro Pen 1  StJ/Abbot lead  Same frame position as Pen3  Anterior hole  Depth 0.0  130Hz, 60 usec  [id number]  Transient paresthesias (face) from 2.0mA)  Tremor reduction increasing from 2.0 to 5.0 mA    Depth increased to +1.0  Transient paresthesias from 1.0 mA  limb remor suppression from 2.0mA  chin tremor suppression from 4.5 to 5.0mA    [id number]  no paresthesias to 5.0 mA  tremor reduction from 3 to 5mA  quasicycling mode:  capsule (tongue) at 5.0mA    DAYO: 3.5h  programmin.5h

## 2017-05-18 ENCOUNTER — TRANSFERRED RECORDS (OUTPATIENT)
Dept: HEALTH INFORMATION MANAGEMENT | Facility: CLINIC | Age: 62
End: 2017-05-18

## 2017-05-19 ENCOUNTER — TRANSFERRED RECORDS (OUTPATIENT)
Dept: HEALTH INFORMATION MANAGEMENT | Facility: CLINIC | Age: 62
End: 2017-05-19

## 2017-06-20 ENCOUNTER — TRANSFERRED RECORDS (OUTPATIENT)
Dept: HEALTH INFORMATION MANAGEMENT | Facility: CLINIC | Age: 62
End: 2017-06-20

## 2017-06-26 ENCOUNTER — TELEPHONE (OUTPATIENT)
Dept: NEUROLOGY | Facility: CLINIC | Age: 62
End: 2017-06-26

## 2017-07-18 ENCOUNTER — TELEPHONE (OUTPATIENT)
Dept: NEUROLOGY | Facility: CLINIC | Age: 62
End: 2017-07-18

## 2017-07-18 NOTE — TELEPHONE ENCOUNTER
"Spoke to Odin Humphreys was able to let them know to have patient place the device in \"surgical mode\" which the patient was able to do.  "

## 2017-07-18 NOTE — TELEPHONE ENCOUNTER
----- Message from Gail Love LPN sent at 7/18/2017  1:52 PM CDT -----  Regarding: question-pt waiting  Caller name: Yanira [facility] [address] [facility]  [phone]     Treating provider/specialty:    Nurse:    Best time to return call:    Message left?     Description of issue/question:  patient waiting-having EMG of right arm done now and needs to know if it is safe to do with DBS in place

## 2017-08-27 ASSESSMENT — ENCOUNTER SYMPTOMS
HEARTBURN: 1
ABDOMINAL PAIN: 0
ARTHRALGIAS: 0
TINGLING: 1
DIZZINESS: 1
BACK PAIN: 1
DIARRHEA: 0
MUSCLE WEAKNESS: 0
CONSTIPATION: 1
TREMORS: 1
CONSTIPATION: 1
ABDOMINAL PAIN: 0
WEAKNESS: 0
DISTURBANCES IN COORDINATION: 0
MUSCLE WEAKNESS: 0
DIARRHEA: 0
HEADACHES: 0
BLOATING: 0
LOSS OF CONSCIOUSNESS: 0
NECK PAIN: 0
JOINT SWELLING: 0
VOMITING: 0
MEMORY LOSS: 0
VOMITING: 0
HEARTBURN: 1
DIZZINESS: 1
RECTAL PAIN: 0
RECTAL PAIN: 0
TINGLING: 1
RECTAL BLEEDING: 0
STIFFNESS: 1
MEMORY LOSS: 0
SPEECH CHANGE: 0
MYALGIAS: 1
NUMBNESS: 1
SPEECH CHANGE: 0
LOSS OF CONSCIOUSNESS: 0
NUMBNESS: 1
SEIZURES: 0
NAUSEA: 0
PARALYSIS: 0
BLOOD IN STOOL: 0
STIFFNESS: 1
MUSCLE CRAMPS: 0
BACK PAIN: 1
BLOATING: 0
NAUSEA: 0
MYALGIAS: 1
NECK PAIN: 0
JAUNDICE: 0
BOWEL INCONTINENCE: 0
BLOOD IN STOOL: 0
BOWEL INCONTINENCE: 0
TREMORS: 1
RECTAL BLEEDING: 0
ARTHRALGIAS: 0
HEADACHES: 0
WEAKNESS: 0
DISTURBANCES IN COORDINATION: 0
MUSCLE CRAMPS: 0
JAUNDICE: 0
SEIZURES: 0
PARALYSIS: 0
JOINT SWELLING: 0

## 2017-08-29 NOTE — PROGRESS NOTES
Movement Disorders Clinic    HPI:  62 year old woman with essential tremor s/p VIM DBS in March 2017 for video taped assessment.     Current Outpatient Prescriptions   Medication Sig Dispense Refill     oxyCODONE (ROXICODONE) 5 MG IR tablet Take 1 tablet (5 mg) by mouth every 4 hours as needed for moderate to severe pain 30 tablet 0     acetaminophen (TYLENOL) 325 MG tablet Take 2 tablets (650 mg) by mouth every 4 hours as needed for other (surgical pain) 100 tablet      diphenhydrAMINE-acetaminophen (TYLENOL PM)  MG tablet Take 1 tablet by mouth as needed       calcium carbonate (OS-GISELA 600 MG [place]. [place]) 600 MG tablet Take 600 mg by mouth daily       Multiple vitamin TABS Take 1 tablet by mouth daily       primidone (MYSOLINE) 250 MG tablet 1 in am and 2 in the evening       albuterol (PROAIR HFA, PROVENTIL HFA, VENTOLIN HFA) 108 (90 BASE) MCG/ACT inhaler Inhale 2 puffs into the lungs every 6 hours       cyabnocobalamin (VITAMIN B-12) 2500 MCG sublingual tablet Place 2,500 mcg under the tongue daily       folic acid (FOLVITE) 400 MCG tablet Take 400 mcg by mouth daily       gabapentin (NEURONTIN) 600 MG tablet Take 600 mg by mouth 3 times daily          Allergies   Allergen Reactions     Asa [Aspirin] Unknown     Bee Venom Swelling     Penicillins      rash   14 Review of systems  are negative except for   Patient Active Problem List   Diagnosis     Essential tremor     Headache disorder     Other deformity of left finger(s)     Movement disorder     Cervico-occipital neuralgia     Osteoarthritis of knee     Dysphagia     Nuclear senile cataract     Sleep disorder     Family history of cardiomyopathy in mother     Cognitive disorder       Examination  B/P: Data Unavailable, T: Data Unavailable, P: Data Unavailable, R: Data Unavailable 0 lbs 0 oz  not currently breastfeeding., There is no height or weight on file to calculate BMI.    General examination: no apparent distress   Carotid: No bruits.   CV: Heart  regular rate and rhythm  Pulm: clear to ascultation bilaterally     Neuro exam:   Mental status:  Alert, oriented x3.  Speech fluent with normal naming, repetition, comprehension.  Good right-left orientation, Can remember ***/3 objects.    Cranial nerves:  Pupils are equal, round, reactive to light. Extraocular movements intact.  Facial sensation intact, facial strength intact.  Hearing intact to finger rub bi. Palate moves symmetrically.  Tongue midline.  Sternocleidomastoid and trapezius strength intact.    Motor: Tone: ***. Motor in upper and lower extremities. 5/5.    Sensation: intact to light touch and vibration in all extremities   Coordination: Good finger-nose-finger, fine finger movement, heel-shin maneuver,  Gait: normal except for ***. Romberg and postural stability *** Tremor present/absent  Reflexes: normoreflexic and symmetric throughout.    Plantars: downgoing bi    Tremor rating scale completed and to be scanned into EPIC.     Assessment/plan:       Answers for HPI/ROS submitted by the patient on 8/27/2017   General Symptoms: No  Skin Symptoms: No  HENT Symptoms: No  EYE SYMPTOMS: No  HEART SYMPTOMS: No  LUNG SYMPTOMS: No  INTESTINAL SYMPTOMS: Yes  URINARY SYMPTOMS: No  GYNECOLOGIC SYMPTOMS: No  BREAST SYMPTOMS: No  SKELETAL SYMPTOMS: Yes  BLOOD SYMPTOMS: No  NERVOUS SYSTEM SYMPTOMS: Yes  MENTAL HEALTH SYMPTOMS: No  Heart burn or indigestion: Yes  Nausea: No  Vomiting: No  Abdominal pain: No  Bloating: No  Constipation: Yes  Diarrhea: No  Blood in stool: No  Black stools: No  Rectal or Anal pain: No  Fecal incontinence: No  Rectal bleeding: No  Yellowing of skin or eyes: No  Vomit with blood: No  Change in stools: No  Hemorrhoids: No  Back pain: Yes  Muscle aches: Yes  Neck pain: No  Swollen joints: No  Joint pain: No  Bone pain: No  Muscle cramps: No  Muscle weakness: No  Joint stiffness: Yes  Bone fracture: No  Trouble with coordination: No  Dizziness or trouble with balance: Yes  Fainting or  black-out spells: No  Memory loss: No  Headache: No  Seizures: No  Speech problems: No  Tingling: Yes  Tremor: Yes  Weakness: No  Difficulty walking: No  Paralysis: No  Numbness: Yes

## 2017-08-30 ENCOUNTER — OFFICE VISIT (OUTPATIENT)
Dept: NEUROLOGY | Facility: CLINIC | Age: 62
End: 2017-08-30

## 2017-08-30 VITALS — HEIGHT: 67 IN | HEART RATE: 86 BPM | SYSTOLIC BLOOD PRESSURE: 129 MMHG | DIASTOLIC BLOOD PRESSURE: 75 MMHG

## 2017-08-30 DIAGNOSIS — G89.29 CHRONIC MIDLINE LOW BACK PAIN WITHOUT SCIATICA: Primary | ICD-10-CM

## 2017-08-30 DIAGNOSIS — M54.50 CHRONIC MIDLINE LOW BACK PAIN WITHOUT SCIATICA: Primary | ICD-10-CM

## 2017-08-30 DIAGNOSIS — G25.0 HEREDITARY ESSENTIAL TREMOR: Primary | ICD-10-CM

## 2017-08-30 RX ORDER — GABAPENTIN 600 MG/1
600 TABLET ORAL 3 TIMES DAILY
Qty: 270 TABLET | Refills: 3 | Status: SHIPPED | OUTPATIENT
Start: 2017-08-30 | End: 2018-06-28

## 2017-08-30 ASSESSMENT — PAIN SCALES - GENERAL: PAINLEVEL: NO PAIN (0)

## 2017-08-30 NOTE — PROGRESS NOTES
[facility]    HPI:  62 year old woman with essential tremor s/p VIM DBS in March 2017 with initial DBS programming in April 2017 for video taped assessment.     She was seen on 06/20/2017 by Dr. Hernandez for additional programming, at that time, her L VIM DBS amplitude was increased from 2.0 to 2.75 mA with improvement in tremor. They tried increasing the amplitude to 3.25 but this lead to transient finger tingling and mildly slurred speech.     Since that time, she reports       Past Surgical History:   Procedure Laterality Date     APPENDECTOMY       CHOLECYSTECTOMY       COLONOSCOPY       GASTRIC BYPASS       IMPLANT DEEP BRAIN STIMULATION GENERATOR / BATTERY Left 3/24/2017    Procedure: IMPLANT DEEP BRAIN STIMULATION GENERATOR / BATTERY;  Surgeon: Henok Javier MD;  Location: [facility] OR     OPTICAL TRACKING SYSTEM INSERTION DEEP BRAIN STIMULATION Left 3/14/2017    Procedure: OPTICAL TRACKING SYSTEM INSERTION DEEP BRAIN STIMULATION;  Surgeon: Henok Javier MD;  Location: [facility] OR     thoracic spine surgery       TUBAL LIGATION       Past Medical History:   Diagnosis Date     Chronic pain 11/8/2016     Cognitive disorder 12/4/2016    2016 evaluation   IMPRESSIONS AND RECOMMENDATIONS  Current results indicate overall intellectual functioning estimated fall in the mildly impaired range, marginally below premorbid estimates of low average based on single word reading abilities. She demonstrates a relative inefficiency in learning of new material, but retains information quite well once she has learned it. Visual-spatial abilities     Controlled type 2 diabetes mellitus without complication (H) 12/19/2008     Exercise-induced asthma      Gastroesophageal reflux disease 3/30/2015    XR ESOPHAGRAM7/8/2016  [facility]  Result Narrative  This document is currently in Final Status  Exam Accession# [id number]  XR ESOPHAGRAM  CLINICAL INFORMATION: Dysphagia;   COMPARISON: None.  FINDINGS: The  esophagus is normal in course and caliber in this post gastric bypass patient. No intrinsic or extrinsic mass lesions are identified. No strictures. There is a small hiatal hernia. Gastroesophageal reflux is observed during this examination  with numerous tertiary contractions and delayed esophageal clearance.  IMPRESSION:  1. Gastroesophageal reflux with numerous tertiary contractions and delayed esophageal clearance. 2. Sliding hiatal hernia in this post gastric bypass patient.  Dictated By: Dandre Wyatt MD 7/8/2016 9:47 AM Edited By: MIKE 7/8/2016 10:02 AM  Electronically Signed: Dandre Wyatt MD 7/8/2016 4:24 PM   Status Results Details         H/O bariatric surgery 11/08/2016     Headache disorder 11/05/2016     Heart murmur 11/8/2016     Rheumatic fever 11/8/2016     Tremor 11/5/2016     Current Outpatient Prescriptions   Medication Sig Dispense Refill     oxyCODONE (ROXICODONE) 5 MG IR tablet Take 1 tablet (5 mg) by mouth every 4 hours as needed for moderate to severe pain 30 tablet 0     acetaminophen (TYLENOL) 325 MG tablet Take 2 tablets (650 mg) by mouth every 4 hours as needed for other (surgical pain) 100 tablet      diphenhydrAMINE-acetaminophen (TYLENOL PM)  MG tablet Take 1 tablet by mouth as needed       calcium carbonate (OS-GISELA 600 MG [place]. [place]) 600 MG tablet Take 600 mg by mouth daily       Multiple vitamin TABS Take 1 tablet by mouth daily       primidone (MYSOLINE) 250 MG tablet 1 in am and 2 in the evening       albuterol (PROAIR HFA, PROVENTIL HFA, VENTOLIN HFA) 108 (90 BASE) MCG/ACT inhaler Inhale 2 puffs into the lungs every 6 hours       cyabnocobalamin (VITAMIN B-12) 2500 MCG sublingual tablet Place 2,500 mcg under the tongue daily       folic acid (FOLVITE) 400 MCG tablet Take 400 mcg by mouth daily       gabapentin (NEURONTIN) 600 MG tablet Take 600 mg by mouth 3 times daily          Allergies   Allergen Reactions     Asa [Aspirin] Unknown     Bee Venom Swelling     Penicillins       butch     Social History     Social History     Marital status:      Spouse name: N/A     Number of children: N/A     Years of education: N/A     Occupational History     Not on file.     Social History Main Topics     Smoking status: Never Smoker     Smokeless tobacco: Never Used     Alcohol use 0.0 oz/week     0 Standard drinks or equivalent per week      Comment: social     Drug use: Not on file     Sexual activity: Not on file     Other Topics Concern     Not on file     Social History Narrative    from [facility].  to Adilson MONTES HISTORY: The patient was born in [place], [place]. The patient was educated formally through 12 years. The patient is presently disabled due to her spinal difficulties. The patient has been  40 years. The patient has 3 children of that marriage. The patient does not use tobacco. The patient has social use of alcohol. The patient has no history of drug or intravenous drug use or abuse.         FAMILY HISTORY: A strong family history of tremor with mother having tremor, but also a strong paternal family history of tremor with father with multiple cousins. Patient does have one cousin with Parkinson disease. The patients father suffered with dementia of the Alzheimer type.     2 Daughters and son    Daughter lives 20 miles away in [place] from her her    Daughter lives in [place]    Son lives in [place]        Staying at the [facility]     Family History   Problem Relation Age of Onset     Tremor Mother      Cardiomyopathy Mother      had surgery at [facility]     Dementia Father      Alzheimer's disease     Parkinsonism Other      paternal aunt's son - cousin     Tremor Daughter      14 Review of systems  are negative except for   Patient Active Problem List   Diagnosis     Essential tremor     Headache disorder     Other deformity of left finger(s)     Movement disorder     Cervico-occipital neuralgia      "Osteoarthritis of knee     Dysphagia     Nuclear senile cataract     Sleep disorder     Family history of cardiomyopathy in mother     Cognitive disorder     Final setting  C+, *** -, *** volts, *** microseconds, *** Hz  Therapy and electrode impedances were checked and no issues were found the data will be scanned under the heading \"Neuromodulation\".        Examination  B/P: Data Unavailable, T: Data Unavailable, P: Data Unavailable, R: Data Unavailable 0 lbs 0 oz  not currently breastfeeding., There is no height or weight on file to calculate BMI.    General examination: no apparent distress   Carotid: No bruits.   CV: Heart regular rate and rhythm  Pulm: clear to ascultation bilaterally     Neuro exam:   Mental status:  Alert, oriented x3.  Speech fluent with normal naming, repetition, comprehension.  Good right-left orientation, Can remember ***/3 objects.    Cranial nerves:  Pupils are equal, round, reactive to light. Extraocular movements intact.  Facial sensation intact, facial strength intact.  Hearing intact to finger rub bi. Palate moves symmetrically.  Tongue midline.  Sternocleidomastoid and trapezius strength intact.    Motor: Tone: ***. Motor in upper and lower extremities. 5/5.    Sensation: intact to light touch and vibration in all extremities   Coordination: Good finger-nose-finger, fine finger movement, heel-shin maneuver,  Gait: normal except for ***. Romberg and postural stability *** Tremor present/absent  Reflexes: normoreflexic and symmetric throughout.    Plantars: downgoing bi    Tremor rating scale completed and to be scanned into EPIC.      Assessment/plan:       Answers for HPI/ROS submitted by the patient on 8/27/2017   General Symptoms: No  Skin Symptoms: No  HENT Symptoms: No  EYE SYMPTOMS: No  HEART SYMPTOMS: No  LUNG SYMPTOMS: No  INTESTINAL SYMPTOMS: Yes  URINARY SYMPTOMS: No  GYNECOLOGIC SYMPTOMS: No  BREAST SYMPTOMS: No  SKELETAL SYMPTOMS: Yes  BLOOD SYMPTOMS: No  NERVOUS SYSTEM " SYMPTOMS: Yes  MENTAL HEALTH SYMPTOMS: No  Heart burn or indigestion: Yes  Nausea: No  Vomiting: No  Abdominal pain: No  Bloating: No  Constipation: Yes  Diarrhea: No  Blood in stool: No  Black stools: No  Rectal or Anal pain: No  Fecal incontinence: No  Rectal bleeding: No  Yellowing of skin or eyes: No  Vomit with blood: No  Change in stools: No  Hemorrhoids: No  Back pain: Yes  Muscle aches: Yes  Neck pain: No  Swollen joints: No  Joint pain: No  Bone pain: No  Muscle cramps: No  Muscle weakness: No  Joint stiffness: Yes  Bone fracture: No  Trouble with coordination: No  Dizziness or trouble with balance: Yes  Fainting or black-out spells: No  Memory loss: No  Headache: No  Seizures: No  Speech problems: No  Tingling: Yes  Tremor: Yes  Weakness: No  Difficulty walking: No  Paralysis: No  Numbness: Yes

## 2017-08-30 NOTE — LETTER
8/30/2017       RE: Hilary Cornelius  [address]   [facility] [address]     Dear Colleague,    Thank you for referring your patient, Hilary Cornelius, to the [facility] NEUR[facility] at [facility]. Please see a copy of my visit note below.    [facility]    HPI:  62 year old woman with essential tremor s/p VIM DBS in March 2017 with initial DBS programming in April 2017 for video taped assessment for second side surgery.    Please see Dr. Flanagan clinic note from earlier today for HPI details (08/30/2017).     She has had excellent RUE tremor control s/p DBS. However, she still is very impaired in her activities such as eating, drinking, and personal hygiene with the left arm and as a result, would very much like to have the left side treated as well.     Current Outpatient Prescriptions   Medication Sig Dispense Refill     oxyCODONE (ROXICODONE) 5 MG IR tablet Take 1 tablet (5 mg) by mouth every 4 hours as needed for moderate to severe pain 30 tablet 0     acetaminophen (TYLENOL) 325 MG tablet Take 2 tablets (650 mg) by mouth every 4 hours as needed for other (surgical pain) 100 tablet      diphenhydrAMINE-acetaminophen (TYLENOL PM)  MG tablet Take 1 tablet by mouth as needed       calcium carbonate (OS-GISELA 600 MG [place]. [place]) 600 MG tablet Take 600 mg by mouth daily       Multiple vitamin TABS Take 1 tablet by mouth daily       primidone (MYSOLINE) 250 MG tablet 1 in am and 2 in the evening       albuterol (PROAIR HFA, PROVENTIL HFA, VENTOLIN HFA) 108 (90 BASE) MCG/ACT inhaler Inhale 2 puffs into the lungs every 6 hours       cyabnocobalamin (VITAMIN B-12) 2500 MCG sublingual tablet Place 2,500 mcg under the tongue daily       folic acid (FOLVITE) 400 MCG tablet Take 400 mcg by mouth daily       gabapentin (NEURONTIN) 600 MG tablet Take 600 mg by mouth 3 times daily          Allergies   Allergen Reactions     Asa [Aspirin] Unknown     Bee Venom Swelling      Penicillins      rash     Patient Active Problem List   Diagnosis     Essential tremor     Headache disorder     Other deformity of left finger(s)     Movement disorder     Cervico-occipital neuralgia     Osteoarthritis of knee     Dysphagia     Nuclear senile cataract     Sleep disorder     Family history of cardiomyopathy in mother     Cognitive disorder     Tremor Checklist  -----------------------  Handedness: Right handed  Speech: No quaver  Resting tremor:  <1cm rest tremor on left, none on right   Postural tremor: very slight on right with batwing position, 1-3 cm on left    Kinetic tremor: Head tremor: none  Finger-to-nose testing (FTN): Significant action and terminal intention tremor on left, mild action and terminal intention tremor on right  Sequential finger tapping (SFT): able to complete on right with minimal difficulty, much more difficult on left  Drinking from a cup: Cannot drink water from a cup even using both hands  Rapid alternating movements:intact bi   Toe tapping: Slow and slightly arrhythmic with left foot, much easier on right   Station and Gait: Not stooped, normal stride and pace, she does extend her right arm and fists her right hand with walking  Handwriting samples to be scanned into EPIC  Pouring water: spills about 25% of water, although mainly related to left arm tremor    Of note, with turning her off L VIM DBS, she had developed of high amplitude postural and action tremor on the right UE as well with significant worsening of handwriting and water pouring     Tremor rating scale completed and to be scanned into EPIC.  Tremor rating score with stimulation: 32  Tremor rating score without stimulation:54  % improvement with stimulation: 40%    Assessment/plan:   62 year old woman with essential tremor s/p VIM DBS in March 2017 with initial DBS programming in April 2017 for video taped assessment for second side surgery. She has had excellent right arm tremor suppression with DBS but  continues to have difficulty completing ADL's if she has to relay on using her left hand.    -video assessment completed  -we will discuss her case at DBS conference  -continue primidone for now    RTC in 6 months with Dr. Flanagan    .The case and recommendations were discussed with Dr. Flanagan, who has spoken with and examined the patient and helped to formulate the impression and recommendations.      Again, thank you for allowing me to participate in the care of your patient.      Sincerely,    Mackenzie Montano MD

## 2017-08-30 NOTE — PROGRESS NOTES
[facility]    HPI:  62 year old woman with essential tremor s/p VIM DBS in March 2017 with initial DBS programming in April 2017 for video taped assessment for second side surgery.    Please see Dr. Flanagan clinic note from earlier today for HPI details (08/30/2017).     She has had excellent RUE tremor control s/p DBS. However, she still is very impaired in her activities such as eating, drinking, and personal hygiene with the left arm and as a result, would very much like to have the left side treated as well.     Current Outpatient Prescriptions   Medication Sig Dispense Refill     oxyCODONE (ROXICODONE) 5 MG IR tablet Take 1 tablet (5 mg) by mouth every 4 hours as needed for moderate to severe pain 30 tablet 0     acetaminophen (TYLENOL) 325 MG tablet Take 2 tablets (650 mg) by mouth every 4 hours as needed for other (surgical pain) 100 tablet      diphenhydrAMINE-acetaminophen (TYLENOL PM)  MG tablet Take 1 tablet by mouth as needed       calcium carbonate (OS-GISELA 600 MG [place]. [place]) 600 MG tablet Take 600 mg by mouth daily       Multiple vitamin TABS Take 1 tablet by mouth daily       primidone (MYSOLINE) 250 MG tablet 1 in am and 2 in the evening       albuterol (PROAIR HFA, PROVENTIL HFA, VENTOLIN HFA) 108 (90 BASE) MCG/ACT inhaler Inhale 2 puffs into the lungs every 6 hours       cyabnocobalamin (VITAMIN B-12) 2500 MCG sublingual tablet Place 2,500 mcg under the tongue daily       folic acid (FOLVITE) 400 MCG tablet Take 400 mcg by mouth daily       gabapentin (NEURONTIN) 600 MG tablet Take 600 mg by mouth 3 times daily          Allergies   Allergen Reactions     Asa [Aspirin] Unknown     Bee Venom Swelling     Penicillins      rash     Patient Active Problem List   Diagnosis     Essential tremor     Headache disorder     Other deformity of left finger(s)     Movement disorder     Cervico-occipital neuralgia     Osteoarthritis of knee     Dysphagia     Nuclear senile cataract     Sleep  disorder     Family history of cardiomyopathy in mother     Cognitive disorder     Tremor Checklist  -----------------------  Handedness: Right handed  Speech: No quaver  Resting tremor:  <1cm rest tremor on left, none on right   Postural tremor: very slight on right with batwing position, 1-3 cm on left    Kinetic tremor: Head tremor: none  Finger-to-nose testing (FTN): Significant action and terminal intention tremor on left, mild action and terminal intention tremor on right  Sequential finger tapping (SFT): able to complete on right with minimal difficulty, much more difficult on left  Drinking from a cup: Cannot drink water from a cup even using both hands  Rapid alternating movements:intact bi   Toe tapping: Slow and slightly arrhythmic with left foot, much easier on right   Station and Gait: Not stooped, normal stride and pace, she does extend her right arm and fists her right hand with walking  Handwriting samples to be scanned into EPIC  Pouring water: spills about 25% of water, although mainly related to left arm tremor    Of note, with turning her off L VIM DBS, she had developed of high amplitude postural and action tremor on the right UE as well with significant worsening of handwriting and water pouring     Tremor rating scale completed and to be scanned into EPIC.  Tremor rating score with stimulation: 32  Tremor rating score without stimulation:54  % improvement with stimulation: 40%    Assessment/plan:   62 year old woman with essential tremor s/p VIM DBS in March 2017 with initial DBS programming in April 2017 for video taped assessment for second side surgery. She has had excellent right arm tremor suppression with DBS but continues to have difficulty completing ADL's if she has to relay on using her left hand.    -video assessment completed  -we will discuss her case at DBS conference  -continue primidone for now    RTC in 6 months with Dr. Flanagan    .The case and recommendations were  discussed with Dr. Flanagan, who has spoken with and examined the patient and helped to formulate the impression and recommendations.    Mackenzie Montano MD  Movement disorders fellow       Answers for HPI/ROS submitted by the patient on 8/27/2017   General Symptoms: No  Skin Symptoms: No  HENT Symptoms: No  EYE SYMPTOMS: No  HEART SYMPTOMS: No  LUNG SYMPTOMS: No  INTESTINAL SYMPTOMS: Yes  URINARY SYMPTOMS: No  GYNECOLOGIC SYMPTOMS: No  BREAST SYMPTOMS: No  SKELETAL SYMPTOMS: Yes  BLOOD SYMPTOMS: No  NERVOUS SYSTEM SYMPTOMS: Yes  MENTAL HEALTH SYMPTOMS: No  Heart burn or indigestion: Yes  Nausea: No  Vomiting: No  Abdominal pain: No  Bloating: No  Constipation: Yes  Diarrhea: No  Blood in stool: No  Black stools: No  Rectal or Anal pain: No  Fecal incontinence: No  Rectal bleeding: No  Yellowing of skin or eyes: No  Vomit with blood: No  Change in stools: No  Hemorrhoids: No  Back pain: Yes  Muscle aches: Yes  Neck pain: No  Swollen joints: No  Joint pain: No  Bone pain: No  Muscle cramps: No  Muscle weakness: No  Joint stiffness: Yes  Bone fracture: No  Trouble with coordination: No  Dizziness or trouble with balance: Yes  Fainting or black-out spells: No  Memory loss: No  Headache: No  Seizures: No  Speech problems: No  Tingling: Yes  Tremor: Yes  Weakness: No  Difficulty walking: No  Paralysis: No  Numbness: Yes    Answers for HPI/ROS submitted by the patient on 8/27/2017   General Symptoms: No  Skin Symptoms: No  HENT Symptoms: No  EYE SYMPTOMS: No  HEART SYMPTOMS: No  LUNG SYMPTOMS: No  INTESTINAL SYMPTOMS: Yes  URINARY SYMPTOMS: No  GYNECOLOGIC SYMPTOMS: No  BREAST SYMPTOMS: No  SKELETAL SYMPTOMS: Yes  BLOOD SYMPTOMS: No  NERVOUS SYSTEM SYMPTOMS: Yes  MENTAL HEALTH SYMPTOMS: No  Heart burn or indigestion: Yes  Nausea: No  Vomiting: No  Abdominal pain: No  Bloating: No  Constipation: Yes  Diarrhea: No  Blood in stool: No  Black stools: No  Rectal or Anal pain: No  Fecal incontinence: No  Rectal bleeding:  No  Yellowing of skin or eyes: No  Vomit with blood: No  Change in stools: No  Hemorrhoids: No  Back pain: Yes  Muscle aches: Yes  Neck pain: No  Swollen joints: No  Joint pain: No  Bone pain: No  Muscle cramps: No  Muscle weakness: No  Joint stiffness: Yes  Bone fracture: No  Trouble with coordination: No  Dizziness or trouble with balance: Yes  Fainting or black-out spells: No  Memory loss: No  Headache: No  Seizures: No  Speech problems: No  Tingling: Yes  Tremor: Yes  Weakness: No  Difficulty walking: No  Paralysis: No  Numbness: Yes

## 2017-08-30 NOTE — PATIENT INSTRUCTIONS
We have adjusted your DBS.    For the gabapentin, we will decrease the dose back to 600mg three times a day.    Days 1-3: Take 600mg in morning, 800mg midday, 800mg in the evening   Days 4-6: Take 600mg in morning, 600mg midday, 800mg in the evening    Days 7 and onward: Take 600mg three times a day

## 2017-08-30 NOTE — PROGRESS NOTES
"       PROCEDURE NOTE    PCP: Zachary Holguin  Referring Provider: Dr. Jus Ybarra  Patient: Hilary Cornelius  : 1955  DEO: 2017    ./75  Pulse 86  Ht 1.689 m (5' 6.5\")    Chief Complaint: tremors    HPI: The HPI is described in the following notes:  ------------------------------------------------------------------  1. Dr. Keith's [facility] progress Note dated 2016  2. Laura Bonilla's EPIC Progress Note dated 2016      3. Dr. Javier's H&P and EPIC Progress Note dated 11/10/2016  4. Ivnaa Martin's Candidacy Form discussed on 2016  5. My [facility] Progress Note dated 2017   6. Dr Hernandez's  notes dated 2017, 2017, and 2017     Additional information provided during today's clinic visit:   1. The patient reports her right arm tremor has been very well controlled since the most recent adjustments in .  Although she reports mild tremors sometimes at the end of the day she is very happy with the DBS reports.  She can hold a plate and a cup of coffee without spilling its contents, and she can use utensils without difficulty.   2. The patient has a severe LUE tremor and, therefore, she cannot use this arm for most ADL; she couldn't hold a plate or a cup in that hand without spilling thee contents.   3. The patient has not noticed slurred speech since her surgery, and she as noticed partial suppression of her yes-yes head tremor.   4. The patient continues to notice imbalance without falls, but she doesn't need/use an assist device for walking; she doesn't think her imbalance is worse since surgery.  5. The patient has noticed that her tremors do improve with ETOH, but she rarely drinks alcoholic beverages.   6. The patient has not made any adjustments in her DBS current, and she turns the IPG off at night.   7. She takes gabapentin 800mg tid for back pain; she was previously taking 600 mg tid, and she noted increased somnolence at the higher dose with " "no significant change in her back pain.     Tremor Checklist  -----------------------  Handedness: Right handed  Speech: No quaver  Resting tremor:  < 1 cm rest tremor on the left, none on right   Postural tremor: very slight in the RUE with batwing position, 1-3 cm on left    Kinetic tremor: Head tremor: none at rest but emerges when she attempts to drink a cup of water, and the cup approaches her mouth   Finger-to-nose testing (FTN): Significant action and terminal intention tremor on the left, mild action and terminal intention tremor on the right  Sequential finger tapping (SFT): Well performed with her right hand without contamination by an action tremor and with minimal difficulty; poor performance with the left hand -> slower, contaminated by an action tremor  Drinking from a cup: Cannot drink water from a cup using her left hand  Rapid alternating movements:intact bilaterally   Toe tapping: Arrhythmic with the left foot, well performed with the right foot   Station and Gait: Not stooped, normal stride and pace, extends her fisted right arm while walking    Handwriting samples to be scanned into EPIC  Pouring water: spills about 25% of the water, although this is mainly a consequnce of her LUE tremor    ROS: The patient has completed a [facility] Patient Health History including a 14-system review that can be viewed in EPIC.    Relevant Medications:  ------------------------------   Primidone 250 m tab in the morning, 2 tabs in the evening, last taken at 2017 around 11:30 pm   Gabapentin 800 m tab tid, last taken at 2017 around 11:30 pm     Allergies: Penicillin, bee stings, and Aspirin    PMH: See my progress note dated 2017.    PSH: See my progress note dated 2017.    FH:  See my progress note dated 2017.    SH: The patient drinks ~two alcoholic beverages/week, and this \"helps her tremor\".              PROGRAMMING LOG      Left VIM  -----------  Initial DBS " parameter settings: c+, 2A-, 2B- Amplitude-3.15 mA, 90 usec, Hz-130 Hz    Group 1  -----------  Left Vim initial settings: c+/2-, 2.00 mA, 60 usec, 130 Hz   Increase current to 2.00 mA, other parameter values unchanged -> return of right postural and head tremor  Increase current to 2.500 mA, other parameter values unchanged -> slight improvement in postural tremor   Increase current to 3.000 mA, other parameter values unchanged -> improvement in postural tremor   Increase current to 3.500 mA, other parameter values unchanged -> minimal tremor  Increase current to 4.000 mA, other parameter values unchanged -> slightly slurred speech  Decrease current to 3.00 mA-> best tremor control (postural and FTN)    Left Vim settings: c+/2A-, 1.00 mA, 60 usec, 130 Hz  Increase current to 1.500 mA, other parameter values unchanged -> tremor with batwing, FTN with action tremor, head tremor present    Increase current to 2.000 mA, other parameter values unchanged -> improved postural and action tremor   Increase current to 2.500 mA, other parameter values unchanged -> stable tremor, slight improvement with FTN     Left Vim settings: c+/2B-, 2.00 mA, 60 usec, 130 Hz  Increase current to 2.000 mA, other parameter values unchanged -> improvement in FTN and finger tapping  Increase current to 2.500 mA, other parameter values unchanged -> improvement in FTN   Increase current to 3.000 mA, other parameter values unchanged -> able to drink from cup without spilling  Increase current to 3.250 mA, other parameter values unchanged -> slightly better able to drink from cup (best tremor control)  Increased PW to 90 usec-> transient tingling in head, worsening of postural and action tremor     Left Vim settings: c+/2C-, 2.00 mA, 60 usec, 130 Hz  Increase current to 2.000 mA, other parameter values unchanged -> return of postural tremor   Increase current to 2.500 mA, other parameter values unchanged -> action and postural tremor present    Increase current to 3.000 mA, other parameter values unchanged -> worse postural tremor    Left Vim settings: c+/2B-, 2C-, 2.00 mA, 60 usec, 130 Hz  Increase current to 2.000 mA, other parameter values unchanged -> postural and action tremor present   Increase current to 2.500 mA, other parameter values unchanged ->  Improvement in action and postural tremor   Increase current to 3.000 mA, other parameter values unchanged -> slightly more tremor with drinking from cup    Left Vim final settings: c+/2B-, 3.0 mA, 60 usec, 130 Hz  Therapy Impedance:1162 Ohms  Current range provided: 2.5 to 3.0 mA     ------------------------------------------    Assessment  --------------------  1. 62-year-old woman with ET s/p LEFT VIM DBS with strong family history of ET, disabling tremors, marginal response to primidone, and significant imbalance.  She has had considerable tremor suppression after DBS surgery with further improvement noted today using a 2B directional segment, improving her ability to drink fluids from a cup.   2. Relatively low-current thresholds for sensory and motor side effects.  3. Significant RUE tremor suppression with LEFT Vim DBS.  4. Patient's EDS is likely due to high-dose gabapentin since more than 20% of adult patients taking high-dose gabapentin suffer from somnolence.    Plan  -------  1. RTC in 6 months for battery check and reprogramming if necessary.  2. To be followed by Dr. Hernandez in [place].   3. Down titrate gabapentin to 600mg tid to relieve EDS.   4. RTC in 6 months for battery check and reprogramming if necessary.    I spent 2 hours with the patient: 1.5 hours for performance of DBS target examinations and associated changes in DBS parameter values, and 0.5 hour devoted to a discussion of treatment options, use of the patient controller, and coordination of care with Ivana Martin.  Interrogating IPGs and modifying DBS parameter values using the ABBOTT DBS system requires significantly more  time than interrogating IPGs and modifying DBS parameter values using the [employer] DBS system.          JOANNA Flanagan MSc ([place]), MD  [facility]    cc: Josseline Hernandez MD          Answers for HPI/ROS submitted by the patient on 8/27/2017   General Symptoms: No  Skin Symptoms: No  HENT Symptoms: No  EYE SYMPTOMS: No  HEART SYMPTOMS: No  LUNG SYMPTOMS: No  INTESTINAL SYMPTOMS: Yes  URINARY SYMPTOMS: No  GYNECOLOGIC SYMPTOMS: No  BREAST SYMPTOMS: No  SKELETAL SYMPTOMS: Yes  BLOOD SYMPTOMS: No  NERVOUS SYSTEM SYMPTOMS: Yes  MENTAL HEALTH SYMPTOMS: No  Heart burn or indigestion: Yes  Nausea: No  Vomiting: No  Abdominal pain: No  Bloating: No  Constipation: Yes  Diarrhea: No  Blood in stool: No  Black stools: No  Rectal or Anal pain: No  Fecal incontinence: No  Rectal bleeding: No  Yellowing of skin or eyes: No  Vomit with blood: No  Change in stools: No  Hemorrhoids: No  Back pain: Yes  Muscle aches: Yes  Neck pain: No  Swollen joints: No  Joint pain: No  Bone pain: No  Muscle cramps: No  Muscle weakness: No  Joint stiffness: Yes  Bone fracture: No  Trouble with coordination: No  Dizziness or trouble with balance: Yes  Fainting or black-out spells: No  Memory loss: No  Headache: No  Seizures: No  Speech problems: No  Tingling: Yes  Tremor: Yes  Weakness: No  Difficulty walking: No  Paralysis: No  Numbness: Yes

## 2017-08-30 NOTE — MR AVS SNAPSHOT
After Visit Summary   8/30/2017    Hilary Cornelius    MRN: [id number]           Patient Information     Date Of Birth          1955        Visit Information        Provider Department      8/30/2017 1:00 PM Mackenzie Montano MD [facility]        Today's Diagnoses     Chronic midline low back pain without sciatica    -  1      Care Instructions    We have adjusted your DBS.    For the gabapentin, we will decrease the dose back to 600mg three times a day.    Days 1-3: Take 600mg in morning, 800mg midday, 800mg in the evening   Days 4-6: Take 600mg in morning, 600mg midday, 800mg in the evening    Days 7 and onward: Take 600mg three times a day           Follow-ups after your visit        Follow-up notes from your care team     Return in about 6 months (around 2/28/2018).      Your next 10 appointments already scheduled     Aug 30, 2017  1:00 PM CDT   (Arrive by 12:45 PM)   Return Movement Disorder with Mackenzie Montano MD   [facility] ([facility])    [address] [place] [id number] [phone]            Mar 28, 2018 10:00 AM CDT   (Arrive by 9:45 AM)   Return Movement Disorder with Randell Flanagan MD   [facility] ([facility])    [address] [place] [id number] [phone]              Who to contact     Please call your clinic at [phone] to:    Ask questions about your health    Make or cancel appointments    Discuss your medicines    Learn about your test results    Speak to your doctor   If you have compliments or concerns about an experience at your clinic, or if you wish to file a complaint, please contact [facility] Patient Relations at [phone] or email us at [email or web address]         Additional Information About Your Visit        MyChart Information     MyChart gives you secure access to your  electronic health record. If you see a primary care provider, you can also send messages to your care team and make appointments. If you have questions, please call your primary care clinic.  If you do not have a primary care provider, please call [phone] and they will assist you.      AKT is an electronic gateway that provides easy, online access to your medical records. With AKT, you can request a clinic appointment, read your test results, renew a prescription or communicate with your care team.     To access your existing account, please contact your [facility] or call [phone] for assistance.        Care EveryWhere ID     This is your Care EveryWhere ID. This could be used by other organizations to access your [place] medical records  [id number]         Blood Pressure from Last 3 Encounters:   08/30/17 129/75   04/19/17 133/80   03/24/17 135/89    Weight from Last 3 Encounters:   04/19/17 83.5 kg (184 lb)   03/24/17 83.9 kg (184 lb 15.5 oz)   03/15/17 85.1 kg (187 lb 9.8 oz)              Today, you had the following     No orders found for display         Today's Medication Changes          These changes are accurate as of: 8/30/17 12:57 PM.  If you have any questions, ask your nurse or doctor.               These medicines have changed or have updated prescriptions.        Dose/Directions    * gabapentin 600 MG tablet   Commonly known as:  NEURONTIN   This may have changed:  Another medication with the same name was added. Make sure you understand how and when to take each.   Changed by:  Zeke Keith MD        Dose:  600 mg   Take 600 mg by mouth 3 times daily   Refills:  0       * gabapentin 600 MG tablet   Commonly known as:  NEURONTIN   This may have changed:  You were already taking a medication with the same name, and this prescription was added. Make sure you understand how and when to take each.   Used for:  Chronic midline low back pain without  sciatica   Changed by:  Mackenzie Montano MD        Dose:  600 mg   Take 1 tablet (600 mg) by mouth 3 times daily   Quantity:  270 tablet   Refills:  3       * Notice:  This list has 2 medication(s) that are the same as other medications prescribed for you. Read the directions carefully, and ask your doctor or other care provider to review them with you.         Where to get your medicines      These medications were sent to [facility] [address] [facility] [address] [facility] [address] [place] [address] [place] [address]     Phone:  [phone]     gabapentin 600 MG tablet                Primary Care Provider Office Phone # Fax #    Zachary Holguin [phone]       [facility] [address]  [place] [address]        Equal Access to Services     JANIS TABARES : Jimi Forbes, waaxdanwa carcamoqadaha, qaybta kaalmada arin, ze brown . So [facility] [phone].    ATENCIÓN: Si habla español, tiene a paz disposición servicios gratuitos de asistencia lingüística. [facility] [phone].    We comply with applicable federal civil rights laws and [place] laws. We do not discriminate on the basis of race, color, national origin, age, disability sex, sexual orientation or gender identity.            Thank you!     Thank you for choosing [facility]  for your care. Our goal is always to provide you with excellent care. Hearing back from our patients is one way we can continue to improve our services. Please take a few minutes to complete the written survey that you may receive in the mail after your visit with us. Thank you!             Your Updated Medication List - Protect others around you: Learn how to safely use, store and throw away your medicines at www.disposemymeds.org.          This list is accurate as of: 8/30/17 12:57 PM.  Always use your most recent med list.                   Brand Name Dispense Instructions for use Diagnosis     acetaminophen 325 MG tablet    TYLENOL    100 tablet    Take 2 tablets (650 mg) by mouth every 4 hours as needed for other (surgical pain)    Essential tremor       albuterol 108 (90 BASE) MCG/ACT Inhaler    PROAIR HFA/PROVENTIL HFA/VENTOLIN HFA     Inhale 2 puffs into the lungs every 6 hours        calcium carbonate 600 MG tablet    OS-GISELA 600 mg [place]. Ca     Take 600 mg by mouth daily        cyabnocobalamin 2500 MCG sublingual tablet    VITAMIN B-12     Place 2,500 mcg under the tongue daily        diphenhydrAMINE-acetaminophen  MG tablet    TYLENOL PM     Take 1 tablet by mouth as needed        folic acid 400 MCG tablet    FOLVITE     Take 400 mcg by mouth daily        * gabapentin 600 MG tablet    NEURONTIN     Take 600 mg by mouth 3 times daily        * gabapentin 600 MG tablet    NEURONTIN    270 tablet    Take 1 tablet (600 mg) by mouth 3 times daily    Chronic midline low back pain without sciatica       Multiple vitamin Tabs      Take 1 tablet by mouth daily        oxyCODONE 5 MG IR tablet    ROXICODONE    30 tablet    Take 1 tablet (5 mg) by mouth every 4 hours as needed for moderate to severe pain    Essential tremor       primidone 250 MG tablet    MYSOLINE     1 in am and 2 in the evening        * Notice:  This list has 2 medication(s) that are the same as other medications prescribed for you. Read the directions carefully, and ask your doctor or other care provider to review them with you.

## 2017-08-30 NOTE — PROGRESS NOTES
Answers for HPI/ROS submitted by the patient on 8/27/2017   General Symptoms: No  Skin Symptoms: No  HENT Symptoms: No  EYE SYMPTOMS: No  HEART SYMPTOMS: No  LUNG SYMPTOMS: No  INTESTINAL SYMPTOMS: Yes  URINARY SYMPTOMS: No  GYNECOLOGIC SYMPTOMS: No  BREAST SYMPTOMS: No  SKELETAL SYMPTOMS: Yes  BLOOD SYMPTOMS: No  NERVOUS SYSTEM SYMPTOMS: Yes  MENTAL HEALTH SYMPTOMS: No  Heart burn or indigestion: Yes  Nausea: No  Vomiting: No  Abdominal pain: No  Bloating: No  Constipation: Yes  Diarrhea: No  Blood in stool: No  Black stools: No  Rectal or Anal pain: No  Fecal incontinence: No  Rectal bleeding: No  Yellowing of skin or eyes: No  Vomit with blood: No  Change in stools: No  Hemorrhoids: No  Back pain: Yes  Muscle aches: Yes  Neck pain: No  Swollen joints: No  Joint pain: No  Bone pain: No  Muscle cramps: No  Muscle weakness: No  Joint stiffness: Yes  Bone fracture: No  Trouble with coordination: No  Dizziness or trouble with balance: Yes  Fainting or black-out spells: No  Memory loss: No  Headache: No  Seizures: No  Speech problems: No  Tingling: Yes  Tremor: Yes  Weakness: No  Difficulty walking: No  Paralysis: No  Numbness: Yes

## 2017-08-30 NOTE — LETTER
"2017       RE: Hilary Cornelius  PO   [facility] [address]     Dear Colleague,    Thank you for referring your patient, Hilary Cornelius, to the [facility] NEUROLOGY at [facility]. Please see a copy of my visit note below.           PROCEDURE NOTE    PCP: Zachary Holguin  Referring Provider: Dr. Jus Ybarra  Patient: Hilary Cornelius  : 1955  DEO: 2017    ./75  Pulse 86  Ht 1.689 m (5' 6.5\")    Chief Complaint: tremors    HPI: The HPI is described in the following notes:  ------------------------------------------------------------------  1. Dr. Keith's [facility] progress Note dated 2016  2. Laura Bonilla's EPIC Progress Note dated 2016      3. Dr. Javier's H&P and [facility] Progress Note dated 11/10/2016  4. Ivana Martin's Candidacy Form discussed on 2016  5. My [facility] Progress Note dated 2017   6. Dr Hernandez's [facility] notes dated 2017, 2017, and 2017     Additional information provided during today's clinic visit:   1. The patient reports her right arm tremor has been very well controlled since the most recent adjustments in .  Although she reports mild tremors sometimes at the end of the day she is very happy with the DBS reports.  She can hold a plate and a cup of coffee without spilling its contents, and she can use utensils without difficulty.   2. The patient has a severe LUE tremor and, therefore, she cannot use this arm for most ADL; she couldn't hold a plate or a cup in that hand without spilling thee contents.   3. The patient has not noticed slurred speech since her surgery, and she as noticed partial suppression of her yes-yes head tremor.   4. The patient continues to notice imbalance without falls, but she doesn't need/use an assist device for walking; she doesn't think her imbalance is worse since surgery.  5. The patient has noticed that her tremors do improve with ETOH, but she rarely " drinks alcoholic beverages.   6. The patient has not made any adjustments in her DBS current, and she turns the IPG off at night.   7. She takes gabapentin 800mg tid for back pain; she was previously taking 600 mg tid, and she noted increased somnolence at the higher dose with no significant change in her back pain.     Tremor Checklist  -----------------------  Handedness: Right handed  Speech: No quaver  Resting tremor:  < 1 cm rest tremor on the left, none on right   Postural tremor: very slight in the RUE with batwing position, 1-3 cm on left    Kinetic tremor: Head tremor: none at rest but emerges when she attempts to drink a cup of water, and the cup approaches her mouth   Finger-to-nose testing (FTN): Significant action and terminal intention tremor on the left, mild action and terminal intention tremor on the right  Sequential finger tapping (SFT): Well performed with her right hand without contamination by an action tremor and with minimal difficulty; poor performance with the left hand -> slower, contaminated by an action tremor  Drinking from a cup: Cannot drink water from a cup using her left hand  Rapid alternating movements:intact bilaterally   Toe tapping: Arrhythmic with the left foot, well performed with the right foot   Station and Gait: Not stooped, normal stride and pace, extends her fisted right arm while walking    Handwriting samples to be scanned into EPIC  Pouring water: spills about 25% of the water, although this is mainly a consequnce of her LUE tremor    ROS: The patient has completed a [facility] Patient Health History including a 14-system review that can be viewed in EPIC.    Relevant Medications:  ------------------------------   Primidone 250 m tab in the morning, 2 tabs in the evening, last taken at 2017 around 11:30 pm   Gabapentin 800 m tab tid, last taken at 2017 around 11:30 pm     Allergies: Penicillin, bee stings, and Aspirin    PMH: See my  "progress note dated 04/19/2017.    PSH: See my progress note dated 04/19/2017.    FH:  See my progress note dated 04/19/2017.    SH: The patient drinks ~two alcoholic beverages/week, and this \"helps her tremor\".              PROGRAMMING LOG    Left VIM  -----------  Initial DBS parameter settings: c+, 2A-, 2B- Amplitude-3.15 mA, 90 usec, Hz-130 Hz    Group 1  -----------  Left Vim initial settings: c+/2-, 2.00 mA, 60 usec, 130 Hz   Increase current to 2.00 mA, other parameter values unchanged -> return of right postural and head tremor  Increase current to 2.500 mA, other parameter values unchanged -> slight improvement in postural tremor   Increase current to 3.000 mA, other parameter values unchanged -> improvement in postural tremor   Increase current to 3.500 mA, other parameter values unchanged -> minimal tremor  Increase current to 4.000 mA, other parameter values unchanged -> slightly slurred speech  Decrease current to 3.00 mA-> best tremor control (postural and FTN)    Left Vim settings: c+/2A-, 1.00 mA, 60 usec, 130 Hz  Increase current to 1.500 mA, other parameter values unchanged -> tremor with batwing, FTN with action tremor, head tremor present    Increase current to 2.000 mA, other parameter values unchanged -> improved postural and action tremor   Increase current to 2.500 mA, other parameter values unchanged -> stable tremor, slight improvement with FTN     Left Vim settings: c+/2B-, 2.00 mA, 60 usec, 130 Hz  Increase current to 2.000 mA, other parameter values unchanged -> improvement in FTN and finger tapping  Increase current to 2.500 mA, other parameter values unchanged -> improvement in FTN   Increase current to 3.000 mA, other parameter values unchanged -> able to drink from cup without spilling  Increase current to 3.250 mA, other parameter values unchanged -> slightly better able to drink from cup (best tremor control)  Increased PW to 90 usec-> transient tingling in head, worsening of " postural and action tremor     Left Vim settings: c+/2C-, 2.00 mA, 60 usec, 130 Hz  Increase current to 2.000 mA, other parameter values unchanged -> return of postural tremor   Increase current to 2.500 mA, other parameter values unchanged -> action and postural tremor present   Increase current to 3.000 mA, other parameter values unchanged -> worse postural tremor    Left Vim settings: c+/2B-, 2C-, 2.00 mA, 60 usec, 130 Hz  Increase current to 2.000 mA, other parameter values unchanged -> postural and action tremor present   Increase current to 2.500 mA, other parameter values unchanged ->  Improvement in action and postural tremor   Increase current to 3.000 mA, other parameter values unchanged -> slightly more tremor with drinking from cup    Left Vim final settings: c+/2B-, 3.0 mA, 60 usec, 130 Hz  Therapy Impedance:1162 Ohms  Current range provided: 2.5 to 3.0 mA     ------------------------------------------  Assessment  --------------------  1. 62-year-old woman with ET s/p LEFT VIM DBS with strong family history of ET, disabling tremors, marginal response to primidone, and significant imbalance.  She has had considerable tremor suppression after DBS surgery with further improvement noted today using a 2B directional segment, improving her ability to drink fluids from a cup.   2. Relatively low-current thresholds for sensory and motor side effects.  3. Significant RUE tremor suppression with LEFT Vim DBS.  4. Patient's EDS is likely due to high-dose gabapentin since more than 20% of adult patients taking high-dose gabapentin suffer from somnolence.    Plan  -------  1. RTC in 6 months for battery check and reprogramming if necessary.  2. To be followed by Dr. Hernandez in [place].   3. Down titrate gabapentin to 600mg tid to relieve EDS.   4. RTC in 6 months for battery check and reprogramming if necessary.    I spent 2 hours with the patient: 1.5 hours for performance of DBS target examinations and associated  changes in DBS parameter values, and 0.5 hour devoted to a discussion of treatment options, use of the patient controller, and coordination of care with Ivana Martin.  Interrogating IPGs and modifying DBS parameter values using the ABBOTT DBS system requires significantly more time than interrogating IPGs and modifying DBS parameter values using the [employer] DBS system.          cc: Josseline Hernandez MD    Again, thank you for allowing me to participate in the care of your patient.    Randell Flanagan MD

## 2017-08-30 NOTE — MR AVS SNAPSHOT
After Visit Summary   8/30/2017    Hilary Cornelius    MRN: [id number]           Patient Information     Date Of Birth          1955        Visit Information        Provider Department      8/30/2017 10:00 AM Randell Flanagan MD [facility] Neurology        Today's Diagnoses     Hereditary essential tremor    -  1       Follow-ups after your visit        Follow-up notes from your care team     Return in about 6 months (around 2/28/2018), or if symptoms worsen or fail to improve, for recheck.      Your next 10 appointments already scheduled     Mar 28, 2018 10:00 AM CDT   (Arrive by 9:45 AM)   Return Movement Disorder with Randell Flanagan MD   [facility] Neurology ([facility] [place])    [address] [address] [phone]              Who to contact     Please call your clinic at [phone] to:    Ask questions about your health    Make or cancel appointments    Discuss your medicines    Learn about your test results    Speak to your doctor   If you have compliments or concerns about an experience at your clinic, or if you wish to file a complaint, please contact [facility] Physicians Patient Relations at [phone] or email us at [email or web address]         Additional Information About Your Visit        MyChart Information     [employer] gives you secure access to your electronic health record. If you see a primary care provider, you can also send messages to your care team and make appointments. If you have questions, please call your primary care clinic.  If you do not have a primary care provider, please call [phone] and they will assist you.      [employer] is an electronic gateway that provides easy, online access to your medical records. With [employer], you can request a clinic appointment, read your test results, renew a prescription or communicate with your care team.     To access your  "existing account, please contact your [facility] or call [phone] for assistance.        Care EveryWhere ID     This is your Care EveryWhere ID. This could be used by other organizations to access your [facility] medical records  [id number]        Your Vitals Were     Pulse Height                86 1.689 m (5' 6.5\")           Blood Pressure from Last 3 Encounters:   08/30/17 129/75   04/19/17 133/80   03/24/17 135/89    Weight from Last 3 Encounters:   04/19/17 83.5 kg (184 lb)   03/24/17 83.9 kg (184 lb 15.5 oz)   03/15/17 85.1 kg (187 lb 9.8 oz)              Today, you had the following     No orders found for display         Today's Medication Changes          These changes are accurate as of: 8/30/17  5:54 PM.  If you have any questions, ask your nurse or doctor.               These medicines have changed or have updated prescriptions.        Dose/Directions    * gabapentin 600 MG tablet   Commonly known as:  NEURONTIN   This may have changed:  Another medication with the same name was added. Make sure you understand how and when to take each.   Changed by:  Zeke Keith MD        Dose:  600 mg   Take 600 mg by mouth 3 times daily   Refills:  0       * gabapentin 600 MG tablet   Commonly known as:  NEURONTIN   This may have changed:  You were already taking a medication with the same name, and this prescription was added. Make sure you understand how and when to take each.   Used for:  Chronic midline low back pain without sciatica   Changed by:  Mackenzie Montano MD        Dose:  600 mg   Take 1 tablet (600 mg) by mouth 3 times daily   Quantity:  270 tablet   Refills:  3       * Notice:  This list has 2 medication(s) that are the same as other medications prescribed for you. Read the directions carefully, and ask your doctor or other care provider to review them with you.         Where to get your medicines      These medications were sent to [facility] - " [place], [place] - [address]  [address] [facility] [address], [place] [address]     Phone:  [phone]     gabapentin 600 MG tablet                Primary Care Provider Office Phone # Fax #    Zachary Holguin [phone]       [facility] [address]  [place] [address]        Equal Access to Services     JANIS TABARES : Hadii aad ku hadasho Soomaali, waaxda luqadaha, qaybta kaalmada adeegyada, waxlauren melissain haypingn adedomi loweraymundoson brown . So [facility] [phone].    ATENCIÓN: Si jayden dorman, tiene a paz disposición servicios gratuitos de asistencia lingüística. Abel al [phone].    We comply with applicable federal civil rights laws and [place] laws. We do not discriminate on the basis of race, color, national origin, age, disability sex, sexual orientation or gender identity.            Thank you!     Thank you for choosing [facility] NEUROLOGY  for your care. Our goal is always to provide you with excellent care. Hearing back from our patients is one way we can continue to improve our services. Please take a few minutes to complete the written survey that you may receive in the mail after your visit with us. Thank you!             Your Updated Medication List - Protect others around you: Learn how to safely use, store and throw away your medicines at www.disposemymeds.org.          This list is accurate as of: 8/30/17  5:54 PM.  Always use your most recent med list.                   Brand Name Dispense Instructions for use Diagnosis    acetaminophen 325 MG tablet    TYLENOL    100 tablet    Take 2 tablets (650 mg) by mouth every 4 hours as needed for other (surgical pain)    Essential tremor       albuterol 108 (90 BASE) MCG/ACT Inhaler    PROAIR HFA/PROVENTIL HFA/VENTOLIN HFA     Inhale 2 puffs into the lungs every 6 hours        calcium carbonate 600 MG tablet    OS-GISELA 600 mg [place]. Ca     Take 600 mg by mouth daily        cyabnocobalamin 2500 MCG sublingual tablet    VITAMIN  B-12     Place 2,500 mcg under the tongue daily        diphenhydrAMINE-acetaminophen  MG tablet    TYLENOL PM     Take 1 tablet by mouth as needed        folic acid 400 MCG tablet    FOLVITE     Take 400 mcg by mouth daily        * gabapentin 600 MG tablet    NEURONTIN     Take 600 mg by mouth 3 times daily        * gabapentin 600 MG tablet    NEURONTIN    270 tablet    Take 1 tablet (600 mg) by mouth 3 times daily    Chronic midline low back pain without sciatica       Multiple vitamin Tabs      Take 1 tablet by mouth daily        oxyCODONE 5 MG IR tablet    ROXICODONE    30 tablet    Take 1 tablet (5 mg) by mouth every 4 hours as needed for moderate to severe pain    Essential tremor       primidone 250 MG tablet    MYSOLINE     1 in am and 2 in the evening        * Notice:  This list has 2 medication(s) that are the same as other medications prescribed for you. Read the directions carefully, and ask your doctor or other care provider to review them with you.

## 2017-09-07 DIAGNOSIS — R25.1 TREMOR: Primary | ICD-10-CM

## 2017-10-04 ENCOUNTER — TELEPHONE (OUTPATIENT)
Dept: NEUROLOGY | Facility: CLINIC | Age: 62
End: 2017-10-04

## 2017-10-04 NOTE — TELEPHONE ENCOUNTER
From 17 DEEP BRAIN STIMULATION Consensus meetin. Repeat neuropsychology evaluation because baseline showed some mental disability   2. Rediscuss   3. Abbott RVIM if cleared    Patient's last neuropsych was 2016.   -------------------------------------------  Called patient back again to discuss above. Explained that we need another neuropsych evaluation and that a  would call her to get that scheduled. She would like to be called on her cell later today if possible [phone].

## 2017-11-06 ENCOUNTER — TRANSFERRED RECORDS (OUTPATIENT)
Dept: HEALTH INFORMATION MANAGEMENT | Facility: CLINIC | Age: 62
End: 2017-11-06

## 2017-11-28 ENCOUNTER — OFFICE VISIT (OUTPATIENT)
Dept: NEUROPSYCHOLOGY | Facility: CLINIC | Age: 62
End: 2017-11-28

## 2017-11-28 DIAGNOSIS — F09 MENTAL DISORDER DUE TO GENERAL MEDICAL CONDITION: ICD-10-CM

## 2017-11-28 DIAGNOSIS — G25.0 ESSENTIAL TREMOR: Primary | ICD-10-CM

## 2017-11-28 NOTE — MR AVS SNAPSHOT
After Visit Summary   11/28/2017    Hilary Cornelius    MRN: [id number]           Patient Information     Date Of Birth          1955        Visit Information        Provider Department      11/28/2017 8:00 AM Judith Ayala, PhD [facility] Neuropsychology        Today's Diagnoses     Essential tremor    -  1    Mental disorder due to general medical condition           Follow-ups after your visit        Your next 10 appointments already scheduled     Mar 28, 2018 10:00 AM CDT   (Arrive by 9:45 AM)   Return Movement Disorder with Randell Flanagan MD   [facility] Neurology ([facility] [place])    [address] [place] [address] [phone]              Future tests that were ordered for you today     Open Future Orders        Priority Expected Expires Ordered    CT Head w/o Contrast Routine  12/13/2018 12/13/2017            Who to contact     Please call your clinic at [phone] to:    Ask questions about your health    Make or cancel appointments    Discuss your medicines    Learn about your test results    Speak to your doctor   If you have compliments or concerns about an experience at your clinic, or if you wish to file a complaint, please contact [facility] Patient Relations at [phone] or email us at [email or web address]         Additional Information About Your Visit        MyChart Information     [employer] gives you secure access to your electronic health record. If you see a primary care provider, you can also send messages to your care team and make appointments. If you have questions, please call your primary care clinic.  If you do not have a primary care provider, please call [phone] and they will assist you.      [employer] is an electronic gateway that provides easy, online access to your medical records. With [employer], you can request a clinic appointment, read your test  results, renew a prescription or communicate with your care team.     To access your existing account, please contact your [facility] Clinic or call [phone] for assistance.        Care EveryWhere ID     This is your Care EveryWhere ID. This could be used by other organizations to access your [place] medical records  [id number]         Blood Pressure from Last 3 Encounters:   08/30/17 129/75   04/19/17 133/80   03/24/17 135/89    Weight from Last 3 Encounters:   04/19/17 83.5 kg (184 lb)   03/24/17 83.9 kg (184 lb 15.5 oz)   03/15/17 85.1 kg (187 lb 9.8 oz)              We Performed the Following     [id number] TESTING, PER HR/PSYCHOLOGIST     NEUROPSYCH TESTING BY [facility]        Primary Care Provider Office Phone # Fax #    Zachary Holguin [phone]       [facility] [address]  [facility] [facility] [address]        Equal Access to Services     JANIS TABARES : Hadii aad ku hadasho Soomaali, waaxda luqadaha, qaybta kaalmada adeegyada, waxay idiin haypingn pricilla brown . So [facility] [phone].    ATENCIÓN: Si habla español, tiene a paz disposición servicios gratuitos de asistencia lingüística. Llame al [phone].    We comply with applicable federal civil rights laws and [place] laws. We do not discriminate on the basis of race, color, national origin, age, disability, sex, sexual orientation, or gender identity.            Thank you!     Thank you for choosing [facility] NEUROPSYCHOLOGY  for your care. Our goal is always to provide you with excellent care. Hearing back from our patients is one way we can continue to improve our services. Please take a few minutes to complete the written survey that you may receive in the mail after your visit with us. Thank you!             Your Updated Medication List - Protect others around you: Learn how to safely use, store and throw away your medicines at www.disposemymeds.org.          This list is accurate as of:  11/28/17 11:59 PM.  Always use your most recent med list.                   Brand Name Dispense Instructions for use Diagnosis    acetaminophen 325 MG tablet    TYLENOL    100 tablet    Take 2 tablets (650 mg) by mouth every 4 hours as needed for other (surgical pain)    Essential tremor       albuterol 108 (90 BASE) MCG/ACT Inhaler    PROAIR HFA/PROVENTIL HFA/VENTOLIN HFA     Inhale 2 puffs into the lungs every 6 hours        calcium carbonate 600 MG tablet    OS-GISELA 600 mg [place]. Ca     Take 600 mg by mouth daily        cyabnocobalamin 2500 MCG sublingual tablet    VITAMIN B-12     Place 2,500 mcg under the tongue daily        diphenhydrAMINE-acetaminophen  MG tablet    TYLENOL PM     Take 1 tablet by mouth as needed        folic acid 400 MCG tablet    FOLVITE     Take 400 mcg by mouth daily        * gabapentin 600 MG tablet    NEURONTIN     Take 600 mg by mouth 3 times daily        * gabapentin 600 MG tablet    NEURONTIN    270 tablet    Take 1 tablet (600 mg) by mouth 3 times daily    Chronic midline low back pain without sciatica       Multiple vitamin Tabs      Take 1 tablet by mouth daily        oxyCODONE IR 5 MG tablet    ROXICODONE    30 tablet    Take 1 tablet (5 mg) by mouth every 4 hours as needed for moderate to severe pain    Essential tremor       primidone 250 MG tablet    MYSOLINE     1 in am and 2 in the evening        * Notice:  This list has 2 medication(s) that are the same as other medications prescribed for you. Read the directions carefully, and ask your doctor or other care provider to review them with you.

## 2017-11-28 NOTE — PROGRESS NOTES
The patient was seen for neuropsychological evaluation at the request of Mackenzie Montano MD for the purpose of diagnostic clarification and treatment planning.  2.5 hours of face to face testing were provided by this writer.  Please see Dr. Judith Ayala's report for a full interpretation of the findings.    Parvin Goff  Psychometrist

## 2017-12-01 ENCOUNTER — TELEPHONE (OUTPATIENT)
Dept: NEUROLOGY | Facility: CLINIC | Age: 62
End: 2017-12-01

## 2017-12-01 NOTE — TELEPHONE ENCOUNTER
I attempted to call MsCarmen Ladonna let her know the results of our DBS consensus meeting. Unfortunately, I received her voicemail. I did leave a message telling her to call back the neurology clinic at her earliest convenience.

## 2017-12-05 DIAGNOSIS — G25.0 ESSENTIAL TREMOR: Primary | ICD-10-CM

## 2017-12-05 DIAGNOSIS — Z96.89 S/P DEEP BRAIN STIMULATOR PLACEMENT: ICD-10-CM

## 2017-12-05 DIAGNOSIS — Z01.818 PREOPERATIVE EVALUATION TO RULE OUT SURGICAL CONTRAINDICATION: ICD-10-CM

## 2017-12-06 ENCOUNTER — TELEPHONE (OUTPATIENT)
Dept: NEUROLOGY | Facility: CLINIC | Age: 62
End: 2017-12-06

## 2017-12-06 NOTE — TELEPHONE ENCOUNTER
I attempted to call Ms. Cornelius regarding her DBS consensus results. I did not reach her but did leave a voicemail instructing her to call back the clinic at her earliest convenience.

## 2017-12-13 DIAGNOSIS — G25.0 ESSENTIAL TREMOR: Primary | ICD-10-CM

## 2017-12-14 NOTE — PROGRESS NOTES
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 Information was obtained via interview with the patient and review of her medical records, including a prior neuropsychological evaluation. Ms. Cornelius has a history of essential tremor since childhood, and underwent left side VIM DBS lead placement in March 2017. She has had excellent right upper extremity control since then, but remains impaired in her activities including eating, drinking, and personal hygiene with her left arm, and is now interested in undergoing second side surgery. Since her last neuropsychological evaluation was over one year ago, she was referred for this neuropsychological evaluation by Henok Javier M.D., as part of the presurgical protocol, to assess cognitive functioning and mood, as they pertain to her surgical candidacy.     Ms. Cornelius underwent a baseline neuropsychological evaluation on 11/10/2016. Please refer to the report from that date for full details regarding her history and the findings of the evaluation. Briefly, results indicated overall intellectual functioning estimated to fall in the mildly impaired range, marginally below premorbid estimates of low average based on single word reading abilities. She demonstrated a relative inefficiency in learning of new material, but retained information quite well one she had learned it. Visuospatial abilities reflect a weakness for her. Performance across other cognitive domains otherwise was generally consistent with her level of intellectual functioning, and she had a strength in executive functioning. She denied significant depressive  symptomatology.    CLINICAL INTERVIEW FINDINGS    Upon interview for this evaluation, Ms. Cornelius stated that the surgery went very well for her. It took a while for her to get her energy back after the surgery, but she had good benefit. She can now drink a glass of water with her right hand without difficulty, and she is very satisfied with the outcome. She hopes that the left side surgery will have a similar effect to the right side surgery. She feels lopsided, and her balance is off. She has not been falling but she tends to run into things. She has a good understanding of the surgical procedure. She stated that she did fine with being awake during the first surgery, although the drill sound was  spooky.  She stated that she knows what to expect. She listed bleeding, seizures, and stroke as possible risks of the surgery, but stated that she is not worried about these risks and feels confident about proceeding. She stated that her  is supportive of her having the second side surgery as well.    Ms. Cornelius stated that she has had long-standing difficulty with memory that does not seem any worse than normal. For instance, someone can tell her joke and she will forget it right away. She has not become lost in familiar places, nor has she been misplacing items any more than normal. She denied difficulty with word finding, stating that people have been telling her that her speech seems better since she had the surgery. She denied difficulty with attention or concentration, decision-making, or organization. She lives with her , and manages her own finances and medications, driving, and cooking, apparently without difficulty. She handles her personal cares independently.    Ms. Cornelius has not had any history of psychiatric illness. She met with a psychologist before undergoing bariatric surgery in 2005. She described her mood as okay, stating that she gets stressed out easily, and she has one friend who has  been bothering her. She likes romi to help with stress. She has not had feelings of depression, sadness, or guilt. She denied anxiety, is no more irritable than normal, and has not been crying any more than normal. Her sleep has improved since her surgery, as the tremors are not waking her up. She sleeps seven hours, and feels rested in the morning. She has not been napping during the day. Her appetite has been  too good,  but she has lost a few pounds. She is trying not to eat as much, and she does not snack. Her energy level and interest level have been good. She stated that her motivation can be low, because she sees her , who is retired, sitting in his chair watching television. She denied visual or auditory hallucinations. She denied suicidal ideation or any history of suicide attempts. She drinks alcohol on rare occasions. She stated that she can go months without any alcohol and then have one or two drinks, but she does not like the feeling that she gets with alcohol even though it helps her tremor. She denied drug use or tobacco use. She denied excessive gambling, and also denied other compulsive behaviors.    As noted, Ms. Cornelius has had some difficulty with balance, although she has not been falling. She denied unilateral weakness or numbness. In September she had surgery for a carpal tunnel on her right hand, and she finds that her hand is stiff in the mornings. She has not been experiencing headaches as frequently. She has some back pain, which is not a change for her.    PAST MEDICAL HISTORY: Medical records indicate a history of heart murmur, rheumatic fever, gastroesophageal reflux disease, essential tremor, cataract, osteoarthritis of the knee, diabetes mellitus type II.    CURRENT MEDICATIONS:  Include gabapentin, oxycodone, acetaminophen, diphenhydramine-acetaminophen, calcium carbonate, multiple vitamins, primidone, albuterol, cyanocobalamin, folic acid, gabapentin.    FAMILY [facility]  HISTORY:  Significant for tremor in both parents when they were older, a father and paternal aunt with dementia and their 70s or 80s, and a mother with cardiovascular disease.    BEHAVIORAL OBSERVATIONS    During the evaluation, Ms. Cornelius was pleasant, cooperative, and seemed to understand the instructions. She was alert and oriented to person, place, and time. Tremors were observed clinically, primarily in her left hand. Mood was euthymic. She seemed somewhat anxious and expressed some concerns about her DBS system; she stated that when she tries to turn on the controller it says that it cannot connect to the generator, and it seems to take longer and longer for this to work. She had other questions as well, so an arrangement was made for her to meet with Ivana Martin RN, after this evaluation to answer her questions. Speech was fluent although rate was somewhat slow, and she had some difficulty pronouncing words. Spontaneous conversation was present and entirely appropriate. Internal performance validity measures fell within normal limits. Task engagement appeared to be adequate. The results are believed to accurately reflect her current level of functioning.    MEASURES ADMINISTERED    The following measures were administered by a trained psychometrist, under the direct supervision of a licensed psychologist.    Subtests of the Wechsler Adult Intelligence Scale-4; Reading subtest of the Wide Range Achievement Test-4; subtests of the Wechsler Memory Scale-Revised; Arenas Verbal Learning Test-Revised, Form 3; Cordova Naming Test; Controlled Oral Word Association Test; Semantic Fluency; Trail Making Test; Stroop; Wisconsin Card Sorting Test - 64; Perkins Judgement of Line Orientation Form V; Clock Drawing; Dementia Rating Scale - 2 (DRS-2) Alternate Form; Royal Depression Inventory-2 (BDI-2).     RESULTS AND INTERPRETATION    Overall intellectual functioning was estimated to fall in the borderline range, marginally  below premorbid estimates of low average based on single word reading abilities administered at her prior evaluation. Performance on a screening measure of dementia was average (DRS-2 Total Score = 135/144).    Confrontation naming was mildly impaired for her age. Ability to comprehend and articulate responses to complex social situations was mildly impaired. Verbal abstract reasoning was low average. Letter fluency was moderately slowed, and generative naming to category was mildly slowed.    Attention span was mildly impaired for her age. Divided attention was low average. Performance on a measure of distractibility was average. Psychomotor processing speed was low average.    Basic visual perception, including matching lines and angles, was average for her age. Construction of a clock fell within normal limits. Nonverbal deductive reasoning was low average.    Novel problem-solving, including the ability to generate strategies and solutions, fell within normal limits for her age and level of education.    Immediate recall of verbal narrative material was mildly impaired, with low average recall following a 30 minute delay. On a multiple trial list learning task, immediate recall was mildly impaired, with low average retention (70%) following a 25 minute delay. Recognition memory on this task was low average. Immediate recall of visual material was mildly impaired, with average recall following a 30 minute delay.    On the GDS, a self-report questionnaire, Ms. Cornelius denied expensing significant depressive symptomatology, acknowledging only that she does not feel full of energy and she frequently gets upset over little things.    IMPRESSIONS AND RECOMMENDATIONS    Current results indicate intellectual functioning estimated to fall in the borderline range, marginally below premorbid estimates of low average based on single word reading abilities administered at a prior evaluation. She has mild difficulty with  learning of new information, while she retains information well once she has learned it. Fluency is slowed. Performance otherwise falls within normal limits across cognitive domains. She denied experiencing significant depressive symptomatology.    Compared to her evaluation in November 2016, she has had improvements in memory and visual processing, a decline in phonemic fluency, and performance that otherwise remained stable across cognitive domains.    This pattern of performance does not reflect dementia at this time. It is suggestive of diffuse cerebral involvement, and may at least in part reflect a lifelong developmental disorder. The improvements in cognition over time suggest that she does not appear to be experiencing a progressive neurodegenerative process. She continues to have a good understanding of the surgical procedure and the risks involved. She seems capable of comprehending medical information and making well reasoned decisions for herself. She continues to be a good candidate for surgery from a neurocognitive perspective. Results may serve as an updated baseline of her neurocognitive functioning, and the evaluation may be repeated in the future for comparison should a change in mental status occur.    Judith Ayala, Ph.D., ABPP  Licensed Psychologist, [place] [id number]  Board Certified in Clinical Neuropsychology      Time spent:  Four hours professional time, including interview, record review, data integration, and report writing (CPT 63326); an additional three hours, including testing administered by a psychometrist and interpreted by a neuropsychologist (CPT 16013). ICD-10 diagnosis: G25; F06.8.

## 2017-12-18 NOTE — PROGRESS NOTES
WAIS-IV    Raw              Age Scaled   Similarities   18      7    Comprehension        16     6   Letter Num. Seq.   16     8   Digit Span   17     5   Matrix Reasoning   10     7     WECHSLER MEMORY SCALE - REVISED    Raw Score        MAS          %ile  Information & Orientation       14   Logical Memory  Immed.  19     6     9   Logical Memory  30 min.  14     7   16   Visual Reprod Imm.  28     6     9   Visual Reprod 30 min.  26     9   37   30 Minute Recognition    4     BEARD VERBAL LEARNING TEST - REVISED  Form 3                                              Raw                T                                        Trial 1               5   Trial 2               7   Trial 3  9   Total 1-3     21   34   Learning  4   Delay  7   35   Percent Retained     78   40   True Positives     11   Discrimination Index     10   41   False Positives  1     BOSTON NAMING TEST  Score     46      MAS    5   5 %ile  [    46 w/o cues   2 w/phonemic cues]    CONTROLLED ORAL WORD ASSOC TEST  Score     10              MAS 2   4 %ile    SEMANTIC FLUENCY  Score     31              MAS 6   9 %ile    CLOCK DRAWING  Command  3 /3    Copy   3   /3       TRAIL MAKING TEST         Seconds         Errors           MAS                %ile  A     43    0    7  .    16   B      107    0    7  .    16     STROOP                    Raw   +  Eddie =     Total       MAS      %ile  Word  80  +  - =   80     7    16   Color  53  +  - =   53     6   9   C-W  33  +  - =   33     9    37     WISCONSIN CARD SORTING TEST  # Categories        3            >16      %ile  % persev err.       21          T       44          27 %ile    SADLER JUDGMENT OF LINE ORIENTATION  Form V  Raw         23  MAS   11            DEMENTIA RATING SCALE - 2  Alternate    Raw MAS Raw     MAS   Attention  36   11   Concept  35    8   Init/Psv  35     8  Memory  23    9   Construct    6   10  Total     135/144   9     GERIATRIC DEPRESSION SCALE: 2    MAS = [place] Older Adult  Normative Study Age Scaled Score

## 2018-01-07 ENCOUNTER — HEALTH MAINTENANCE LETTER (OUTPATIENT)
Age: 63
End: 2018-01-07

## 2018-03-07 ENCOUNTER — TELEPHONE (OUTPATIENT)
Dept: NEUROSURGERY | Facility: CLINIC | Age: 63
End: 2018-03-07

## 2018-03-07 ENCOUNTER — DOCUMENTATION ONLY (OUTPATIENT)
Dept: NEUROSURGERY | Facility: CLINIC | Age: 63
End: 2018-03-07

## 2018-03-07 NOTE — TELEPHONE ENCOUNTER
Pt had left side DBS lead/battery implant in March 2017 for treatment of tremor. She called this morning to report that yesterday she had a terrible headache. She thought the HA may be related to her DBS, so she turned it off. The headache mostly resolved within about 3 hours. She took 2 Tylenol tabs at onset. This morning she still has a minor headache and has not turned her device back on. She wants to know if the device caused the HA. It is doubtful, as she has had the system for almost a year with no similar issues. Additionally, the HA did not resolve immediately when she turned the device off. She is concerned since she is having right side DBS surgery in a few weeks. I will f/u with Dr. Javier and get back to pt with any recommendations. In the meantime, pt wants to know if she can turn her system back on. She can try turning it on and may always turn it off if she thinks there is a correlation between HA and stimulation. Pt indicates she just got back from a cruise and wants to know if the HA could be caused by exhaustion from the cruise. HA can be caused by many things, certainly being overly tired can contribute to feeling poorly. If she is concerned about illness, pt should f/u with her PCP. Pt in agreement with plan. No further questions.

## 2018-03-12 ENCOUNTER — TRANSFERRED RECORDS (OUTPATIENT)
Dept: HEALTH INFORMATION MANAGEMENT | Facility: CLINIC | Age: 63
End: 2018-03-12

## 2018-03-13 DIAGNOSIS — Z01.818 PREOPERATIVE EVALUATION TO RULE OUT SURGICAL CONTRAINDICATION: ICD-10-CM

## 2018-03-13 DIAGNOSIS — G25.0 ESSENTIAL TREMOR: Primary | ICD-10-CM

## 2018-03-16 ENCOUNTER — TELEPHONE (OUTPATIENT)
Dept: NEUROLOGY | Facility: CLINIC | Age: 63
End: 2018-03-16

## 2018-03-16 NOTE — TELEPHONE ENCOUNTER
Received paperwork from Josseline Hernandez MD from [facility], it is a Neurology Outpatient Follow up Note and contained pages 2 through 7 and labeled was placed on papers and thy were placed in scanning bin for labeled paperwork since the next appt is 4/18/18

## 2018-03-20 DIAGNOSIS — G25.0 ESSENTIAL TREMOR: Primary | ICD-10-CM

## 2018-03-22 ENCOUNTER — OFFICE VISIT (OUTPATIENT)
Dept: SURGERY | Facility: CLINIC | Age: 63
End: 2018-03-22
Payer: COMMERCIAL

## 2018-03-22 ENCOUNTER — APPOINTMENT (OUTPATIENT)
Dept: SURGERY | Facility: CLINIC | Age: 63
End: 2018-03-22
Payer: COMMERCIAL

## 2018-03-22 ENCOUNTER — ALLIED HEALTH/NURSE VISIT (OUTPATIENT)
Dept: SURGERY | Facility: CLINIC | Age: 63
End: 2018-03-22
Payer: COMMERCIAL

## 2018-03-22 ENCOUNTER — ANESTHESIA EVENT (OUTPATIENT)
Dept: SURGERY | Facility: CLINIC | Age: 63
End: 2018-03-22
Payer: COMMERCIAL

## 2018-03-22 VITALS
DIASTOLIC BLOOD PRESSURE: 70 MMHG | SYSTOLIC BLOOD PRESSURE: 131 MMHG | WEIGHT: 195.9 LBS | HEART RATE: 81 BPM | RESPIRATION RATE: 18 BRPM | OXYGEN SATURATION: 97 % | TEMPERATURE: 98.4 F | BODY MASS INDEX: 31.15 KG/M2

## 2018-03-22 DIAGNOSIS — G25.0 ESSENTIAL TREMOR: ICD-10-CM

## 2018-03-22 DIAGNOSIS — Z01.818 PREOPERATIVE EVALUATION TO RULE OUT SURGICAL CONTRAINDICATION: ICD-10-CM

## 2018-03-22 DIAGNOSIS — Z01.818 PREOP EXAMINATION: Primary | ICD-10-CM

## 2018-03-22 LAB
ALBUMIN UR-MCNC: NEGATIVE MG/DL
APPEARANCE UR: CLEAR
BILIRUB UR QL STRIP: NEGATIVE
COLOR UR AUTO: YELLOW
GLUCOSE UR STRIP-MCNC: NEGATIVE MG/DL
HBA1C MFR BLD: 6.6 % (ref 4.3–6)
HGB UR QL STRIP: NEGATIVE
KETONES UR STRIP-MCNC: NEGATIVE MG/DL
LEUKOCYTE ESTERASE UR QL STRIP: ABNORMAL
NITRATE UR QL: NEGATIVE
PH UR STRIP: 6 PH (ref 5–7)
RBC #/AREA URNS AUTO: <1 /HPF (ref 0–2)
SOURCE: ABNORMAL
SP GR UR STRIP: 1.02 (ref 1–1.03)
SQUAMOUS #/AREA URNS AUTO: <1 /HPF (ref 0–1)
UROBILINOGEN UR STRIP-MCNC: 2 MG/DL (ref 0–2)
WBC #/AREA URNS AUTO: 2 /HPF (ref 0–5)

## 2018-03-22 RX ORDER — ZINC ACETATE ANHYDROUS AND ZINC GLUCONATE 2; 1 [HP_X]/1; [HP_X]/1
1 TABLET ORAL 2 TIMES DAILY
COMMUNITY

## 2018-03-22 NOTE — ANESTHESIA PREPROCEDURE EVALUATION
Anesthesia Evaluation     . Pt has had prior anesthetic. Type: General and MAC    No history of anesthetic complications          ROS/MED HX    ENT/Pulmonary:     (+)Intermittent asthma Treatment: Inhaler prn,  , . .    Neurologic:     (+)other neuro Essential tremor, effecting the head and both arms    Cardiovascular:     (+) ----. : . . . :. valvular problems/murmurs (Hx cardiac murmur) History of rheumatic fever:. No previous cardiac testing       METS/Exercise Tolerance:  >4 METS   Hematologic:  - neg hematologic  ROS       Musculoskeletal:   (+) , , other musculoskeletal- 3rd digit in the left hand, S/P joint replacement, so it does not fully extend or bend.  Chronic back and right knee pain.  +      GI/Hepatic:     (+) GERD Other, Other GI/Hepatic History of bariatric surgery      Renal/Genitourinary:  - ROS Renal section negative       Endo:     (+) type II DM Last HgA1c: 6.1 date: 6/2017 Not using insulin - not using insulin pump Normal glucose range: S/p bariatric surgery under control not previously admitted for DM/DKA .   (-) Type I DM   Psychiatric:  - neg psychiatric ROS       Infectious Disease:  - neg infectious disease ROS       Malignancy:      - no malignancy   Other:    (+) H/O Chronic Pain,                   Physical Exam  Normal systems: cardiovascular and pulmonary    Airway   Mallampati: III  TM distance: >3 FB  Neck ROM: full    Dental   (+) missing    Cardiovascular   Rhythm and rate: regular and normal      Pulmonary    breath sounds clear to auscultation               PAC Discussion and Assessment    ASA Classification: 3  Case is suitable for: [place]  Anesthetic techniques and relevant risks discussed: MAC with GA as backup and GA  Invasive monitoring and risk discussed:   Types:   Possibility and Risk of blood transfusion discussed:   NPO instructions given:   Additional anesthetic preparation and risks discussed:   Needs early admission to pre-op area:   Other:     PAC Resident/NP  Anesthesia Assessment:  Hilary Cornelius is a 63 year old female  who presents for pre-operative H & P in preparation for a Phase I Stealth Assisted   Side Deep Brain Stimulator Placement on 3/23/18 and Phase II Placement Of Deep Brain Stimulator Generator/Battery on 3/30/18  with Dr. Javier for essential tremor at the [facility].    PAC referral for risk assessment and optimization for anesthesia with comorbid conditions of:    Pre-operative considerations:  1.  Cardiac:  Functional status METS >4   Risk of Major Adverse Cardiac event: 0.4%  - History of rheumatic fever, presence of a cardiac murmur  -7/8/2015 Myocardial Perfusion:  FINDINGS: Myocardial perfusion scintigraphy.Lexiscan infusion was performed.   During the infusion, the patient experienced chest tightness which resolved with 50 mg Aminophyllin IV. Left ventricular ejection fraction is calculated at 67%. No focal wall motion   abnormalities. No definite fixed or reversible perfusion abnormalities.   IMPRESSION: Low-risk examination. Normal left ventricular ejection fraction without a definite fixed or reversible perfusion abnormality.  2.  Pulm:   ANNA MARIE risk:  Low  -Exercise induced asthma    3.  GI:  Risk of PONV score =2 .  If > 2, anti-emetic intervention recommended.    Discussed the above A/P with Dr. Loera.  Patient is optimized and is an acceptable candidate for the proposed procedure.  No further diagnostic evaluation is needed.    Skye Nieves MS, PA-C  03/22/18 2:42 PM        Mid-Level Provider/Resident:   Date:   Time:     Attending Anesthesiologist Anesthesia Assessment:  I discussed the patient with the ELVER but I did not personally see the patient during this visit.   Patient currently medically optimized for the proposed procedure.   Final anesthetic plan and recommendations to be made by the attending anesthesiologist on the day of surgery.       Reviewed and Signed by PAC  Anesthesiologist  Anesthesiologist: HUGO  Date: 3/22/18  Time:   Pass/Fail: Pass  Disposition:     PAC Pharmacist Assessment:        Pharmacist:   Date:   Time:      Anesthesia Plan      History & Physical Review  History and physical reviewed and following examination; no interval change.    ASA Status:  3 .        Plan for MAC with Intravenous induction. Maintenance will be TIVA.    PONV prophylaxis:  Ondansetron (or other 5HT-3)       Postoperative Care      Consents  Anesthetic plan, risks, benefits and alternatives discussed with:  Patient..                          .

## 2018-03-22 NOTE — MR AVS SNAPSHOT
After Visit Summary   3/22/2018    Hilary Cornelius    MRN: [id number]           Patient Information     Date Of Birth          1955        Visit Information        Provider Department      3/22/2018 2:30 PM Rn, [facility] Preoperative Assessment [facility]        Care Instructions    Preparing for Your Surgery      Name:  Hilary Cornelius   MRN:  [id number]   :  1955   Today's Date:  3/22/2018     Arriving for surgery:  Phase 1 Deep Brain Stimulator   Surgery date:  2018  Arrival time:  7:00 am  Please come to:       [facility] [address]  [address] [place]  [address]    -   parking is available in front of the hospital from 5:15 am to 8:00 pm    -  Stop at the Information Desk in the lobby    -   Inform the information person that you are here for surgery. An escort to 3c will be provided. If you would not like an escort, please proceed to 3C on the 3rd floor. [phone]     What can I eat or drink?  -  You may have solid food or milk products until 8 hours prior to your surgery.  Nothing after midnight   -  You may have water, apple juice or 7up/Sprite until 2 hours prior to your surgery.  Until 7 am arrival time     Which medicines can I take?  -  Do NOT take these medications in the morning, the day of surgery:     Zicam      Primidone (mysonline ) do not take the evening before surgery      Vitamins and supplements       -  Please take these medications the day of surgery:     Gabapentin       Inhalers as usual and bring to hospital       How do I prepare myself?  -  Take two showers: one the night before surgery; and one the morning of surgery.         Use Scrubcare or Hibiclens to wash from neck down.  You may use your own shampoo and conditioner. No other hair products.   -  Do NOT use lotion, powder, deodorant, or antiperspirant the day of your surgery.  -  Do NOT wear any makeup, fingernail polish or jewelry.  -Do  not bring your own medications to the hospital, except for inhalers and eye drops.  -  Bring your ID and insurance card.    Questions or Concerns:  If you have questions or concerns regarding the day of surgery, please call the Preoperative Assessment Center (PAC), Monday-Friday 7AM-7PM:  [phone].  After surgery please call your surgeons office.           AFTER YOUR SURGERY  Breathing exercises   Breathing exercises help you recover faster. Take deep breaths and let the air out slowly. This will:     Help you wake up after surgery.    Help prevent complications like pneumonia.  Preventing complications will help you go home sooner.   We may give you a breathing device (incentive spirometer) to encourage you to breathe deeply.   Nausea and vomiting   You may feel sick to your stomach after surgery; if so, let your nurse know.    Pain control:  After surgery, you may have pain. Our goal is to help you manage your pain. Pain medicine will help you feel comfortable enough to do activities that will help you heal.  These activities may include breathing exercises, walking and physical therapy.   To help your health care team treat your pain we will ask: 1) If you have pain  2) where it is located 3) describe your pain in your words  Methods of pain control include medications given by mouth, vein or by nerve block for some surgeries.  We may give you a pain control pump that will:  1) Deliver the medicine through a tube placed in your vein  2) Control the amount of medicine you receive  3) Allow you to push a button to deliver a dose of pain medicine  Sequential Compression Device (SCD) or Pneumo Boots:  You may need to wear SCD S on your legs or feet. These are wraps connected to a machine that pumps in air and releases it. The repeated pumping helps prevent blood clots from forming.           Follow-ups after your visit        Your next 10 appointments already scheduled     Mar 22, 2018  3:00 PM CDT   (Arrive by  2:45 PM)   PAC Anesthesia Consult with  Pac Anesthesiologist   [facility] Preoperative Assessment Center ([facility])    [address]  4th Floor  [facility] [address] [phone]            Mar 22, 2018  3:15 PM CDT   LAB with STEVE LAB   [facility] ([facility])    [address]  1st Floor  [facility] [address] [phone]           Please do not eat 10-12 hours before your appointment if you are coming in fasting for labs on lipids, cholesterol, or glucose (sugar). This does not apply to pregnant women. Water, hot tea and black coffee (with nothing added) are okay. Do not drink other fluids, diet soda or chew gum.            Mar 23, 2018   Procedure with Henok Javier MD   [facility] [place], Same Day Surgery (--)    [address] [facility] [address]   [phone]            Mar 23, 2018 10:00 AM CDT   CT HEAD W/O CONTRAST with UUCT4   [place] [place] ([facility] [place])    [address] [facility] [address] [phone]           Please bring any scans or X-rays taken at other hospitals, if similar tests were done. Also bring a list of your medicines, including vitamins, minerals and over-the-counter drugs. It is safest to leave personal items at home.  Be sure to tell your doctor:   If you have any allergies.   If there s any chance you are pregnant.   If you are breastfeeding.  You do not need to do anything special to prepare for this exam.  Please wear loose clothing, such as a sweat suit or jogging clothes. Avoid snaps, zippers and other metal. We may ask you to undress and put on a hospital gown.            Mar 30, 2018   Procedure with Henok Javier MD   [facility] [place], Same Day Surgery (--)    [address] [facility] [address]   [phone]            Apr 27, 2018 12:40 PM CDT   CT HEAD W/O CONTRAST with UCCT1   [facility] CT ([facility]  [facility])    [address] [place] [id number] [phone]           Please bring any scans or X-rays taken at other hospitals, if similar tests were done. Also bring a list of your medicines, including vitamins, minerals and over-the-counter drugs. It is safest to leave personal items at home.  Be sure to tell your doctor:   If you have any allergies.   If there s any chance you are pregnant.   If you are breastfeeding.  You do not need to do anything special to prepare for this exam.  Please wear loose clothing, such as a sweat suit or jogging clothes. Avoid snaps, zippers and other metal. We may ask you to undress and put on a hospital gown.            Apr 27, 2018  1:30 PM CDT   (Arrive by 1:15 PM)   Return Movement Disorder with Randell Flanagan MD   [facility] ([facility])    [address] [place] [id number] [phone]              Future tests that were ordered for you today     Open Future Orders        Priority Expected Expires Ordered    Hemoglobin A1c Routine 3/22/2018 4/21/2018 3/22/2018            Who to contact     Please call your clinic at [phone] to:    Ask questions about your health    Make or cancel appointments    Discuss your medicines    Learn about your test results    Speak to your doctor            Additional Information About Your Visit        [employer] Information     [employer] gives you secure access to your electronic health record. If you see a primary care provider, you can also send messages to your care team and make appointments. If you have questions, please call your primary care clinic.  If you do not have a primary care provider, please call [phone] and they will assist you.      [employer] is an electronic gateway that provides easy, online access to your medical records. With [employer], you can request a clinic appointment, read your test results, renew a  prescription or communicate with your care team.     To access your existing account, please contact your [facility] or call [phone] for assistance.        Care EveryWhere ID     This is your Care EveryWhere ID. This could be used by other organizations to access your [place] medical records  [id number]         Blood Pressure from Last 3 Encounters:   03/22/18 131/70   08/30/17 129/75   04/19/17 133/80    Weight from Last 3 Encounters:   03/22/18 88.9 kg (195 lb 14.4 oz)   04/19/17 83.5 kg (184 lb)   03/24/17 83.9 kg (184 lb 15.5 oz)              Today, you had the following     No orders found for display       Primary Care Provider Office Phone # Fax #    Zachary Holguin [phone]       [facility] [address]  [facility] [address]        Equal Access to Services     JANIS TABARES : Hadii aad ku hadasho Soomaali, waaxda luqadaha, qaybta kaalmada adeegyada, waxay belénin haypingn pricilla brown . So [facility] [phone].    ATENCIÓN: Si habla español, tiene a paz disposición servicios gratuitos de asistencia lingüística. Llame al [phone].    We comply with applicable federal civil rights laws and [place] laws. We do not discriminate on the basis of race, color, national origin, age, disability, sex, sexual orientation, or gender identity.            Thank you!     Thank you for choosing [facility]  for your care. Our goal is always to provide you with excellent care. Hearing back from our patients is one way we can continue to improve our services. Please take a few minutes to complete the written survey that you may receive in the mail after your visit with us. Thank you!             Your Updated Medication List - Protect others around you: Learn how to safely use, store and throw away your medicines at www.disposemymeds.org.          This list is accurate as of 3/22/18  2:35 PM.  Always use your most recent med  list.                   Brand Name Dispense Instructions for use Diagnosis    acetaminophen 325 MG tablet    TYLENOL    100 tablet    Take 2 tablets (650 mg) by mouth every 4 hours as needed for other (surgical pain)    Essential tremor       albuterol 108 (90 BASE) MCG/ACT Inhaler    PROAIR HFA/PROVENTIL HFA/VENTOLIN HFA     Inhale 2 puffs into the lungs every 6 hours        calcium carbonate 600 MG tablet    OS-GISELA 600 mg [place]. Ca     Take 600 mg by mouth daily        cyanocobalamin 2500 MCG sublingual tablet    VITAMIN B-12     Place 2,500 mcg under the tongue daily        ferrous fumarate 65 mg ([place]. FE)-Vitamin C 125 mg  MG Tabs tablet    VITRON C     Take 1 tablet by mouth daily    Essential tremor       folic acid 400 MCG tablet    FOLVITE     Take 400 mcg by mouth daily        gabapentin 600 MG tablet    NEURONTIN    270 tablet    Take 1 tablet (600 mg) by mouth 3 times daily    Chronic midline low back pain without sciatica       Multiple vitamin Tabs      Take 1 tablet by mouth daily        primidone 250 MG tablet    MYSOLINE     1 in am and 2 in the evening        TYLENOL PM EXTRA STRENGTH  MG tablet   Generic drug:  diphenhydrAMINE-acetaminophen      Take 2 tablets by mouth nightly as needed for sleep    Essential tremor       ZICAM COLD REMEDY Tbdp      Take 1 tablet by mouth 2 times daily    Essential tremor

## 2018-03-22 NOTE — H&P
Pre-Operative H & P     CC:  Preoperative exam to assess for increased cardiopulmonary risk while undergoing surgery and anesthesia.    Date of Encounter: March 22, 2018   Primary Care Physician:  Zachary Holguin   Reason for Visit/Surgery:  Essential tremor [G25.0]      MARCO Cornelius is a 63 year old female  who presents for pre-operative H & P in preparation for a Phase I Stealth Assisted   Side Deep Brain Stimulator Placement on 3/23/18 and Phase II Placement Of Deep Brain Stimulator Generator/Battery on 3/30/18  with Dr. Javier for essential tremor at the [facility].     She had a long-standing history of essential tremor, which was diagnosed in the 1990s at [facility]. Due to worsening of the  tremor despite multiple medications in the past, she was referred to the [facility] and underwent left thalamic DBS surgery in March 2017 and had significant improvement of tremor.  She continues to have trouble eating due to tremor.   She does think her tremor is worse when she is more stressed, so she is interested in pursuing DBS on the right side, so the above surgery has been recommended.        History is obtained from the  medical record including Care Everywhere.        Past Medical History  Past Medical History:   Diagnosis Date     Chronic pain 11/8/2016     Cognitive disorder 12/4/2016    2016 evaluation   IMPRESSIONS AND RECOMMENDATIONS  Current results indicate overall intellectual functioning estimated fall in the mildly impaired range, marginally below premorbid estimates of low average based on single word reading abilities. She demonstrates a relative inefficiency in learning of new material, but retains information quite well once she has learned it. Visual-spatial abilities     Exercise-induced asthma      Gastroesophageal reflux disease 3/30/2015    XR ESOPHAGRAM7/8/2016  [facility]  Result Narrative  This document is currently in  Final Status  Exam Accession# [id number]  XR ESOPHAGRAM  CLINICAL INFORMATION: Dysphagia;   COMPARISON: None.  FINDINGS: The esophagus is normal in course and caliber in this post gastric bypass patient. No intrinsic or extrinsic mass lesions are identified. No strictures. There is a small hiatal hernia. Gastroesophageal reflux is observed during this examination  with numerous tertiary contractions and delayed esophageal clearance.  IMPRESSION:  1. Gastroesophageal reflux with numerous tertiary contractions and delayed esophageal clearance. 2. Sliding hiatal hernia in this post gastric bypass patient.  Dictated By: Dandre Wyatt MD 7/8/2016 9:47 AM Edited By: MIKE 7/8/2016 10:02 AM  Electronically Signed: Dandre Wyatt MD 7/8/2016 4:24 PM   Status Results Details         H/O bariatric surgery 11/08/2016     Headache disorder 11/05/2016     Heart murmur 11/8/2016     Rheumatic fever      T2DM (type 2 diabetes mellitus) (H)     Under control s/p gastric bypass     Tremor 11/5/2016        Past Surgical History  Past Surgical History:   Procedure Laterality Date     APPENDECTOMY       CHOLECYSTECTOMY       COLONOSCOPY       GASTRIC BYPASS  2005     IMPLANT DEEP BRAIN STIMULATION GENERATOR / BATTERY Left 3/24/2017    Procedure: IMPLANT DEEP BRAIN STIMULATION GENERATOR / BATTERY;  Surgeon: Henok Javier MD;  Location: [facility] OR     OPTICAL TRACKING SYSTEM INSERTION DEEP BRAIN STIMULATION Left 3/14/2017    Procedure: OPTICAL TRACKING SYSTEM INSERTION DEEP BRAIN STIMULATION;  Surgeon: Henok Javier MD;  Location: [facility] OR     thoracic spine surgery       TUBAL LIGATION         Hx of Blood transfusions/reactions: No transfusion history       Personal or FH with difficulty with Anesthesia:  None    Prior to Admission Medications  Current Outpatient Prescriptions   Medication Sig Dispense Refill     diphenhydrAMINE-acetaminophen (TYLENOL PM EXTRA STRENGTH)  MG tablet Take 2 tablets by mouth nightly as needed  for sleep       Homeopathic Products (ZICAM COLD REMEDY) TBDP Take 1 tablet by mouth 2 times daily       ferrous fumarate 65 mg, [place]. FE,-Vitamin C 125 mg (VITRON C)  MG TABS tablet Take 1 tablet by mouth daily       gabapentin (NEURONTIN) 600 MG tablet Take 1 tablet (600 mg) by mouth 3 times daily 270 tablet 3     acetaminophen (TYLENOL) 325 MG tablet Take 2 tablets (650 mg) by mouth every 4 hours as needed for other (surgical pain) 100 tablet      calcium carbonate (OS-GISELA 600 MG [place]. CA) 600 MG tablet Take 600 mg by mouth daily       Multiple vitamin TABS Take 1 tablet by mouth daily       primidone (MYSOLINE) 250 MG tablet 1 in am and 2 in the evening       cyabnocobalamin (VITAMIN B-12) 2500 MCG sublingual tablet Place 2,500 mcg under the tongue daily       folic acid (FOLVITE) 400 MCG tablet Take 400 mcg by mouth daily       albuterol (PROAIR HFA, PROVENTIL HFA, VENTOLIN HFA) 108 (90 BASE) MCG/ACT inhaler Inhale 2 puffs into the lungs every 6 hours       [DISCONTINUED] gabapentin (NEURONTIN) 600 MG tablet Take 600 mg by mouth 3 times daily           Allergies  Asa [aspirin]; Bee venom; and Penicillins     Social History  Social History     Social History     Marital status:      Spouse name: N/A     Number of children: N/A     Years of education: N/A     Occupational History     Not on file.     Social History Main Topics     Smoking status: Never Smoker     Smokeless tobacco: Never Used     Alcohol use 0.0 oz/week     0 Standard drinks or equivalent per week      Comment: social     Drug use: Not on file     Sexual activity: Not on file     Other Topics Concern     Not on file     Social History Narrative    from [facility].  to Adilson Cornelius.        CIA HISTORY: The patient was born in [place], [place]. The patient was educated formally through 12 years. The patient is presently disabled due to her spinal difficulties. The patient has been  40 years. The patient  has 3 children of that marriage. The patient does not use tobacco. The patient has social use of alcohol. The patient has no history of drug or intravenous drug use or abuse.         FAMILY HISTORY: A strong family history of tremor with mother having tremor, but also a strong paternal family history of tremor with father with multiple cousins. Patient does have one cousin with Parkinson disease. The patients father suffered with dementia of the Alzheimer type.     2 Daughters and son    Daughter lives 20 miles away in [place] from her her    Daughter lives in [place]    Son lives in [place]        Staying at the [facility]          Family History  No family history of bleeding, clotting disorders or complications with anesthesia.      ROS   The complete review of systems is negative other than noted in the HPI or here.   Constitutional: Denies  fevers/chills.    EENT: Denies difficulty opening mouth or swallowing.  Cardiovascular: Denies pain, tightness or squeezing in chest, upper abdomen, shoulder, or neck.  Denies CORCORAN or orthopnea, palpitations or syncope.  Respiratory: Denies significant shortness of breath or cough.    GI: Denies frequent heartburn, nausea/vomiting     : Denies dysuria   Musculoskeletal: Denies joint pain or swelling.    Skin: Denies rashes, infection or wounds.    Hematologic: Denies prolonged bleeding, anemia or blood clot history  Neurologic: Denies history of stroke, TIA, migraines, seizures, dizziness, numbness/tingling  Psychiatric: Denies changes in mood or affect.      Cardiology Tests: (personally reviewed):   Review Results Below in A/P    Labs: (personally reviewed):  Lab Results   Component Value Date    WBC 3.7 (L) 03/23/2018    HGB 12.7 03/23/2018    HCT 37.4 03/23/2018     (L) 03/23/2018    INR 1.03 03/23/2018    PTT 24 03/23/2018     03/23/2018    POTASSIUM 3.9 03/23/2018    GISELA 8.3 (L) 03/23/2018     (H) 03/23/2018    CR 0.50  (L) 03/23/2018    BUN 10 03/23/2018    CO2 25 03/23/2018          Physical Exam:  No LMP recorded. Patient is postmenopausal.   Vital signs:  /70  Pulse 81  Temp 98.4  F (36.9  C) (Oral)  Resp 18  Wt 88.9 kg (195 lb 14.4 oz)  SpO2 97%  BMI 31.15 kg/m2    Constitutional: Awake, alert, cooperative, no apparent distress, and appears stated age.  Eyes: Pupils equal, round and reactive to light, sclera clear, conjunctiva normal.  HENT: Normocephalic, oral pharynx with moist mucus membranes. No goiter appreciated.   Respiratory: Clear to auscultation bilaterally, no crackles or wheezing.  Cardiovascular: Regular rate and rhythm and no overt murmur noted.  No carotid bruits auscultated. No edema. Palpable pulses to radial  DP and PT arteries.   GI: Normal bowel sounds, soft, non-distended, non-tender, no masses palpated  Skin: Warm and dry.  No rashes at anticipated surgical site.   Musculoskeletal: Full extension of the neck.  No redness, warmth, or swelling of the joints noted. Gross motor strength is normal.    Neurologic: Awake, alert, oriented to name, place and time.  Gait is normal.   Neuropsychiatric: Calm, cooperative. Normal affect.     Assessment/Plan  Hilayr Cornelius is a 63 year old female  who presents for pre-operative H & P in preparation for a Phase I Stealth Assisted   Side Deep Brain Stimulator Placement on 3/23/18 and Phase II Placement Of Deep Brain Stimulator Generator/Battery on 3/30/18  with Dr. Javier for essential tremor at the [facility].    PAC referral for risk assessment and optimization for anesthesia with comorbid conditions of:    Pre-operative considerations:  1.  Cardiac:  Functional status METS >4   Risk of Major Adverse Cardiac event: 0.4%  - History of rheumatic fever, presence of a cardiac murmur  -7/8/2015 Myocardial Perfusion:  FINDINGS: Myocardial perfusion scintigraphy.Lexiscan infusion was performed.   During the infusion,  the patient experienced chest tightness which resolved with 50 mg Aminophyllin IV. Left ventricular ejection fraction is calculated at 67%. No focal wall motion   abnormalities. No definite fixed or reversible perfusion abnormalities.   IMPRESSION: Low-risk examination. Normal left ventricular ejection fraction without a definite fixed or reversible perfusion abnormality.  2.  Pulm:   ANNA MARIE risk:  Low  -Exercise induced asthma    3.  GI:  Risk of PONV score =2 .  If > 2, anti-emetic intervention recommended.    Discussed the above A/P with Dr. Loera.  Patient is optimized and is an acceptable candidate for the proposed procedure.  No further diagnostic evaluation is needed.      AVS given to patient regarding medication instructions,  surgery time/arrival time and NPO status.  Skye Nieves MS PA-C   Preoperative Assessment Center  [facility]  Clinic and Surgery Center  Phone: [phone]  Fax: [phone]

## 2018-03-22 NOTE — PATIENT INSTRUCTIONS
Preparing for Your Surgery      Name:  Hilary Cornelius   MRN:  [id number]   :  1955   Today's Date:  3/22/2018     Arriving for surgery:  Phase 1 Deep Brain Stimulator   Surgery date:  2018  Arrival time:  7:00 am  Please come to:       [facility] [address]  [address] [place] [address]  [id number]    -   parking is available in front of the hospital from 5:15 am to 8:00 pm    -  Stop at the Information Desk in the lobby    -   Inform the information person that you are here for surgery. An escort to 3c will be provided. If you would not like an escort, please proceed to 3C on the 3rd floor. [phone]     What can I eat or drink?  -  You may have solid food or milk products until 8 hours prior to your surgery.  Nothing after midnight   -  You may have water, apple juice or 7up/Sprite until 2 hours prior to your surgery.  Until 7 am arrival time     Which medicines can I take?  -  Do NOT take these medications in the morning, the day of surgery:     Zicam      Primidone (mysonline ) do not take the evening before surgery      Vitamins and supplements       -  Please take these medications the day of surgery:     Gabapentin       Inhalers as usual and bring to hospital       How do I prepare myself?  -  Take two showers: one the night before surgery; and one the morning of surgery.         Use Scrubcare or Hibiclens to wash from neck down.  You may use your own shampoo and conditioner. No other hair products.   -  Do NOT use lotion, powder, deodorant, or antiperspirant the day of your surgery.  -  Do NOT wear any makeup, fingernail polish or jewelry.  -Do not bring your own medications to the hospital, except for inhalers and eye drops.  -  Bring your ID and insurance card.    Questions or Concerns:  If you have questions or concerns regarding the day of surgery, please call the Preoperative Assessment Center (PAC), Monday-Friday 7AM-7PM:  [phone].  After  surgery please call your surgeons office.           AFTER YOUR SURGERY  Breathing exercises   Breathing exercises help you recover faster. Take deep breaths and let the air out slowly. This will:     Help you wake up after surgery.    Help prevent complications like pneumonia.  Preventing complications will help you go home sooner.   We may give you a breathing device (incentive spirometer) to encourage you to breathe deeply.   Nausea and vomiting   You may feel sick to your stomach after surgery; if so, let your nurse know.    Pain control:  After surgery, you may have pain. Our goal is to help you manage your pain. Pain medicine will help you feel comfortable enough to do activities that will help you heal.  These activities may include breathing exercises, walking and physical therapy.   To help your health care team treat your pain we will ask: 1) If you have pain  2) where it is located 3) describe your pain in your words  Methods of pain control include medications given by mouth, vein or by nerve block for some surgeries.  We may give you a pain control pump that will:  1) Deliver the medicine through a tube placed in your vein  2) Control the amount of medicine you receive  3) Allow you to push a button to deliver a dose of pain medicine  Sequential Compression Device (SCD) or Pneumo Boots:  You may need to wear SCD S on your legs or feet. These are wraps connected to a machine that pumps in air and releases it. The repeated pumping helps prevent blood clots from forming.

## 2018-03-23 ENCOUNTER — ANESTHESIA (OUTPATIENT)
Dept: SURGERY | Facility: CLINIC | Age: 63
End: 2018-03-23
Payer: COMMERCIAL

## 2018-03-23 ENCOUNTER — APPOINTMENT (OUTPATIENT)
Dept: GENERAL RADIOLOGY | Facility: CLINIC | Age: 63
End: 2018-03-23
Attending: NEUROLOGICAL SURGERY
Payer: COMMERCIAL

## 2018-03-23 ENCOUNTER — HOSPITAL ENCOUNTER (INPATIENT)
Facility: CLINIC | Age: 63
LOS: 1 days | Discharge: HOME | End: 2018-03-24
Attending: NEUROLOGICAL SURGERY | Admitting: NEUROLOGICAL SURGERY
Payer: COMMERCIAL

## 2018-03-23 ENCOUNTER — APPOINTMENT (OUTPATIENT)
Dept: GENERAL RADIOLOGY | Facility: CLINIC | Age: 63
End: 2018-03-23
Attending: STUDENT IN AN ORGANIZED HEALTH CARE EDUCATION/TRAINING PROGRAM
Payer: COMMERCIAL

## 2018-03-23 ENCOUNTER — HOSPITAL ENCOUNTER (OUTPATIENT)
Dept: CT IMAGING | Facility: CLINIC | Age: 63
End: 2018-03-23
Attending: NEUROLOGICAL SURGERY | Admitting: NEUROLOGICAL SURGERY
Payer: COMMERCIAL

## 2018-03-23 DIAGNOSIS — Z01.818 PREOPERATIVE EVALUATION OF A MEDICAL CONDITION TO RULE OUT SURGICAL CONTRAINDICATIONS (TAR REQUIRED): Primary | ICD-10-CM

## 2018-03-23 DIAGNOSIS — G25.0 ESSENTIAL TREMOR: ICD-10-CM

## 2018-03-23 DIAGNOSIS — Z96.89 S/P DEEP BRAIN STIMULATOR PLACEMENT: ICD-10-CM

## 2018-03-23 LAB
ANION GAP SERPL CALCULATED.3IONS-SCNC: 9 MMOL/L (ref 3–14)
APTT PPP: 24 SEC (ref 22–37)
BUN SERPL-MCNC: 10 MG/DL (ref 7–30)
CALCIUM SERPL-MCNC: 8.3 MG/DL (ref 8.5–10.1)
CHLORIDE SERPL-SCNC: 106 MMOL/L (ref 94–109)
CO2 SERPL-SCNC: 25 MMOL/L (ref 20–32)
CREAT SERPL-MCNC: 0.5 MG/DL (ref 0.52–1.04)
ERYTHROCYTE [DISTWIDTH] IN BLOOD BY AUTOMATED COUNT: 12.9 % (ref 10–15)
GFR SERPL CREATININE-BSD FRML MDRD: >90 ML/MIN/1.7M2
GLUCOSE BLDC GLUCOMTR-MCNC: 128 MG/DL (ref 70–99)
GLUCOSE BLDC GLUCOMTR-MCNC: 134 MG/DL (ref 70–99)
GLUCOSE SERPL-MCNC: 130 MG/DL (ref 70–99)
HCT VFR BLD AUTO: 37.4 % (ref 35–47)
HGB BLD-MCNC: 12.7 G/DL (ref 11.7–15.7)
INR PPP: 1.03 (ref 0.86–1.14)
MCH RBC QN AUTO: 30.6 PG (ref 26.5–33)
MCHC RBC AUTO-ENTMCNC: 34 G/DL (ref 31.5–36.5)
MCV RBC AUTO: 90 FL (ref 78–100)
PLATELET # BLD AUTO: 139 10E9/L (ref 150–450)
POTASSIUM SERPL-SCNC: 3.9 MMOL/L (ref 3.4–5.3)
RBC # BLD AUTO: 4.15 10E12/L (ref 3.8–5.2)
SODIUM SERPL-SCNC: 140 MMOL/L (ref 133–144)
WBC # BLD AUTO: 3.7 10E9/L (ref 4–11)

## 2018-03-23 PROCEDURE — 20000004 ZZH R&B ICU UMMC

## 2018-03-23 PROCEDURE — 27210995 ZZH RX 272: Performed by: NEUROLOGICAL SURGERY

## 2018-03-23 PROCEDURE — 87640 STAPH A DNA AMP PROBE: CPT | Performed by: NEUROLOGICAL SURGERY

## 2018-03-23 PROCEDURE — 85027 COMPLETE CBC AUTOMATED: CPT | Performed by: NEUROLOGICAL SURGERY

## 2018-03-23 PROCEDURE — 27810325 ZZHC OR IMPLANT OTHER OPNP: Performed by: NEUROLOGICAL SURGERY

## 2018-03-23 PROCEDURE — 25000125 ZZHC RX 250: Performed by: NURSE ANESTHETIST, CERTIFIED REGISTERED

## 2018-03-23 PROCEDURE — 37000009 ZZH ANESTHESIA TECHNICAL FEE, EACH ADDTL 15 MIN: Performed by: NEUROLOGICAL SURGERY

## 2018-03-23 PROCEDURE — 27210794 ZZH OR GENERAL SUPPLY STERILE: Performed by: NEUROLOGICAL SURGERY

## 2018-03-23 PROCEDURE — 71000014 ZZH RECOVERY PHASE 1 LEVEL 2 FIRST HR: Performed by: NEUROLOGICAL SURGERY

## 2018-03-23 PROCEDURE — 00H03MZ INSERTION OF NEUROSTIMULATOR LEAD INTO BRAIN, PERCUTANEOUS APPROACH: ICD-10-PCS | Performed by: NEUROLOGICAL SURGERY

## 2018-03-23 PROCEDURE — 36000074 ZZH SURGERY LEVEL 6 1ST 30 MIN - UMMC: Performed by: NEUROLOGICAL SURGERY

## 2018-03-23 PROCEDURE — 93005 ELECTROCARDIOGRAM TRACING: CPT

## 2018-03-23 PROCEDURE — 25000128 H RX IP 250 OP 636: Performed by: NURSE ANESTHETIST, CERTIFIED REGISTERED

## 2018-03-23 PROCEDURE — 25000132 ZZH RX MED GY IP 250 OP 250 PS 637: Performed by: STUDENT IN AN ORGANIZED HEALTH CARE EDUCATION/TRAINING PROGRAM

## 2018-03-23 PROCEDURE — 37000008 ZZH ANESTHESIA TECHNICAL FEE, 1ST 30 MIN: Performed by: NEUROLOGICAL SURGERY

## 2018-03-23 PROCEDURE — 25000125 ZZHC RX 250: Performed by: NEUROLOGICAL SURGERY

## 2018-03-23 PROCEDURE — 93010 ELECTROCARDIOGRAM REPORT: CPT | Performed by: INTERNAL MEDICINE

## 2018-03-23 PROCEDURE — 25000128 H RX IP 250 OP 636: Performed by: NEUROLOGICAL SURGERY

## 2018-03-23 PROCEDURE — 87641 MR-STAPH DNA AMP PROBE: CPT | Performed by: NEUROLOGICAL SURGERY

## 2018-03-23 PROCEDURE — 36000076 ZZH SURGERY LEVEL 6 EA 15 ADDTL MIN - UMMC: Performed by: NEUROLOGICAL SURGERY

## 2018-03-23 PROCEDURE — 85610 PROTHROMBIN TIME: CPT | Performed by: NEUROLOGICAL SURGERY

## 2018-03-23 PROCEDURE — 00000146 ZZHCL STATISTIC GLUCOSE BY METER IP

## 2018-03-23 PROCEDURE — 71000015 ZZH RECOVERY PHASE 1 LEVEL 2 EA ADDTL HR: Performed by: NEUROLOGICAL SURGERY

## 2018-03-23 PROCEDURE — 80048 BASIC METABOLIC PNL TOTAL CA: CPT | Performed by: NEUROLOGICAL SURGERY

## 2018-03-23 PROCEDURE — 70450 CT HEAD/BRAIN W/O DYE: CPT

## 2018-03-23 PROCEDURE — 8E09XBG COMPUTER ASSISTED PROCEDURE OF HEAD AND NECK REGION, WITH COMPUTERIZED TOMOGRAPHY: ICD-10-PCS | Performed by: NEUROLOGICAL SURGERY

## 2018-03-23 PROCEDURE — 36415 COLL VENOUS BLD VENIPUNCTURE: CPT | Performed by: NEUROLOGICAL SURGERY

## 2018-03-23 PROCEDURE — 85730 THROMBOPLASTIN TIME PARTIAL: CPT | Performed by: NEUROLOGICAL SURGERY

## 2018-03-23 PROCEDURE — 70250 X-RAY EXAM OF SKULL: CPT

## 2018-03-23 PROCEDURE — 40000170 ZZH STATISTIC PRE-PROCEDURE ASSESSMENT II: Performed by: NEUROLOGICAL SURGERY

## 2018-03-23 PROCEDURE — 8E09XBF COMPUTER ASSISTED PROCEDURE OF HEAD AND NECK REGION, WITH FLUOROSCOPY: ICD-10-PCS | Performed by: NEUROLOGICAL SURGERY

## 2018-03-23 PROCEDURE — 25000128 H RX IP 250 OP 636: Performed by: STUDENT IN AN ORGANIZED HEALTH CARE EDUCATION/TRAINING PROGRAM

## 2018-03-23 PROCEDURE — 25000125 ZZHC RX 250: Performed by: STUDENT IN AN ORGANIZED HEALTH CARE EDUCATION/TRAINING PROGRAM

## 2018-03-23 PROCEDURE — 25000128 H RX IP 250 OP 636: Performed by: ANESTHESIOLOGY

## 2018-03-23 PROCEDURE — 40000277 XR SURGERY CARM FLUORO LESS THAN 5 MIN W STILLS: Mod: TC

## 2018-03-23 DEVICE — IMPLANTABLE DEVICE: Type: IMPLANTABLE DEVICE | Site: SKULL | Status: FUNCTIONAL

## 2018-03-23 RX ORDER — HYDRALAZINE HYDROCHLORIDE 20 MG/ML
10-20 INJECTION INTRAMUSCULAR; INTRAVENOUS EVERY 30 MIN PRN
Status: DISCONTINUED | OUTPATIENT
Start: 2018-03-23 | End: 2018-03-24 | Stop reason: HOSPADM

## 2018-03-23 RX ORDER — CLINDAMYCIN PHOSPHATE 900 MG/50ML
900 INJECTION, SOLUTION INTRAVENOUS SEE ADMIN INSTRUCTIONS
Status: DISCONTINUED | OUTPATIENT
Start: 2018-03-23 | End: 2018-03-23 | Stop reason: HOSPADM

## 2018-03-23 RX ORDER — GABAPENTIN 600 MG/1
600 TABLET ORAL 3 TIMES DAILY
Status: DISCONTINUED | OUTPATIENT
Start: 2018-03-23 | End: 2018-03-24 | Stop reason: HOSPADM

## 2018-03-23 RX ORDER — ONDANSETRON 4 MG/1
4 TABLET, ORALLY DISINTEGRATING ORAL EVERY 30 MIN PRN
Status: DISCONTINUED | OUTPATIENT
Start: 2018-03-23 | End: 2018-03-23 | Stop reason: HOSPADM

## 2018-03-23 RX ORDER — POTASSIUM CHLORIDE 750 MG/1
20-40 TABLET, EXTENDED RELEASE ORAL
Status: DISCONTINUED | OUTPATIENT
Start: 2018-03-23 | End: 2018-03-24 | Stop reason: HOSPADM

## 2018-03-23 RX ORDER — PRIMIDONE 250 MG/1
500 TABLET ORAL EVERY EVENING
Status: DISCONTINUED | OUTPATIENT
Start: 2018-03-23 | End: 2018-03-24 | Stop reason: HOSPADM

## 2018-03-23 RX ORDER — LIDOCAINE HYDROCHLORIDE AND EPINEPHRINE 10; 10 MG/ML; UG/ML
INJECTION, SOLUTION INFILTRATION; PERINEURAL PRN
Status: DISCONTINUED | OUTPATIENT
Start: 2018-03-23 | End: 2018-03-23

## 2018-03-23 RX ORDER — ALBUTEROL SULFATE 90 UG/1
2 AEROSOL, METERED RESPIRATORY (INHALATION) EVERY 6 HOURS
Status: DISCONTINUED | OUTPATIENT
Start: 2018-03-23 | End: 2018-03-24 | Stop reason: HOSPADM

## 2018-03-23 RX ORDER — ONDANSETRON 2 MG/ML
INJECTION INTRAMUSCULAR; INTRAVENOUS PRN
Status: DISCONTINUED | OUTPATIENT
Start: 2018-03-23 | End: 2018-03-23

## 2018-03-23 RX ORDER — LIDOCAINE HYDROCHLORIDE 20 MG/ML
INJECTION, SOLUTION INFILTRATION; PERINEURAL PRN
Status: DISCONTINUED | OUTPATIENT
Start: 2018-03-23 | End: 2018-03-23

## 2018-03-23 RX ORDER — SODIUM CHLORIDE, SODIUM LACTATE, POTASSIUM CHLORIDE, CALCIUM CHLORIDE 600; 310; 30; 20 MG/100ML; MG/100ML; MG/100ML; MG/100ML
INJECTION, SOLUTION INTRAVENOUS CONTINUOUS
Status: DISCONTINUED | OUTPATIENT
Start: 2018-03-23 | End: 2018-03-23 | Stop reason: HOSPADM

## 2018-03-23 RX ORDER — LIDOCAINE 40 MG/G
CREAM TOPICAL
Status: DISCONTINUED | OUTPATIENT
Start: 2018-03-23 | End: 2018-03-24 | Stop reason: HOSPADM

## 2018-03-23 RX ORDER — SODIUM CHLORIDE 9 MG/ML
INJECTION, SOLUTION INTRAVENOUS CONTINUOUS
Status: DISPENSED | OUTPATIENT
Start: 2018-03-23 | End: 2018-03-24

## 2018-03-23 RX ORDER — ONDANSETRON 4 MG/1
4-8 TABLET, ORALLY DISINTEGRATING ORAL EVERY 6 HOURS PRN
Status: DISCONTINUED | OUTPATIENT
Start: 2018-03-23 | End: 2018-03-24 | Stop reason: HOSPADM

## 2018-03-23 RX ORDER — ZINC ACETATE ANHYDROUS AND ZINC GLUCONATE 2; 1 [HP_X]/1; [HP_X]/1
1 TABLET ORAL 2 TIMES DAILY
Status: DISCONTINUED | OUTPATIENT
Start: 2018-03-23 | End: 2018-03-23 | Stop reason: CLARIF

## 2018-03-23 RX ORDER — PRIMIDONE 250 MG/1
250 TABLET ORAL EVERY MORNING
Status: DISCONTINUED | OUTPATIENT
Start: 2018-03-24 | End: 2018-03-24 | Stop reason: HOSPADM

## 2018-03-23 RX ORDER — SODIUM CHLORIDE, SODIUM LACTATE, POTASSIUM CHLORIDE, CALCIUM CHLORIDE 600; 310; 30; 20 MG/100ML; MG/100ML; MG/100ML; MG/100ML
INJECTION, SOLUTION INTRAVENOUS CONTINUOUS PRN
Status: DISCONTINUED | OUTPATIENT
Start: 2018-03-23 | End: 2018-03-23

## 2018-03-23 RX ORDER — POTASSIUM CHLORIDE 29.8 MG/ML
20 INJECTION INTRAVENOUS
Status: DISCONTINUED | OUTPATIENT
Start: 2018-03-23 | End: 2018-03-24 | Stop reason: HOSPADM

## 2018-03-23 RX ORDER — MULTIVITAMIN,THERAPEUTIC
1 TABLET ORAL DAILY
Status: DISCONTINUED | OUTPATIENT
Start: 2018-03-23 | End: 2018-03-24 | Stop reason: HOSPADM

## 2018-03-23 RX ORDER — OXYCODONE HYDROCHLORIDE 5 MG/1
5-10 TABLET ORAL
Status: DISCONTINUED | OUTPATIENT
Start: 2018-03-23 | End: 2018-03-24 | Stop reason: HOSPADM

## 2018-03-23 RX ORDER — POTASSIUM CHLORIDE 1.5 G/1.58G
20-40 POWDER, FOR SOLUTION ORAL
Status: DISCONTINUED | OUTPATIENT
Start: 2018-03-23 | End: 2018-03-24 | Stop reason: HOSPADM

## 2018-03-23 RX ORDER — MAGNESIUM SULFATE HEPTAHYDRATE 40 MG/ML
4 INJECTION, SOLUTION INTRAVENOUS EVERY 4 HOURS PRN
Status: DISCONTINUED | OUTPATIENT
Start: 2018-03-23 | End: 2018-03-24 | Stop reason: HOSPADM

## 2018-03-23 RX ORDER — LIDOCAINE 40 MG/G
CREAM TOPICAL
Status: DISCONTINUED | OUTPATIENT
Start: 2018-03-23 | End: 2018-03-23 | Stop reason: HOSPADM

## 2018-03-23 RX ORDER — EPHEDRINE SULFATE 50 MG/ML
INJECTION, SOLUTION INTRAMUSCULAR; INTRAVENOUS; SUBCUTANEOUS PRN
Status: DISCONTINUED | OUTPATIENT
Start: 2018-03-23 | End: 2018-03-23

## 2018-03-23 RX ORDER — CALCIUM CARBONATE 500(1250)
1250 TABLET ORAL DAILY
Status: DISCONTINUED | OUTPATIENT
Start: 2018-03-23 | End: 2018-03-24 | Stop reason: HOSPADM

## 2018-03-23 RX ORDER — UREA 10 %
2500 LOTION (ML) TOPICAL DAILY
Status: DISCONTINUED | OUTPATIENT
Start: 2018-03-23 | End: 2018-03-24 | Stop reason: HOSPADM

## 2018-03-23 RX ORDER — LABETALOL HYDROCHLORIDE 5 MG/ML
10-40 INJECTION, SOLUTION INTRAVENOUS EVERY 10 MIN PRN
Status: DISCONTINUED | OUTPATIENT
Start: 2018-03-23 | End: 2018-03-24 | Stop reason: HOSPADM

## 2018-03-23 RX ORDER — ACETAMINOPHEN 325 MG/1
650 TABLET ORAL EVERY 4 HOURS PRN
Status: DISCONTINUED | OUTPATIENT
Start: 2018-03-26 | End: 2018-03-24 | Stop reason: HOSPADM

## 2018-03-23 RX ORDER — POLYETHYLENE GLYCOL 3350 17 G/17G
17 POWDER, FOR SOLUTION ORAL DAILY
Status: DISCONTINUED | OUTPATIENT
Start: 2018-03-23 | End: 2018-03-24 | Stop reason: HOSPADM

## 2018-03-23 RX ORDER — CLINDAMYCIN PHOSPHATE 900 MG/50ML
900 INJECTION, SOLUTION INTRAVENOUS EVERY 8 HOURS
Status: DISCONTINUED | OUTPATIENT
Start: 2018-03-23 | End: 2018-03-24 | Stop reason: HOSPADM

## 2018-03-23 RX ORDER — ACETAMINOPHEN 325 MG/1
975 TABLET ORAL EVERY 8 HOURS
Status: DISCONTINUED | OUTPATIENT
Start: 2018-03-23 | End: 2018-03-24 | Stop reason: HOSPADM

## 2018-03-23 RX ORDER — PROPOFOL 10 MG/ML
INJECTION, EMULSION INTRAVENOUS PRN
Status: DISCONTINUED | OUTPATIENT
Start: 2018-03-23 | End: 2018-03-23

## 2018-03-23 RX ORDER — NALOXONE HYDROCHLORIDE 0.4 MG/ML
.1-.4 INJECTION, SOLUTION INTRAMUSCULAR; INTRAVENOUS; SUBCUTANEOUS
Status: DISCONTINUED | OUTPATIENT
Start: 2018-03-23 | End: 2018-03-23

## 2018-03-23 RX ORDER — FENTANYL CITRATE 50 UG/ML
25-50 INJECTION, SOLUTION INTRAMUSCULAR; INTRAVENOUS EVERY 10 MIN PRN
Status: DISCONTINUED | OUTPATIENT
Start: 2018-03-23 | End: 2018-03-23 | Stop reason: HOSPADM

## 2018-03-23 RX ORDER — DEXAMETHASONE SODIUM PHOSPHATE 4 MG/ML
INJECTION, SOLUTION INTRA-ARTICULAR; INTRALESIONAL; INTRAMUSCULAR; INTRAVENOUS; SOFT TISSUE PRN
Status: DISCONTINUED | OUTPATIENT
Start: 2018-03-23 | End: 2018-03-23

## 2018-03-23 RX ORDER — POTASSIUM CL/LIDO/0.9 % NACL 10MEQ/0.1L
10 INTRAVENOUS SOLUTION, PIGGYBACK (ML) INTRAVENOUS
Status: DISCONTINUED | OUTPATIENT
Start: 2018-03-23 | End: 2018-03-24 | Stop reason: HOSPADM

## 2018-03-23 RX ORDER — ONDANSETRON 2 MG/ML
4 INJECTION INTRAMUSCULAR; INTRAVENOUS EVERY 30 MIN PRN
Status: DISCONTINUED | OUTPATIENT
Start: 2018-03-23 | End: 2018-03-23 | Stop reason: HOSPADM

## 2018-03-23 RX ORDER — PROCHLORPERAZINE MALEATE 5 MG
10 TABLET ORAL EVERY 6 HOURS PRN
Status: DISCONTINUED | OUTPATIENT
Start: 2018-03-23 | End: 2018-03-24 | Stop reason: HOSPADM

## 2018-03-23 RX ORDER — AMOXICILLIN 250 MG
2 CAPSULE ORAL 2 TIMES DAILY
Status: DISCONTINUED | OUTPATIENT
Start: 2018-03-23 | End: 2018-03-24 | Stop reason: HOSPADM

## 2018-03-23 RX ORDER — POTASSIUM CHLORIDE 7.45 MG/ML
10 INJECTION INTRAVENOUS
Status: DISCONTINUED | OUTPATIENT
Start: 2018-03-23 | End: 2018-03-24 | Stop reason: HOSPADM

## 2018-03-23 RX ORDER — NALOXONE HYDROCHLORIDE 0.4 MG/ML
.1-.4 INJECTION, SOLUTION INTRAMUSCULAR; INTRAVENOUS; SUBCUTANEOUS
Status: DISCONTINUED | OUTPATIENT
Start: 2018-03-23 | End: 2018-03-24 | Stop reason: HOSPADM

## 2018-03-23 RX ORDER — ONDANSETRON 2 MG/ML
4-8 INJECTION INTRAMUSCULAR; INTRAVENOUS EVERY 6 HOURS PRN
Status: DISCONTINUED | OUTPATIENT
Start: 2018-03-23 | End: 2018-03-24 | Stop reason: HOSPADM

## 2018-03-23 RX ORDER — PROPOFOL 10 MG/ML
INJECTION, EMULSION INTRAVENOUS CONTINUOUS PRN
Status: DISCONTINUED | OUTPATIENT
Start: 2018-03-23 | End: 2018-03-23

## 2018-03-23 RX ORDER — CLINDAMYCIN PHOSPHATE 900 MG/50ML
900 INJECTION, SOLUTION INTRAVENOUS
Status: COMPLETED | OUTPATIENT
Start: 2018-03-23 | End: 2018-03-23

## 2018-03-23 RX ORDER — LANOLIN ALCOHOL/MO/W.PET/CERES
400 CREAM (GRAM) TOPICAL DAILY
Status: DISCONTINUED | OUTPATIENT
Start: 2018-03-23 | End: 2018-03-24 | Stop reason: HOSPADM

## 2018-03-23 RX ADMIN — ONDANSETRON 4 MG: 2 INJECTION INTRAMUSCULAR; INTRAVENOUS at 09:19

## 2018-03-23 RX ADMIN — PROPOFOL 20 MG: 10 INJECTION, EMULSION INTRAVENOUS at 13:47

## 2018-03-23 RX ADMIN — PROPOFOL 10 MG: 10 INJECTION, EMULSION INTRAVENOUS at 09:27

## 2018-03-23 RX ADMIN — ONDANSETRON 4 MG: 2 INJECTION INTRAMUSCULAR; INTRAVENOUS at 18:58

## 2018-03-23 RX ADMIN — PROPOFOL 10 MG: 10 INJECTION, EMULSION INTRAVENOUS at 09:26

## 2018-03-23 RX ADMIN — PROPOFOL 30 MCG/KG/MIN: 10 INJECTION, EMULSION INTRAVENOUS at 10:41

## 2018-03-23 RX ADMIN — PROPOFOL 30 MG: 10 INJECTION, EMULSION INTRAVENOUS at 10:23

## 2018-03-23 RX ADMIN — PROPOFOL 30 MG: 10 INJECTION, EMULSION INTRAVENOUS at 10:16

## 2018-03-23 RX ADMIN — FENTANYL CITRATE 25 MCG: 50 INJECTION INTRAMUSCULAR; INTRAVENOUS at 15:09

## 2018-03-23 RX ADMIN — SODIUM CHLORIDE, POTASSIUM CHLORIDE, SODIUM LACTATE AND CALCIUM CHLORIDE: 600; 310; 30; 20 INJECTION, SOLUTION INTRAVENOUS at 09:05

## 2018-03-23 RX ADMIN — SODIUM CHLORIDE: 9 INJECTION, SOLUTION INTRAVENOUS at 15:34

## 2018-03-23 RX ADMIN — PROPOFOL 40 MG: 10 INJECTION, EMULSION INTRAVENOUS at 10:20

## 2018-03-23 RX ADMIN — SODIUM CHLORIDE, POTASSIUM CHLORIDE, SODIUM LACTATE AND CALCIUM CHLORIDE: 600; 310; 30; 20 INJECTION, SOLUTION INTRAVENOUS at 12:25

## 2018-03-23 RX ADMIN — PROPOFOL 20 MG: 10 INJECTION, EMULSION INTRAVENOUS at 10:18

## 2018-03-23 RX ADMIN — DEXMEDETOMIDINE HYDROCHLORIDE 0.8 MCG/KG/HR: 100 INJECTION, SOLUTION INTRAVENOUS at 10:09

## 2018-03-23 RX ADMIN — PROCHLORPERAZINE EDISYLATE 10 MG: 5 INJECTION INTRAMUSCULAR; INTRAVENOUS at 21:07

## 2018-03-23 RX ADMIN — FENTANYL CITRATE 50 MCG: 50 INJECTION INTRAMUSCULAR; INTRAVENOUS at 15:33

## 2018-03-23 RX ADMIN — ACETAMINOPHEN 975 MG: 325 TABLET, FILM COATED ORAL at 18:02

## 2018-03-23 RX ADMIN — PROPOFOL 40 MG: 10 INJECTION, EMULSION INTRAVENOUS at 09:25

## 2018-03-23 RX ADMIN — PROPOFOL 10 MG: 10 INJECTION, EMULSION INTRAVENOUS at 09:32

## 2018-03-23 RX ADMIN — DEXAMETHASONE SODIUM PHOSPHATE 6 MG: 4 INJECTION, SOLUTION INTRA-ARTICULAR; INTRALESIONAL; INTRAMUSCULAR; INTRAVENOUS; SOFT TISSUE at 09:19

## 2018-03-23 RX ADMIN — PROPOFOL 30 MG: 10 INJECTION, EMULSION INTRAVENOUS at 09:33

## 2018-03-23 RX ADMIN — Medication 5 MG: at 11:10

## 2018-03-23 RX ADMIN — Medication 5 MG: at 11:00

## 2018-03-23 RX ADMIN — LIDOCAINE HYDROCHLORIDE 20 MG: 20 INJECTION, SOLUTION INFILTRATION; PERINEURAL at 09:20

## 2018-03-23 RX ADMIN — ONDANSETRON 4 MG: 4 TABLET, ORALLY DISINTEGRATING ORAL at 17:53

## 2018-03-23 RX ADMIN — CLINDAMYCIN PHOSPHATE 900 MG: 18 INJECTION, SOLUTION INTRAVENOUS at 18:02

## 2018-03-23 RX ADMIN — PROPOFOL 10 MG: 10 INJECTION, EMULSION INTRAVENOUS at 09:31

## 2018-03-23 RX ADMIN — CLINDAMYCIN PHOSPHATE 900 MG: 18 INJECTION, SOLUTION INTRAVENOUS at 10:50

## 2018-03-23 RX ADMIN — PROPOFOL 10 MG: 10 INJECTION, EMULSION INTRAVENOUS at 13:51

## 2018-03-23 RX ADMIN — PROPOFOL 20 MG: 10 INJECTION, EMULSION INTRAVENOUS at 09:28

## 2018-03-23 RX ADMIN — PROPOFOL 20 MG: 10 INJECTION, EMULSION INTRAVENOUS at 09:30

## 2018-03-23 ASSESSMENT — VISUAL ACUITY
OU: NORMAL ACUITY

## 2018-03-23 ASSESSMENT — PAIN DESCRIPTION - DESCRIPTORS: DESCRIPTORS: SORE

## 2018-03-23 NOTE — PLAN OF CARE
Problem: Patient Care Overview  Goal: Plan of Care/Patient Progress Review  Pt is admitted today 3/23/18  to 4A  S/P right DBS;  AAOX4;  PERRLA; baseline R hand tremors; prn Oxycodone and Acetaminophen given for incisional and headache; pt was premedicated with ODT Zofran due to c/o nausea after pain meds per pt; aguilera in place but needs to be removed once pt gets out of bed tonight; MIVF at 100ml/hr; head incision intact with sutures and MAKAYLA; continue to monitor and with POC; update MD with changes.

## 2018-03-23 NOTE — IP AVS SNAPSHOT
MRN:[id number]                      After Visit Summary   3/23/2018    Hilary Cornelius    MRN: [id number]           Thank you!     Thank you for choosing [facility] for your care. Our goal is always to provide you with excellent care. Hearing back from our patients is one way we can continue to improve our services. Please take a few minutes to complete the written survey that you may receive in the mail after you visit with us. Thank you!        Patient Information     Date Of Birth          1955        Designated Caregiver       Most Recent Value    Caregiver    Will someone help with your care after discharge? yes    Name of designated caregiver Adilson ()    Phone number of caregiver [phone]    Caregiver address [address] [place], [place] [id number]      About your hospital stay     You were admitted on:  March 23, 2018 You last received care in the:  [address] [facility]    You were discharged on:  March 24, 2018        Reason for your hospital stay       S/p DBS                  Who to Call     For medical emergencies, please call 911.  For non-urgent questions about your medical care, please call your primary care provider or clinic, [phone]  For questions related to your surgery, please call your surgery clinic        Attending Provider     Provider Henok Alarcon MD Neurosurgery       Primary Care Provider Office Phone # Fax #    Zachary Holguin [phone]      After Care Instructions     Activity       Your activity upon discharge: activity as tolerated            Diet       Follow this diet upon discharge: regular diet                  Follow-up Appointments     Adult [facility] Follow-up and recommended labs and tests       Follow up with Dr. Javier , at (location with clinic name or city) [facility], within 2 weeks for wound check.    Follow up with Neurology as scheduled.      Appointments on [place] and/or [place] (with [facility] or [facility] provider or service). Call [phone] if you haven't heard regarding these appointments within 7 days of discharge.                  Your next 10 appointments already scheduled     Mar 30, 2018   Procedure with Henok Javier MD   [facility], [place], Same Day Surgery (--)    [address] [facility] [address] [phone]            Apr 27, 2018 12:40 PM CDT   CT HEAD W/O CONTRAST with UCCT1   [facility] [place] [facility] ([facility])    [address] [address] [phone]           Please bring any scans or X-rays taken at other hospitals, if similar tests were done. Also bring a list of your medicines, including vitamins, minerals and over-the-counter drugs. It is safest to leave personal items at home.  Be sure to tell your doctor:   If you have any allergies.   If there s any chance you are pregnant.   If you are breastfeeding.  You do not need to do anything special to prepare for this exam.  Please wear loose clothing, such as a sweat suit or jogging clothes. Avoid snaps, zippers and other metal. We may ask you to undress and put on a hospital gown.            Apr 27, 2018  1:30 PM CDT   (Arrive by 1:15 PM)   Return Movement Disorder with Randell Flanagan MD   [facility] Neurology ([facility])    [address] [place] [address] [phone]              Future tests that were ordered for you     Basic metabolic panel           CBC with platelets       Last Lab Result: Hemoglobin (g/dL)       Date                     Value                 03/15/2017               12.1             ----------            INR           Partial thromboplastin time                 Further instructions from your care team       You underwent surgery place a  deep brain stimulator  by Henok Javier MD, PhD      - Your sutures are absorbable.     - Follow up for Phase II surgery.     -  If you have not heard from our clinic about your follow up visit by 3-4 days following your discharge, please call our clinic at [phone] to schedule an appointment with the Neurosurgery teams.     After discharge, your activity restrictions are:   -We encourage short frequent walks, increasing as tolerated.  - No driving until you are seen in clinic and cleared by your neurosurgeon.  If you have had a seizure, you may not drive for at least 3 months according to [place] law.    - No strenuous activity.  - No lifting more than 10 pounds until you are seen in clinic (a gallon of milk weighs approximately 8 pounds)    Wound care  - You are ok to shower, but do not soak your incisions. Pat them dry if they get damp.   - Avoid coloring your hair or permanent styling until cleared by your surgeon  - No baths, hot tubs or pools for 4-6 weeks after surgery.       Call if you have any of the followin. Temperature greater than 101.5 F.   2. Any redness, swelling or discharge from the wound.   3. Any new weakness, numbness or altered mental status.  4. Worsening pain that is not improving with the pain medications you were prescribed.     Call [phone] or after 5:00 pm or on weekends call [phone] and ask for the neurosurgery resident on call. Thank You.       Pending Results     Date and Time Order Name Status Description    3/23/2018 0738 EKG 12-lead, tracing only Preliminary             Statement of Approval     Ordered          18 0714  I have reviewed and agree with all the recommendations and orders detailed in this document.  EFFECTIVE NOW     Approved and electronically signed by:  Parmjit Kumar MD             Admission Information     Date & Time Provider Department Dept. Phone    3/23/2018 Henok Javier MD [address] [facility] [phone]      Your Vitals Were     Blood Pressure Pulse Temperature Respirations Height Weight    134/80 80 97.8  F (36.6  C) (Oral) 11  "1.676 m (5' 6\") 87.6 kg (193 lb 2 oz)    Pulse Oximetry BMI (Body Mass Index)                96% 31.17 kg/m2          [employer] Information     [employer] gives you secure access to your electronic health record. If you see a primary care provider, you can also send messages to your care team and make appointments. If you have questions, please call your primary care clinic.  If you do not have a primary care provider, please call [phone] and they will assist you.        Care EveryWhere ID     This is your Care EveryWhere ID. This could be used by other organizations to access your [facility] medical records  [id number]        Equal Access to Services     JANIS TABARES : Jimi Forbes, martin macdonald, harsh hinkle, ze fay. So [place] [phone].    ATENCIÓN: Si habla español, tiene a paz disposición servicios gratuitos de asistencia lingüística. Llame al [phone].    We comply with applicable federal civil rights laws and [place] laws. We do not discriminate on the basis of race, color, national origin, age, disability, sex, sexual orientation, or gender identity.               Review of your medicines      START taking        Dose / Directions    oxyCODONE IR 5 MG tablet   Commonly known as:  ROXICODONE   Used for:  S/P deep brain stimulator placement        Dose:  5 mg   Take 1 tablet (5 mg) by mouth every 3 hours as needed for other (pain control or improvement in physical function. Hold dose for analgesic side effects.)   Quantity:  56 tablet   Refills:  0       senna-docusate 8.6-50 MG per tablet   Commonly known as:  SENOKOT-S;PERICOLACE   Used for:  S/P deep brain stimulator placement        Dose:  2 tablet   Take 2 tablets by mouth 2 times daily   Quantity:  100 tablet   Refills:  0         CONTINUE these medicines which have NOT CHANGED        Dose / Directions    acetaminophen 325 MG tablet   Commonly known as:  TYLENOL   Used for:  " Essential tremor        Dose:  650 mg   Take 2 tablets (650 mg) by mouth every 4 hours as needed for other (surgical pain)   Quantity:  100 tablet   Refills:  0       albuterol 108 (90 BASE) MCG/ACT Inhaler   Commonly known as:  PROAIR HFA/PROVENTIL HFA/VENTOLIN HFA        Dose:  2 puff   Inhale 2 puffs into the lungs every 6 hours   Refills:  0       calcium carbonate 600 MG tablet   Commonly known as:  OS-GISELA 600 mg [place]. Ca        Dose:  600 mg   Take 600 mg by mouth daily   Refills:  0       cyanocobalamin 2500 MCG sublingual tablet   Commonly known as:  VITAMIN B-12        Dose:  2500 mcg   Place 2,500 mcg under the tongue daily   Refills:  0       ferrous fumarate 65 mg ([place]. FE)-Vitamin C 125 mg  MG Tabs tablet   Commonly known as:  VITRON C   Used for:  Essential tremor        Dose:  1 tablet   Take 1 tablet by mouth daily   Refills:  0       folic acid 400 MCG tablet   Commonly known as:  FOLVITE        Dose:  400 mcg   Take 400 mcg by mouth daily   Refills:  0       gabapentin 600 MG tablet   Commonly known as:  NEURONTIN   Used for:  Chronic midline low back pain without sciatica        Dose:  600 mg   Take 1 tablet (600 mg) by mouth 3 times daily   Quantity:  270 tablet   Refills:  3       Multiple vitamin Tabs        Dose:  1 tablet   Take 1 tablet by mouth daily   Refills:  0       primidone 250 MG tablet   Commonly known as:  MYSOLINE        1 in am and 2 in the evening   Refills:  0       TYLENOL PM EXTRA STRENGTH  MG tablet   Used for:  Essential tremor   Generic drug:  diphenhydrAMINE-acetaminophen        Dose:  2 tablet   Take 2 tablets by mouth nightly as needed for sleep   Refills:  0       ZICAM COLD REMEDY Tbdp   Used for:  Essential tremor        Dose:  1 tablet   Take 1 tablet by mouth 2 times daily   Refills:  0            Where to get your medicines      Some of these will need a paper prescription and others can be bought over the counter. Ask your nurse if you have  questions.     Bring a paper prescription for each of these medications     oxyCODONE IR 5 MG tablet    senna-docusate 8.6-50 MG per tablet                Protect others around you: Learn how to safely use, store and throw away your medicines at www.disposemymeds.org.        Information about OPIOIDS     PRESCRIPTION OPIOIDS: WHAT YOU NEED TO KNOW    Prescription opioids can be used to help relieve moderate to severe pain and are often prescribed following a surgery or injury, or for certain health conditions. These medications can be an important part of treatment but also come with serious risks. It is important to work with your health care provider to make sure you are getting the safest, most effective care.    WHAT ARE THE RISKS AND SIDE EFFECTS OF OPIOID USE?  Prescription opioids carry serious risks of addiction and overdose, especially with prolonged use. An opioid overdose, often marked by slowed breathing can cause sudden death. The use of prescription opioids can have a number of side effects as well, even when taken as directed:      Tolerance - meaning you might need to take more of a medication for the same pain relief    Physical dependence - meaning you have symptoms of withdrawal when a medication is stopped    Increased sensitivity to pain    Constipation    Nausea, vomiting, and dry mouth    Sleepiness and dizziness    Confusion    Depression    Low levels of testosterone that can result in lower sex drive, energy, and strength    Itching and sweating    RISKS ARE GREATER WITH:    History of drug misuse, substance use disorder, or overdose    Mental health conditions (such as depression or anxiety)    Sleep apnea    Older age (65 years or older)    Pregnancy    Avoid alcohol while taking prescription opioids.   Also, unless specifically advised by your health care provider, medications to avoid include:    Benzodiazepines (such as Xanax or Valium)    Muscle relaxants (such as Soma or  Flexeril)    Hypnotics (such as Ambien or Lunesta)    Other prescription opioids    KNOW YOUR OPTIONS:  Talk to your health care provider about ways to manage your pain that do not involve prescription opioids. Some of these options may actually work better and have fewer risks and side effects:    Pain relievers such as acetaminophen, ibuprofen, and naproxen    Some medications that are also used for depression or seizures    Physical therapy and exercise    Cognitive behavioral therapy, a psychological, goal-directed approach, in which patients learn how to modify physical, behavioral, and emotional triggers of pain and stress    IF YOU ARE PRESCRIBED OPIOIDS FOR PAIN:    Never take opioids in greater amounts or more often than prescribed    Follow up with your primary health care provider and work together to create a plan on how to manage your pain.    Talk about ways to help manage your pain that do not involve prescription opioids    Talk about all concerns and side effects    Help prevent misuse and abuse    Never sell or share prescription opioids    Never use another person's prescription opioids    Store prescription opioids in a secure place and out of reach of others (this may include visitors, children, friends, and family)    Visit www.cdc.gov/drugoverdose to learn about risks of opioid abuse and overdose    If you believe you may be struggling with addiction, tell your health care provider and ask for guidance or call [facility]'s National Helpline at 0-294-600-HELP    LEARN MORE / www.cdc.gov/drugoverdose/prescribing/guideline.html    Safely dispose of unused prescription opioids: Find your local drug take-back programs and more information about the importance of safe disposal at www.doseofreality.[place].gov             Medication List: This is a list of all your medications and when to take them. Check marks below indicate your daily home schedule. Keep this list as a reference.      Medications            Morning Afternoon Evening Bedtime As Needed    acetaminophen 325 MG tablet   Commonly known as:  TYLENOL   Take 2 tablets (650 mg) by mouth every 4 hours as needed for other (surgical pain)   Last time this was given:  975 mg on 3/24/2018  8:00 AM                                albuterol 108 (90 BASE) MCG/ACT Inhaler   Commonly known as:  PROAIR HFA/PROVENTIL HFA/VENTOLIN HFA   Inhale 2 puffs into the lungs every 6 hours                                calcium carbonate 600 MG tablet   Commonly known as:  OS-GISELA 600 mg [place]. Ca   Take 600 mg by mouth daily                                cyanocobalamin 2500 MCG sublingual tablet   Commonly known as:  VITAMIN B-12   Place 2,500 mcg under the tongue daily                                ferrous fumarate 65 mg ([place]. FE)-Vitamin C 125 mg  MG Tabs tablet   Commonly known as:  VITRON C   Take 1 tablet by mouth daily                                folic acid 400 MCG tablet   Commonly known as:  FOLVITE   Take 400 mcg by mouth daily                                gabapentin 600 MG tablet   Commonly known as:  NEURONTIN   Take 1 tablet (600 mg) by mouth 3 times daily   Last time this was given:  600 mg on 3/24/2018  7:56 AM                                Multiple vitamin Tabs   Take 1 tablet by mouth daily                                oxyCODONE IR 5 MG tablet   Commonly known as:  ROXICODONE   Take 1 tablet (5 mg) by mouth every 3 hours as needed for other (pain control or improvement in physical function. Hold dose for analgesic side effects.)                                primidone 250 MG tablet   Commonly known as:  MYSOLINE   1 in am and 2 in the evening   Last time this was given:  250 mg on 3/24/2018  8:13 AM                                senna-docusate 8.6-50 MG per tablet   Commonly known as:  SENOKOT-S;PERICOLACE   Take 2 tablets by mouth 2 times daily                                TYLENOL PM EXTRA STRENGTH  MG tablet   Take 2 tablets by mouth nightly  as needed for sleep   Generic drug:  diphenhydrAMINE-acetaminophen                                ZICAM COLD REMEDY Tbdp   Take 1 tablet by mouth 2 times daily

## 2018-03-23 NOTE — OR NURSING
Paged Dr. Carlos to ask if the skull XRs as ordered in time orders for 1400 are to be done in PACU.

## 2018-03-23 NOTE — ANESTHESIA CARE TRANSFER NOTE
Patient: Hilary Cornelius    Procedure(s):  Stealth Assisted Right Deep Brain Stimulator Placement, Phase I, Placement Right Side Deep Brain Stimulator Electrode, Target Right Ventral Intermediate Nucleus Of The Thalamus With Microelectrode Recording - Wound Class: I-Clean    Diagnosis: Essential Tremor   Diagnosis Additional Information: No value filed.    Anesthesia Type:   MAC     Note:  Airway :Room Air  Patient transferred to:PACU  Handoff Report: Identifed the Patient, Identified the Reponsible Provider, Reviewed the pertinent medical history, Discussed the surgical course, Reviewed Intra-OP anesthesia mangement and issues during anesthesia, Set expectations for post-procedure period and Allowed opportunity for questions and acknowledgement of understanding      Vitals: (Last set prior to Anesthesia Care Transfer)    CRNA VITALS  3/23/2018 0913 - 3/23/2018 1013      3/23/2018             Pulse: 87    Ht Rate: 84    Temp 2: 36  C (96.8  F)    SpO2: 98 %      CRNA VITALS  3/23/2018 1412 - 3/23/2018 1446      3/23/2018             Resp Rate (observed): 15    EKG: Sinus rhythm                Electronically Signed By: LUDWIG Jones CRNA  March 23, 2018  2:46 PM

## 2018-03-23 NOTE — BRIEF OP NOTE
[facility] [place]    Brief Operative Note    Pre-operative diagnosis: Essential Tremor   Post-operative diagnosis Essential Tremor  Procedure: Procedure(s):  Stealth Assisted Right Deep Brain Stimulator Placement, Phase I, Placement Right Side Deep Brain Stimulator Electrode, Target Right Ventral Intermediate Nucleus Of The Thalamus With Microelectrode Recording - Wound Class: I-Clean  Surgeon: Surgeon(s) and Role:     * Henok Javier MD - Primary     * Randell Hubbard MD - Resident - Assisting  Anesthesia: Combined MAC with Local   Estimated blood loss: 5 cc  Drains: None  Specimens: * No specimens in log *  Findings:   None.  Complications: None.  Implants: Right [employer] Electrode placed in the VIM 3.5mm below target.       PACU --> 4A  Stealth HCT and AP/Lateral skull XR  Perioperative Ancef

## 2018-03-23 NOTE — PROGRESS NOTES
"SPIRITUAL HEALTH SERVICES  [facility] 3C   PRE-SURGERY VISIT    Had pre-surgery visit with pt.  Provided spiritual support, prayer to \"make it through the procedure.\"  Hilary identified as \"Adventist\" and was accompanied by her \" and daughter.\"      Isha Diaz  Chaplain Resident  Pager [phone]    "

## 2018-03-23 NOTE — IP AVS SNAPSHOT
[address] [address]    Phone:  [phone]                                       After Visit Summary   3/23/2018    Hilary Cornelius    MRN: [id number]           After Visit Summary Signature Page     I have received my discharge instructions, and my questions have been answered. I have discussed any challenges I see with this plan with the nurse or doctor.    ..........................................................................................................................................  Patient/Patient Representative Signature      ..........................................................................................................................................  Patient Representative Print Name and Relationship to Patient    ..................................................               ................................................  Date                                            Time    ..........................................................................................................................................  Reviewed by Signature/Title    ...................................................              ..............................................  Date                                                            Time

## 2018-03-24 ENCOUNTER — APPOINTMENT (OUTPATIENT)
Dept: CT IMAGING | Facility: CLINIC | Age: 63
End: 2018-03-24
Attending: STUDENT IN AN ORGANIZED HEALTH CARE EDUCATION/TRAINING PROGRAM
Payer: COMMERCIAL

## 2018-03-24 VITALS
WEIGHT: 193.12 LBS | BODY MASS INDEX: 31.04 KG/M2 | TEMPERATURE: 98.1 F | HEART RATE: 80 BPM | RESPIRATION RATE: 13 BRPM | SYSTOLIC BLOOD PRESSURE: 97 MMHG | DIASTOLIC BLOOD PRESSURE: 53 MMHG | OXYGEN SATURATION: 97 % | HEIGHT: 66 IN

## 2018-03-24 LAB
ANION GAP SERPL CALCULATED.3IONS-SCNC: 6 MMOL/L (ref 3–14)
BUN SERPL-MCNC: 5 MG/DL (ref 7–30)
CALCIUM SERPL-MCNC: 8.2 MG/DL (ref 8.5–10.1)
CHLORIDE SERPL-SCNC: 106 MMOL/L (ref 94–109)
CO2 SERPL-SCNC: 27 MMOL/L (ref 20–32)
CREAT SERPL-MCNC: 0.45 MG/DL (ref 0.52–1.04)
ERYTHROCYTE [DISTWIDTH] IN BLOOD BY AUTOMATED COUNT: 13 % (ref 10–15)
GFR SERPL CREATININE-BSD FRML MDRD: >90 ML/MIN/1.7M2
GLUCOSE SERPL-MCNC: 130 MG/DL (ref 70–99)
HCT VFR BLD AUTO: 35.9 % (ref 35–47)
HGB BLD-MCNC: 11.9 G/DL (ref 11.7–15.7)
MAGNESIUM SERPL-MCNC: 1.7 MG/DL (ref 1.6–2.3)
MCH RBC QN AUTO: 30 PG (ref 26.5–33)
MCHC RBC AUTO-ENTMCNC: 33.1 G/DL (ref 31.5–36.5)
MCV RBC AUTO: 90 FL (ref 78–100)
MRSA DNA SPEC QL NAA+PROBE: NEGATIVE
PHOSPHATE SERPL-MCNC: 4.1 MG/DL (ref 2.5–4.5)
PLATELET # BLD AUTO: 156 10E9/L (ref 150–450)
POTASSIUM SERPL-SCNC: 3.6 MMOL/L (ref 3.4–5.3)
RBC # BLD AUTO: 3.97 10E12/L (ref 3.8–5.2)
SODIUM SERPL-SCNC: 138 MMOL/L (ref 133–144)
SPECIMEN SOURCE: NORMAL
WBC # BLD AUTO: 5.5 10E9/L (ref 4–11)

## 2018-03-24 PROCEDURE — 85027 COMPLETE CBC AUTOMATED: CPT | Performed by: STUDENT IN AN ORGANIZED HEALTH CARE EDUCATION/TRAINING PROGRAM

## 2018-03-24 PROCEDURE — 25000128 H RX IP 250 OP 636: Performed by: STUDENT IN AN ORGANIZED HEALTH CARE EDUCATION/TRAINING PROGRAM

## 2018-03-24 PROCEDURE — 84100 ASSAY OF PHOSPHORUS: CPT | Performed by: STUDENT IN AN ORGANIZED HEALTH CARE EDUCATION/TRAINING PROGRAM

## 2018-03-24 PROCEDURE — 36415 COLL VENOUS BLD VENIPUNCTURE: CPT | Performed by: STUDENT IN AN ORGANIZED HEALTH CARE EDUCATION/TRAINING PROGRAM

## 2018-03-24 PROCEDURE — 83735 ASSAY OF MAGNESIUM: CPT | Performed by: STUDENT IN AN ORGANIZED HEALTH CARE EDUCATION/TRAINING PROGRAM

## 2018-03-24 PROCEDURE — 70450 CT HEAD/BRAIN W/O DYE: CPT

## 2018-03-24 PROCEDURE — 80048 BASIC METABOLIC PNL TOTAL CA: CPT | Performed by: STUDENT IN AN ORGANIZED HEALTH CARE EDUCATION/TRAINING PROGRAM

## 2018-03-24 PROCEDURE — 25000125 ZZHC RX 250: Performed by: STUDENT IN AN ORGANIZED HEALTH CARE EDUCATION/TRAINING PROGRAM

## 2018-03-24 PROCEDURE — 25000132 ZZH RX MED GY IP 250 OP 250 PS 637: Performed by: STUDENT IN AN ORGANIZED HEALTH CARE EDUCATION/TRAINING PROGRAM

## 2018-03-24 RX ORDER — AMOXICILLIN 250 MG
2 CAPSULE ORAL 2 TIMES DAILY
Qty: 100 TABLET | Refills: 0 | Status: SHIPPED | OUTPATIENT
Start: 2018-03-24 | End: 2018-03-24

## 2018-03-24 RX ORDER — AMOXICILLIN 250 MG
2 CAPSULE ORAL 2 TIMES DAILY
Qty: 100 TABLET | Refills: 0 | Status: SHIPPED | OUTPATIENT
Start: 2018-03-24 | End: 2018-06-28

## 2018-03-24 RX ORDER — OXYCODONE HYDROCHLORIDE 5 MG/1
5 TABLET ORAL
Qty: 56 TABLET | Refills: 0 | Status: SHIPPED | OUTPATIENT
Start: 2018-03-24 | End: 2018-06-28

## 2018-03-24 RX ORDER — OXYCODONE HYDROCHLORIDE 5 MG/1
5 TABLET ORAL
Qty: 56 TABLET | Refills: 0 | Status: SHIPPED | OUTPATIENT
Start: 2018-03-24 | End: 2018-03-24

## 2018-03-24 RX ADMIN — ACETAMINOPHEN 975 MG: 325 TABLET, FILM COATED ORAL at 08:00

## 2018-03-24 RX ADMIN — ONDANSETRON 8 MG: 4 TABLET, ORALLY DISINTEGRATING ORAL at 08:15

## 2018-03-24 RX ADMIN — GABAPENTIN 600 MG: 600 TABLET, FILM COATED ORAL at 07:56

## 2018-03-24 RX ADMIN — PRIMIDONE 250 MG: 250 TABLET ORAL at 08:13

## 2018-03-24 RX ADMIN — CLINDAMYCIN PHOSPHATE 900 MG: 18 INJECTION, SOLUTION INTRAVENOUS at 04:17

## 2018-03-24 RX ADMIN — SODIUM CHLORIDE: 9 INJECTION, SOLUTION INTRAVENOUS at 04:15

## 2018-03-24 RX ADMIN — ACETAMINOPHEN 975 MG: 325 TABLET, FILM COATED ORAL at 01:41

## 2018-03-24 ASSESSMENT — VISUAL ACUITY
OU: NORMAL ACUITY
OU: NOT TESTABLE
OU: NORMAL ACUITY

## 2018-03-24 NOTE — DISCHARGE INSTRUCTIONS
You underwent surgery place a  deep brain stimulator  by Henok Javier MD, PhD      - Your sutures are absorbable.     - Follow up for Phase II surgery.     - If you have not heard from our clinic about your follow up visit by 3-4 days following your discharge, please call our clinic at [phone] to schedule an appointment with the Neurosurgery teams.     After discharge, your activity restrictions are:   -We encourage short frequent walks, increasing as tolerated.  - No driving until you are seen in clinic and cleared by your neurosurgeon.  If you have had a seizure, you may not drive for at least 3 months according to [place] law.    - No strenuous activity.  - No lifting more than 10 pounds until you are seen in clinic (a gallon of milk weighs approximately 8 pounds)    Wound care  - You are ok to shower, but do not soak your incisions. Pat them dry if they get damp.   - Avoid coloring your hair or permanent styling until cleared by your surgeon  - No baths, hot tubs or pools for 4-6 weeks after surgery.       Call if you have any of the followin. Temperature greater than 101.5 F.   2. Any redness, swelling or discharge from the wound.   3. Any new weakness, numbness or altered mental status.  4. Worsening pain that is not improving with the pain medications you were prescribed.     Call [phone] or after 5:00 pm or on weekends call [phone] and ask for the neurosurgery resident on call. Thank You.

## 2018-03-24 NOTE — PLAN OF CARE
Problem: Patient Care Overview  Goal: Plan of Care/Patient Progress Review  PERRL 2mm. A&O x 4. Baseline tremors, moderate. Otherwise neuro intact. Up independently, stable on feet. Voided x 1 in AM. BM x 2, soft. R head incision WDL, no edema, erythema swelling at site. Will return to clinic for R DBS stage 2 on 3/30.     Discharged at 0945 to care of  and daughter at [facility].

## 2018-03-24 NOTE — DISCHARGE SUMMARY
[facility] Discharge Summary and Instructions    Hilary Cornelius MRN# [id number]   Age: 63 year old YOB: 1955     Date of Admission:  3/23/2018  Date of Discharge::  3/24/2018  Admitting Physician:  Henok Javier MD  Discharge Physician:  Henok Javier MD          Admission Diagnoses:   Essential Tremor   Essential tremor          Discharge Diagnosis:   Essential Tremor   Essential tremor          Procedures:   3/23  R Phase I DBS in VIM           Brief History of Illness:   Ms. Hilary Cornelius returns for her ongoing DBS surgery. She is s/p left side deep brain stimulator placement, phase I, placement of deep brain stimulator electrode, target left ventral intermediate nucleus of the thalamus with microelectrode recording on 3/14/2017 and s/p Phase II placement of battery generator 3/24. The surgery was without complications and she was discharged on POD#1. Patient reports some minor headaches, especially at the connector burial site and feeling tired but denies any other new symptoms. She denies fevers, chills, nausea, vomiting, dizziness, weakness, numbness or seizure like activity. She has an [employer] [facility] [facility]) segmented DBS lead inserted. Briefly, she is a 61 year old right handed female with history of essential tremor. She has had tremor as long as she can remember even as a child. She began getting treatment for her tremor 20 years ago with various medications including Propranolol, Topamax, and Sinemet. Currently she takes Gabapentin 900 mg/day and Primidone 750 mg/day. She was evaluated at [facility] back in 1990s but she was not offered any treatment. Her tremors have gotten worse in the past few years and it is socially embarrassing for her. Alcohol may help her tremor.  She has a head tremor and a vocal as well as a hand tremor. Her hand tremor affects her ability to write and she has a hard time writing and has a stamp. She has to use both  "hands in holding a glass and has to cover cups and uses a straw. She has to have her glass on the table and uses a straw. She has problems keeping food on utensils. She has had some problems with balance. Her memory is fine.  She denies mood disorders. She has a history of intermittent headaches. Her goals for DBS is to be able to \"hold a glass of water without spilling, cut things without cutting my fingers into pieces, write my name like an adult and not like a [age]-year-old, and eat food without spilling all over.\" She was seen by Laura Bonilla, Neurology APRN, on 11/09/2016 and was given a tremor score of 68. She has a history of gastric bypass and is on calcium, Vit B12 and folate. She lives in [place], [place] is about 5 hours away.  Her neurologist in [place] is Dr. Ybarra who referred her for DBS. Her case was discussed at the Movement Disorder Consensus Group Meeting and she was considered to be a good candidate for DBS, treating the right side of her body with the left sided implantation. She is now here for implantation on the right side.             Hospital Course:   Patient underwent above-mentioned procedure on 3/23. The operation was uncomplicated and she was admitted to the surgical ICU for routine post operative cares. On post operative day 1 she was ambulating, voiding without a aguilera, eating a regular diet, pain was well controlled and therefore she was discharged home.    Exam on Discharge  AOx3  CNII-XII intact  Strength 5/5 throughout, no pronator drift, b/l tremor  Right frontal incision c/d/i with dermabond in place, no significant erythema or swelling          Discharge Medications:     Current Discharge Medication List      START taking these medications    Details   oxyCODONE IR (ROXICODONE) 5 MG tablet Take 1 tablet (5 mg) by mouth every 3 hours as needed for other (pain control or improvement in physical function. Hold dose for analgesic side effects.)  Qty: 56 tablet, Refills: 0    " Associated Diagnoses: S/P deep brain stimulator placement      senna-docusate (SENOKOT-S;PERICOLACE) 8.6-50 MG per tablet Take 2 tablets by mouth 2 times daily  Qty: 100 tablet, Refills: 0    Associated Diagnoses: S/P deep brain stimulator placement         CONTINUE these medications which have NOT CHANGED    Details   diphenhydrAMINE-acetaminophen (TYLENOL PM EXTRA STRENGTH)  MG tablet Take 2 tablets by mouth nightly as needed for sleep    Associated Diagnoses: Essential tremor      Homeopathic Products (ZICAM COLD REMEDY) TBDP Take 1 tablet by mouth 2 times daily    Associated Diagnoses: Essential tremor      ferrous fumarate 65 mg, [place]. FE,-Vitamin C 125 mg (VITRON C)  MG TABS tablet Take 1 tablet by mouth daily    Associated Diagnoses: Essential tremor      gabapentin (NEURONTIN) 600 MG tablet Take 1 tablet (600 mg) by mouth 3 times daily  Qty: 270 tablet, Refills: 3    Associated Diagnoses: Chronic midline low back pain without sciatica      calcium carbonate (OS-GISELA 600 MG [place]. CA) 600 MG tablet Take 600 mg by mouth daily      Multiple vitamin TABS Take 1 tablet by mouth daily      primidone (MYSOLINE) 250 MG tablet 1 in am and 2 in the evening      cyabnocobalamin (VITAMIN B-12) 2500 MCG sublingual tablet Place 2,500 mcg under the tongue daily      folic acid (FOLVITE) 400 MCG tablet Take 400 mcg by mouth daily      acetaminophen (TYLENOL) 325 MG tablet Take 2 tablets (650 mg) by mouth every 4 hours as needed for other (surgical pain)  Qty: 100 tablet    Associated Diagnoses: Essential tremor      albuterol (PROAIR HFA, PROVENTIL HFA, VENTOLIN HFA) 108 (90 BASE) MCG/ACT inhaler Inhale 2 puffs into the lungs every 6 hours                     Discharge Instructions and Follow-Up:   Discharge diet: Regular   Discharge activity: You may advance activity as tolerated. No strenuous exercise or heavy lifting greater than 10 lbs for 4 weeks or until seen and cleared in clinic.   Discharge follow-up:  Follow-up for Phase II surgery   Wound care: Ok to shower,however no scrubbing of the wound and no soaking of the wound, meaning no bathtubs or swimming pools. Pat dry only. Leave wound open to air.  Sutures are not absorbable and need to be removed in 2 weeks. If patient still at rehab by this time, the sutures may be removed by the rehab physician if he or she considers that the wound has healed completely.     Please call if you have:  1. increased pain, redness, drainage, swelling at your incision  2. fevers > 101.5 F degrees  3. with any questions or concerns.  You may reach the Neurosurgery clinic at [phone] during regular work hours. ER at [phone].    and ask for the Neurosurgery Resident on call at [phone], during off hours or weekends.         Discharge Disposition:   Discharged to home

## 2018-03-24 NOTE — PROGRESS NOTES
Name: Hilary Cornelius                                                     Age/Sex: 63F  Rm: [id number]  MRN:  [id number]  Staff: Concepcion  Date of Admission: 3/23   Primary Service: NRSG    HPI: Lifelong history of essential tremor (head, hand and vocal) that has failed medical treatment with propranolol, sinemet and topomax. She underwent left sided DBS ([facility] - [facility] [facility] electrode) with Dr. Javier 3/2017.       Procedures:   3/23: Stealth Assisted Right DBS Phase I, Target Right Ventral Intermediate Nucleus     Daily events:   3/23: Procedure as above.      Imaging:  3/24: Skull XR (AP and lateral): completed. CTH: pending    Examination:  AOx3, CN II-XII intact, strength 5/5 throughout, no pronator dift     Assessment/Plan of care: 62 yo female with essential tremor POD#1 from phase I right DBS lead implantation in VIM.  Doing well post-operatively.      NEURO:   - Post-Operative Pain Control  - Head CT and skull X-rays on 2/34  - Neuro Examination Q 1 HR overnight    CV:  - Goal SBP < 140 mmHg    PULM:   - Incentive Spirometry Q 1 HR     GI/FEN:  - Advance Diet As Tolerated  - GI Prophylaxis Not Indicated  - Electrolyte Replacement Protocol  - Bowel Regimen w/ Senna and Miralax    RENAL/:   - Continue 0.9 NaCl 100 mL/hour    HEME/ID:  - Plts > 100k, hgb > 8, INR < 1.5   - DVT Prophylaxis: SCDs to BLE  - Clindamycin x3 doses for perioperative ppx       ENDO:   - No Issues      DISPO:  Anticipate D/C Home  3/24  Barriers: Ambulating, BM/flatus, voiding without a Jacobson, tolerating PO diet, pain controlled on PO medications  Activity: Up in Chair TID; Ambulate w/ Assist    Parmjit Kumar MD, PhD  Neurosurgery PGY-2      Please contact neurosurgery resident on call with questions.    Dial * * [phone], enter [phone] when prompted.

## 2018-03-24 NOTE — PLAN OF CARE
Problem: Patient Care Overview  Goal: Plan of Care/Patient Progress Review  Outcome: Improving  Hilary Cornelius is a 63 year old female  who presents for pre-operative H & P in preparation for a Phase I Stealth Assisted   Side Deep Brain Stimulator Placement on 3/23/18 and Phase II Placement Of Deep Brain Stimulator Generator/Battery on 3/30/18  with Dr. J[facility].     Patient is alert and orientated, neuros are intact, slight tremor with stimulator on, significant tremors when stim is off.  Patient up with assist of SB.  CV: NSR, VSS-room air at %.  GI: BM this shift, : Jacobson D/cd at 8PM, post voiding without difficulty, GI: +BM loose-hold stool/bowel meds, Skin head insisions, two with dressing and one open to air, pain to head and back covered with repositioning and tylenol, unable to tolerate narcotics or sedation, she has been nauseated post op, no zofran and comp have been given with relief.   Lyn Jacobson RN  Neuro/Trauma/Surgical ICU [facility]

## 2018-03-26 LAB — INTERPRETATION ECG - MUSE: NORMAL

## 2018-03-26 NOTE — OP NOTE
DATE OF PROCEDURE:  03/23/2018    PREOPERATIVE DIAGNOSIS:    1.  Essential tremor.  2.  S/p left side deep brain stimulator placement, phase I on 3/14/2017 and phase II on 3/24/2017.    POSTOPERATIVE DIAGNOSIS:    1.  Essential tremor.  2.  S/p left side deep brain stimulator placement, phase I on 3/14/2017 and phase II on 3/24/2017.    PROCEDURES PERFORMED:   1.  Placement of CRW stereotactic headframe.   2.  Stereotactic neuronavigation planning and CT of head.   3.  Stereotactic neuronavigation using the [employer] system for surgical planning, targeting and approach. The target is right ventral intermediate nucleus of the thalamus (Vim).   4.  Right side deep brain stimulator placement, phase I, placement of right side deep brain stimulator electrode, target is right ventral intermediate nucleus of the thalamus (Vim).   5.  Use of intraoperative microelectrode recording.   6.  Use of intraoperative fluoroscopy.    ATTENDING SURGEON:  Henok Javier MD, PhD     RESIDENT SURGEON:  Randell Hubbard MD, PhD     ANESTHESIA:  Monitored anesthesia care and local anesthetic.     ESTIMATED BLOOD LOSS:  5 mL.     COMPLICATIONS:  None.     FINDINGS:  Final electrode location, anterior Ronan Gun position, 3.5 mm below target.    IMPLANTS:    1.  Marcelo DBS electrode, Infinity 0.5, model 6172ANS, serial number [id number].  2.  Abbott bur hole cover, model 6010ANS, Lot# [id number]    INDICATIONS FOR PROCEDURE:  Ms. Hilary Cornelius returns for her second side DBS surgery.  She is s/p left side deep brain stimulator placement, phase I, placement of deep brain stimulator electrode, target left ventral intermediate nucleus of the thalamus with microelectrode recording on 3/14/2017 and s/p deep brain stimulator placement, phase II, placement of deep brain stimulator generator/battery placement over the left chest wall on 3/24/2017.  It has been approximately 1 year since her left side implantation surgery.  Briefly, she is a 61 year old  "right handed female with history of essential tremor.  She has had tremor as long as she can remember even as a child.  She began getting treatment for her tremor 20 years ago with various medications including Propranolol, Topamax, and Sinemet.  Currently she takes Gabapentin 900 mg/day and Primidone 750 mg/day.  She was evaluated at [facility] back in 1990s but she was not offered any treatment.  Her tremors have gotten worse in the past few years and it is socially embarrassing for her.  Alcohol may help her tremor.  She has a head tremor and a vocal as well as a hand tremor.  Her hand tremor affects her ability to write and she has a hard time writing and has a stamp.  She has to use both hands in holding a glass and has to cover cups and uses a straw.  She has to have her glass on the table and uses a straw.  She has problems keeping food on utensils.  She has had some problems with balance.  Her memory is fine.  She denies mood disorders. She has a history of intermittent headaches.  Her goals for DBS is to be able to \"hold a glass of water without spilling, cut things without cutting my fingers into pieces, write my name like an adult and not like a [age]-year-old, and eat food without spilling all over.\"  She was seen by Laura Bonilla, Neurology APRN, on 11/09/2016 and was given a tremor score of 68.  She has a history of gastric bypass and is on calcium vit b12 when remembers and folate.  She lives in [place], which is about 5 hours away.  Her neurologist in [place] is Dr. Ybarra who referred her for DBS.  She did have an excellent therapeutic effect from her left side DBS system and her case was again discussed at the Movement Disorder [employer]ensus [employer] meeting and the second side surgery was recommended.  We discussed both phase I and II of DBS surgery in detail.  We discussed the procedure for the second side implantation.  We discussed that during Phase I, we would place the DBS electrode on the right " side under MAC and microelectrode recording.  She would then return one week later for phase II for the placement of the DBS generator/battery over the right chest wall under general anesthesia.  Risks, benefits, alternative therapies were discussed with the patient, including but not limited to infection and bleeding.  Surgical procedure was discussed in detail.  All questions were answered, and she expressed understanding.     DESCRIPTION OF PROCEDURE:      CRW head frame placement and stereotactic neuronavigation.       Informed consent was obtained.  The patient was brought to the operating room and laid supine and kept on the gurney.  She was identified and a brief timeout was performed for the placement of the CRW head frame.  She was given sedation to make her comfortable.  The intended pin sites, two in the front and two in the back of the head, were cleaned and were injected with local anesthetic, 1% lidocaine with epinephrine.  A total of 24 mL were used during this portion of the surgical case.  The CRW stereotactic head frame was placed onto her head with 4 pins for fixation.  Care was taken so that the fiducial box wound fit over the head and the frame.  Care was also taken to make sure that the left posterior pin does not damage the already implanted DBS system, namely extension wire.  Once the head frame was on, the fiducial box was easily placed without interference from her forehead. The patient tolerated the procedure well and her sedation was lightened and she was awakened.      After the CRW stereotactic head frame was placed, she was taken directly to the CT scanner, at which time a CT scan of the head stereotactic protocol was obtained.  Subsequently, the patient was taken back up to the operating room where she was placed supine on the operative bed with the CRW head frame affixed to the bed as well.  Patient was in a slight sitting up position with the neck in a neutral position and she was  made comfortable.  The bed was positioned so that it would be a beach chair reclining position.  All pressure points were well padded.  Care was taken to make sure that the neck was in neutral position and that the Suazo head long device had room for manipulation in case more flexion or extension is needed throughout the case.     At this time, attention was turned to the neuro navigation imaging that was obtained.  The Stealth neuronavigation device was loaded with the CT head that had just been obtained.  The device also had an MRI of the head, stereotactic protocol, that was obtained prior to surgery.  CT of the head was merged with the previously obtained MRI of the brain.  The merge demonstrated good coherence/registration.  Then, using this merged image, neuronavigation was used to stereotactically target the right ventral intermediate nucleus of the thalamus (Vim).  The technique used was the AC-PC line localization technique to target the Vim using stereotactic coordinates and the X, Y and Z as well as the ring and arc angles for the CRW head frame were obtained for the right side.  The distance from the wall of the 3rd ventricle was approximately 11 mm.  The entry into the skull, as well as the trajectory of the electrode were checked with a probe's eye view to avoid any sulci, ventricle or vascular structures.  Based on the location of the left side electrode, which is deemed effective, we adjusted the target area by 1.0 mm anteriorly using the X-Y stage shift.  This would allow us to hopefully identify and record Vc with the posterior electrode, so that we can place the final electrode in the anterior track.    The stereotactic coordinates for the right side was then transferred to the [facility] stereotactic targeting apparatus as well as the phantom base.  The coordinates were confirmed and double checked for accuracy.  Accuracy was also confirmed using phantom base.  The coordinates were X = +13.9, Y =  -12.7, Z =+4.8, ring angle = 51.0 degrees anterior, and arc angle = 16.0 degrees to the right.     At this time, attention was turned back to the patient.  Using a hair clipper, her hair over the right frontal area was clipped.  A semicircular C-shaped incision was planned on the right side and this was marked.  Then, the surgical area was prepped and draped in routine surgical fashion.  We also prepped the area posterior to this as well as area towards the right parietal area.  The patient was also made comfortable.     Timeout was then performed confirming the patient's identity, procedure to be performed, side and site of surgery identified, imaging corresponding with the patient and administration of preoperative prophylactic antibiotic.    Right-sided electrode placement with microelectrode recording: Target right Vim.     The CRW targeting apparatus was attached to the head frame on top of the sterile drapes.  The entry point was marked on the scalp on the right side.  A C-shaped incision was made with a skin knife, after the area was injected with local anesthetic, 1% Lidocaine with epinephrine and 1/4% Marcaine plain, 50:50 mix.  The area posterior to the incision was also injected as a pocket would be created.  Area posterior lateral, towards the right parietal area was also injected as the electrode connector will be tunneled here later.  Total of 20 mL of the above mentioned local anesthetic was used.  The incision was then further carried down to the pericranium.  Hemostasis was obtained using monopolar and bipolar cautery.  Thin layer of pericranial tissue was left behind on the scalp and the skin flap was reflected posteriorly.  Then, using a blunt dissection technique, a pocket was created posteriorly as well as a track that was made towards the right parietal area for later use.    At this time, the targeting apparatus again positioned and the entry point to the skull was marked.  Area of the  intended bur hole was cleaned and pericranial tissue removed.  Then using an acorn drill, bur hole was created over this entry point to expose the dura.  The area was vigorously irrigated and bone wax used to control the bone bleeding.  Dura was coagulated with bipolar cautery for hemostasis, and again no active bleeding was noted.     At this time, the electrode fixation cover was fixed to the skull using two screws.  Care was taken to overlap the pericranium over the edge of the cover to provide a smooth tissue transition.  Then, the electrode drive was attached to the targeting apparatus.  The entry into the dura was again checked.  It appeared that all positions of the Ronan Gun electrode long were accessible without any interference from the bone edge.  Then using a Bovie cautery and a #4 Penfield instrument, opening was made into the dura, as well as a small corticectomy.  No active bleeding was noted.    Dr. Orquidea Armijo and Dr. Parish Estrada of the Neurology Department participated in the recording and neurological testing.  During the microelectrode recording and testing, Dr. Armijo and Dr. Estrada took detailed notes of the electrophysiologic data and neurologic exam as well as any stimulation effects.    At this time, the electrode guides were inserted in the center and posterior Ronan Gun positions and they were advanced slowly until they were fully inserted at which point the tips would be well above the target.  No abnormal resistance was noted.  Duraseal was used to seal the entry site to provide a seal and to minimize cerebrospinal fluid leak and air entry.  Then, recording microelectrodes were brought in and placed within the guide tubes and they were connected for intraoperative recording.  The tips of the electrode were now 15 mm above target.  The patient was awakened.  Then, using a micro drive, the electrodes were slowly advanced towards the target, collecting microelectrode recording data for  mapping the track.  The center track yielded no activity that can be correlated to Vc.  The posterior track yielded somatosensory response activity related to elbow, wrist and mouth/face.  Also, approximately 3.5 mm of Vc correlating to face, cheek and tongue were found.  Tremor was decreased with ring electrode stimulation in the center track.    Based on the electrophysiology data collected, it was thought that the current tracks were satisfactory in identifying the desirable target area which is the anterior Ronan Gun location.  Current location of the Ronan Gun was therefore satisfactory.  The electrodes were pulled out of the guide tubes and the posterior guide tube was removed.  The center guide tube was kept in place to minimize brain shift.  Then, the removed guide tube was placed back into the anterior Ronan Gun position for the final electrode insertion.  The electrode drive was then positioned to the desired depth position with the micro drive, 2.5 mm below the target depth.  The permanent DBS electrode was brought into the field and placed in the anterior guide tube with the tip being placed at 2.5 mm below the target depth.  The electrode demonstrated good impedance values.  The electrode was tested.  With 2- 3+ configuration, no paresthesia or internal capsular effects were noted up to 5 mV.  At 1- 2+ configuration, significant tremor reduction was noted.  No apparent dysmetria was noted.  At 90 microsecond pulse width, paresthesias in the fingers were noted.  Based on the results, it was decided to place the electrode at 3.5 mm below the target.  This was accomplished by driving down with the microdrive to 3.5 mm below the target.  It was thought that the current location may be the best location of the permanent electrode.    With the satisfactory testing of the electrode position and the stimulation parameters, the electrode was secured at this location.  The patient was again made comfortable.  The guide  tubes were removed.  Duraseal was again used to seal any opening.  The area was generously irrigated.  Hemostasis was also obtained.  Fluoroscopy was brought into the field to test that the electrode did not move with each manipulation.  The electrode tip was seen to be below the target, as expected.  The electrode clamp was applied to hold the electrode.  Then, the electrode stylet was removed. The electrode was then removed from the electrode long.  The cap cover was finally placed and secured in place.  Fluoroscopy confirmed that the tip of the electrode did not change in position with each manipulation.  The directional marker, on fluoroscopy, also demonstrated that the contact 2A is facing anteriorly.    The connecting portion of the electrode was covered with a protective covering/dead-end connector, and this apparatus was inserted into the subgaleal space/pocket towards the right parietal area that was created at the beginning of the case.  The excess wire was also buried posteriorly in the previously created pocket.  Fluoroscopy confirmed that the tip did not move.  The wound was then generously irrigated.    The galeal layer was reapproximated using 3-0 Vicryl sutures and the skin was reapproximated using 4-0 Monocryl suture in a running fashion.  The wound was cleaned and dried and prepped with ChoraPrep.  Then, Dermabond was applied.    Removal of the head frame and end of procedure    First, the stereotactic targeting apparatus was removed.  Then, the sterile drapes were removed.  Subsequently, the patient was taken out of the CRW head frame.  The four pin sites were clean and dry and no active bleeding was noted.  Antibiotic ointment was applied to the pin sites.    Patient was further awakened and taken to the recovery room in a stable condition.  At the end of the case, all counts including needle, sponge and instrument counts were correct x 2.  There were no complications.    Dr. Javier was present  and scrubbed for the entirety of this case.       HENOK JAVIER MD      As dictated by MIGUE SCHREIBER MD       NEUROSURGERY ATTENDING ATTESTATION: I, Henok Javier M.D., Ph.D., Neurosurgery Attending, was present and scrubbed for the entire case and performed the key and critical portions of the case.      D: 2018   T: 2018   MT: [id number]     Name:     RONY MARTIN   MRN:      [id number]        Account:        [id number]   :      1955           Procedure Date: 2018     Document: [id number]

## 2018-03-27 ENCOUNTER — CARE COORDINATION (OUTPATIENT)
Dept: NEUROSURGERY | Facility: CLINIC | Age: 63
End: 2018-03-27

## 2018-03-27 NOTE — PROGRESS NOTES
Left VM s/p Right DBS lead placement on 3/23/18 and discharge on 3/24/18. Called pt to check on her post op status and discuss any questions/concerns for 3/30/18 battery placement procedure. Left my direct number.

## 2018-03-28 ENCOUNTER — CARE COORDINATION (OUTPATIENT)
Dept: NEUROSURGERY | Facility: CLINIC | Age: 63
End: 2018-03-28

## 2018-03-28 NOTE — PROGRESS NOTES
"Neurosurgery Discharge Coordination Note     Attending physician: Dr. Javier  Surgery performed: DBS lead placement - Right  Date of Discharge: 3/24/18  Discharge to: Home     Current status: Patient states she  feels very exhausted still from the surgery. She is not sleeping well and hasn't \"caught up\" on her sleep following surgery and hourly neurological checks while in the ICU. She has some incisional discomfort for which she is taking Tylenol with adequate relief. Denies redness, swelling, increased tenderness, drainage, incision opening or elevated temp. Reports Incision CDI without signs of infection.  Denies current bowel or bladder issues.     Requesting that battery placement scheduled for 3/30 be delayed until the following week if possible, but she will move forward with it if we cannot delay. I have messaged Dr. Javier as well as our surgery scheduler to gauge the possibility and will f/u with pt. We can rescheduled to 4/6/18 at 9:00. Pt has been notified and is in agreement with new surgery date.     Discharge instructions and medications reviewed with patient.  Follow up appointments/imaging/tests needed: DBS battery placement rescheduled from 3/30/18 to 4/6/18 at 9:00.     RN triage/on call number given: [phone]/ [phone]      "

## 2018-04-05 ENCOUNTER — ANESTHESIA EVENT (OUTPATIENT)
Dept: SURGERY | Facility: CLINIC | Age: 63
End: 2018-04-05
Payer: COMMERCIAL

## 2018-04-06 ENCOUNTER — HOSPITAL ENCOUNTER (OUTPATIENT)
Facility: CLINIC | Age: 63
Discharge: HOME | End: 2018-04-06
Attending: NEUROLOGICAL SURGERY | Admitting: NEUROLOGICAL SURGERY
Payer: COMMERCIAL

## 2018-04-06 ENCOUNTER — ANESTHESIA (OUTPATIENT)
Dept: SURGERY | Facility: CLINIC | Age: 63
End: 2018-04-06
Payer: COMMERCIAL

## 2018-04-06 VITALS
HEIGHT: 67 IN | BODY MASS INDEX: 30.52 KG/M2 | TEMPERATURE: 98.2 F | SYSTOLIC BLOOD PRESSURE: 139 MMHG | DIASTOLIC BLOOD PRESSURE: 77 MMHG | WEIGHT: 194.45 LBS | OXYGEN SATURATION: 97 % | RESPIRATION RATE: 14 BRPM | HEART RATE: 89 BPM

## 2018-04-06 DIAGNOSIS — Z96.89 S/P DEEP BRAIN STIMULATOR PLACEMENT: Primary | ICD-10-CM

## 2018-04-06 LAB
GLUCOSE BLDC GLUCOMTR-MCNC: 125 MG/DL (ref 70–99)
GLUCOSE BLDC GLUCOMTR-MCNC: 136 MG/DL (ref 70–99)

## 2018-04-06 PROCEDURE — 27210794 ZZH OR GENERAL SUPPLY STERILE: Performed by: NEUROLOGICAL SURGERY

## 2018-04-06 PROCEDURE — 25000125 ZZHC RX 250

## 2018-04-06 PROCEDURE — 25000128 H RX IP 250 OP 636: Performed by: ANESTHESIOLOGY

## 2018-04-06 PROCEDURE — 37000008 ZZH ANESTHESIA TECHNICAL FEE, 1ST 30 MIN: Performed by: NEUROLOGICAL SURGERY

## 2018-04-06 PROCEDURE — C1883 ADAPT/EXT, PACING/NEURO LEAD: HCPCS | Performed by: NEUROLOGICAL SURGERY

## 2018-04-06 PROCEDURE — 25000128 H RX IP 250 OP 636: Performed by: NEUROLOGICAL SURGERY

## 2018-04-06 PROCEDURE — 40000170 ZZH STATISTIC PRE-PROCEDURE ASSESSMENT II: Performed by: NEUROLOGICAL SURGERY

## 2018-04-06 PROCEDURE — 37000009 ZZH ANESTHESIA TECHNICAL FEE, EACH ADDTL 15 MIN: Performed by: NEUROLOGICAL SURGERY

## 2018-04-06 PROCEDURE — 71000014 ZZH RECOVERY PHASE 1 LEVEL 2 FIRST HR: Performed by: NEUROLOGICAL SURGERY

## 2018-04-06 PROCEDURE — 25000128 H RX IP 250 OP 636

## 2018-04-06 PROCEDURE — C9399 UNCLASSIFIED DRUGS OR BIOLOG: HCPCS

## 2018-04-06 PROCEDURE — 36000057 ZZH SURGERY LEVEL 3 1ST 30 MIN - UMMC: Performed by: NEUROLOGICAL SURGERY

## 2018-04-06 PROCEDURE — 82962 GLUCOSE BLOOD TEST: CPT

## 2018-04-06 PROCEDURE — C1767 GENERATOR, NEURO NON-RECHARG: HCPCS | Performed by: NEUROLOGICAL SURGERY

## 2018-04-06 PROCEDURE — 25000125 ZZHC RX 250: Performed by: NEUROLOGICAL SURGERY

## 2018-04-06 PROCEDURE — 71000027 ZZH RECOVERY PHASE 2 EACH 15 MINS: Performed by: NEUROLOGICAL SURGERY

## 2018-04-06 PROCEDURE — 27210995 ZZH RX 272: Performed by: NEUROLOGICAL SURGERY

## 2018-04-06 PROCEDURE — 36000059 ZZH SURGERY LEVEL 3 EA 15 ADDTL MIN UMMC: Performed by: NEUROLOGICAL SURGERY

## 2018-04-06 DEVICE — LEAD EXTENSION W/EXTEND TECHNOLOGY 50CM 6371ANS: Type: IMPLANTABLE DEVICE | Site: SCALP | Status: FUNCTIONAL

## 2018-04-06 DEVICE — GENERATOR DBS SYSTEM INFINITY 5 IPG 6660ANS
Type: IMPLANTABLE DEVICE | Site: CHEST  WALL | Status: NON-FUNCTIONAL
Removed: 2022-11-18

## 2018-04-06 RX ORDER — NALOXONE HYDROCHLORIDE 0.4 MG/ML
.1-.4 INJECTION, SOLUTION INTRAMUSCULAR; INTRAVENOUS; SUBCUTANEOUS
Status: DISCONTINUED | OUTPATIENT
Start: 2018-04-06 | End: 2018-04-06 | Stop reason: HOSPADM

## 2018-04-06 RX ORDER — HYDROMORPHONE HYDROCHLORIDE 1 MG/ML
.3-.5 INJECTION, SOLUTION INTRAMUSCULAR; INTRAVENOUS; SUBCUTANEOUS EVERY 5 MIN PRN
Status: DISCONTINUED | OUTPATIENT
Start: 2018-04-06 | End: 2018-04-06 | Stop reason: HOSPADM

## 2018-04-06 RX ORDER — FENTANYL CITRATE 50 UG/ML
25-50 INJECTION, SOLUTION INTRAMUSCULAR; INTRAVENOUS
Status: DISCONTINUED | OUTPATIENT
Start: 2018-04-06 | End: 2018-04-06 | Stop reason: HOSPADM

## 2018-04-06 RX ORDER — CLINDAMYCIN HCL 150 MG
150 CAPSULE ORAL 3 TIMES DAILY
Qty: 21 CAPSULE | Refills: 0 | Status: SHIPPED | OUTPATIENT
Start: 2018-04-06 | End: 2018-04-13

## 2018-04-06 RX ORDER — METOPROLOL TARTRATE 1 MG/ML
1-2 INJECTION, SOLUTION INTRAVENOUS EVERY 5 MIN PRN
Status: DISCONTINUED | OUTPATIENT
Start: 2018-04-06 | End: 2018-04-06 | Stop reason: HOSPADM

## 2018-04-06 RX ORDER — SODIUM CHLORIDE, SODIUM LACTATE, POTASSIUM CHLORIDE, CALCIUM CHLORIDE 600; 310; 30; 20 MG/100ML; MG/100ML; MG/100ML; MG/100ML
INJECTION, SOLUTION INTRAVENOUS CONTINUOUS
Status: DISCONTINUED | OUTPATIENT
Start: 2018-04-06 | End: 2018-04-06 | Stop reason: HOSPADM

## 2018-04-06 RX ORDER — CLINDAMYCIN PHOSPHATE 900 MG/50ML
900 INJECTION, SOLUTION INTRAVENOUS
Status: COMPLETED | OUTPATIENT
Start: 2018-04-06 | End: 2018-04-06

## 2018-04-06 RX ORDER — PROPOFOL 10 MG/ML
INJECTION, EMULSION INTRAVENOUS CONTINUOUS PRN
Status: DISCONTINUED | OUTPATIENT
Start: 2018-04-06 | End: 2018-04-06

## 2018-04-06 RX ORDER — ONDANSETRON 2 MG/ML
4 INJECTION INTRAMUSCULAR; INTRAVENOUS EVERY 30 MIN PRN
Status: DISCONTINUED | OUTPATIENT
Start: 2018-04-06 | End: 2018-04-06 | Stop reason: HOSPADM

## 2018-04-06 RX ORDER — LIDOCAINE 40 MG/G
CREAM TOPICAL
Status: DISCONTINUED | OUTPATIENT
Start: 2018-04-06 | End: 2018-04-06 | Stop reason: HOSPADM

## 2018-04-06 RX ORDER — PROPOFOL 10 MG/ML
INJECTION, EMULSION INTRAVENOUS PRN
Status: DISCONTINUED | OUTPATIENT
Start: 2018-04-06 | End: 2018-04-06

## 2018-04-06 RX ORDER — HYDRALAZINE HYDROCHLORIDE 20 MG/ML
2.5-5 INJECTION INTRAMUSCULAR; INTRAVENOUS EVERY 10 MIN PRN
Status: DISCONTINUED | OUTPATIENT
Start: 2018-04-06 | End: 2018-04-06 | Stop reason: HOSPADM

## 2018-04-06 RX ORDER — LIDOCAINE HYDROCHLORIDE 20 MG/ML
INJECTION, SOLUTION INFILTRATION; PERINEURAL PRN
Status: DISCONTINUED | OUTPATIENT
Start: 2018-04-06 | End: 2018-04-06

## 2018-04-06 RX ORDER — CLINDAMYCIN PHOSPHATE 900 MG/50ML
900 INJECTION, SOLUTION INTRAVENOUS SEE ADMIN INSTRUCTIONS
Status: DISCONTINUED | OUTPATIENT
Start: 2018-04-06 | End: 2018-04-06 | Stop reason: HOSPADM

## 2018-04-06 RX ORDER — ONDANSETRON 4 MG/1
4 TABLET, ORALLY DISINTEGRATING ORAL EVERY 30 MIN PRN
Status: DISCONTINUED | OUTPATIENT
Start: 2018-04-06 | End: 2018-04-06 | Stop reason: HOSPADM

## 2018-04-06 RX ORDER — ONDANSETRON 2 MG/ML
INJECTION INTRAMUSCULAR; INTRAVENOUS PRN
Status: DISCONTINUED | OUTPATIENT
Start: 2018-04-06 | End: 2018-04-06

## 2018-04-06 RX ORDER — FENTANYL CITRATE 50 UG/ML
INJECTION, SOLUTION INTRAMUSCULAR; INTRAVENOUS PRN
Status: DISCONTINUED | OUTPATIENT
Start: 2018-04-06 | End: 2018-04-06

## 2018-04-06 RX ADMIN — PROPOFOL 150 MCG/KG/MIN: 10 INJECTION, EMULSION INTRAVENOUS at 08:51

## 2018-04-06 RX ADMIN — SUGAMMADEX 200 MG: 100 INJECTION, SOLUTION INTRAVENOUS at 10:21

## 2018-04-06 RX ADMIN — FENTANYL CITRATE 100 MCG: 50 INJECTION, SOLUTION INTRAMUSCULAR; INTRAVENOUS at 08:51

## 2018-04-06 RX ADMIN — ROCURONIUM BROMIDE 40 MG: 10 INJECTION INTRAVENOUS at 08:51

## 2018-04-06 RX ADMIN — FENTANYL CITRATE 50 MCG: 50 INJECTION, SOLUTION INTRAMUSCULAR; INTRAVENOUS at 10:18

## 2018-04-06 RX ADMIN — CLINDAMYCIN PHOSPHATE 900 MG: 18 INJECTION, SOLUTION INTRAVENOUS at 08:55

## 2018-04-06 RX ADMIN — LIDOCAINE HYDROCHLORIDE 80 MG: 20 INJECTION, SOLUTION INFILTRATION; PERINEURAL at 08:45

## 2018-04-06 RX ADMIN — FENTANYL CITRATE 25 MCG: 50 INJECTION, SOLUTION INTRAMUSCULAR; INTRAVENOUS at 10:16

## 2018-04-06 RX ADMIN — ROCURONIUM BROMIDE 50 MG: 10 INJECTION INTRAVENOUS at 09:23

## 2018-04-06 RX ADMIN — FENTANYL CITRATE 25 MCG: 50 INJECTION, SOLUTION INTRAMUSCULAR; INTRAVENOUS at 10:15

## 2018-04-06 RX ADMIN — SODIUM CHLORIDE, POTASSIUM CHLORIDE, SODIUM LACTATE AND CALCIUM CHLORIDE: 600; 310; 30; 20 INJECTION, SOLUTION INTRAVENOUS at 08:44

## 2018-04-06 RX ADMIN — Medication 0.2 MG: at 11:22

## 2018-04-06 RX ADMIN — ONDANSETRON 4 MG: 2 INJECTION INTRAMUSCULAR; INTRAVENOUS at 09:59

## 2018-04-06 RX ADMIN — ROCURONIUM BROMIDE 10 MG: 10 INJECTION INTRAVENOUS at 09:14

## 2018-04-06 RX ADMIN — PROPOFOL 60 MG: 10 INJECTION, EMULSION INTRAVENOUS at 09:41

## 2018-04-06 RX ADMIN — PROPOFOL 50 MG: 10 INJECTION, EMULSION INTRAVENOUS at 10:16

## 2018-04-06 RX ADMIN — PROPOFOL 130 MG: 10 INJECTION, EMULSION INTRAVENOUS at 08:51

## 2018-04-06 NOTE — BRIEF OP NOTE
[facility], [place]    Brief Operative Note    Pre-operative diagnosis: Essential Tremor   Post-operative diagnosis Same  Procedure: Procedure(s):  Deep Brain Stimulator Placement, Phase II, Placement Of Deep Brain Stimulator Generator/Battery Over The Right Chest Wall - Wound Class: I-Clean  Surgeon: Surgeon(s) and Role:     * Henok Javier MD - Primary     * Parmjit Kumar MD - Resident - Assisting  Anesthesia: General   Estimated blood loss: * No values recorded between 4/6/2018  9:30 AM and 4/6/2018 10:33 AM *  Drains: None  Specimens: * No specimens in log *  Findings:   [employer] battery/generator placed, impedances wnl.  Complications: None.  Implants: [employer] battery/generator with extension wire

## 2018-04-06 NOTE — H&P
"NEUROSURGERY    HISTORY AND PHYSICAL EXAM UPDATE    Chief Complaint   Patient presents with     Consult         Ongoing DBS implantation surgery for Essential Tremor, s/p right side deep brain stimulator placement, phase I, placement of deep brain stimulator electrode, target right ventral intermediate nucleus of the thalamus with microelectrode recording on 3/23/2018.  S/p left side deep brain stimulator placement in 3/2017       HISTORY OF PRESENT ILLNESS  Ms. Hilary Cornelius returns for her ongoing DBS surgery.  She is s/p right side deep brain stimulator placement, phase I, placement of deep brain stimulator electrode, target right ventral intermediate nucleus of the thalamus with microelectrode recording on 3/23/2018. The surgery was without complications and she was discharged on POD#1. Patient reports some minor headaches, especially at the [place] and feeling tired.  This time, however, patient felt \"out of sorts\" and fatigued more so than her first side surgery that was done about a year ago.   She denies fevers, chills, nausea, vomiting, dizziness, weakness, numbness or seizure like activity.  She had asked to reschedule her phase II surgery so it was delayed.  She has an [employer] segmented DBS lead inserted.  She has been doing well, otherwise, and she does not report any issues with the incision.  Briefly, she is a 61 year old right handed female with history of essential tremor.  She has had tremor as long as she can remember even as a child.  She began getting treatment for her tremor 20 years ago with various medications including Propranolol, Topamax, and Sinemet. Currently she takes Gabapentin 900 mg/day and Primidone 750 mg/day.  She was evaluated at [facility] back in 1990s but she was not offered any treatment.  Her tremors have gotten worse in the past few years and it is socially embarrassing for her.  Alcohol may help her tremor.  She has a head tremor " "and a vocal as well as a hand tremor.  Her hand tremor affects her ability to write and she has a hard time writing and has a stamp.  She has to use both hands in holding a glass and has to cover cups and uses a straw.  She has to have her glass on the table and uses a straw.  She has problems keeping food on utensils.  She has had some problems with balance.  Her memory is fine.  She denies mood disorders. She has a history of intermittent headaches.  Her goals for DBS is to be able to \"hold a glass of water without spilling, cut things without cutting my fingers into pieces, write my name like an adult and not like a [age]-year-old, and eat food without spilling all over.\"  She was seen by Laura Bonilla, Neurology APRN, on 11/09/2016 and was given a tremor score of 68.  She has a history of gastric bypass and is on calcium vit b12 when remembers and folate.  She lives in [place], which is about 5 hours away.  Her neurologist in [place] is Dr. Ybarra who referred her for DBS.  She did have an excellent therapeutic effect from her left side DBS system and her case was again discussed at the Movement Disorder Consensus Group meeting and the second side surgery was recommended.    Past Medical History:   Diagnosis Date     Chronic pain 11/8/2016     Cognitive disorder 12/4/2016 2016 evaluation   IMPRESSIONS AND RECOMMENDATIONS  Current results indicate overall intellectual functioning estimated fall in the mildly impaired range, marginally below premorbid estimates of low average based on single word reading abilities. She demonstrates a relative inefficiency in learning of new material, but retains information quite well once she has learned it. Visual-spatial abilities     Exercise-induced asthma      Gastroesophageal reflux disease 3/30/2015    XR ESOPHAGRAM7/8/2016  [facility]  Result Narrative  This document is currently in Final Status  Exam Accession# [id number]  XR ESOPHAGRAM  CLINICAL INFORMATION: " Dysphagia;   COMPARISON: None.  FINDINGS: The esophagus is normal in course and caliber in this post gastric bypass patient. No intrinsic or extrinsic mass lesions are identified. No strictures. There is a small hiatal hernia. Gastroesophageal reflux is observed during this examination  with numerous tertiary contractions and delayed esophageal clearance.  IMPRESSION:  1. Gastroesophageal reflux with numerous tertiary contractions and delayed esophageal clearance. 2. Sliding hiatal hernia in this post gastric bypass patient.  Dictated By: Dandre Wyatt MD 7/8/2016 9:47 AM Edited By: MIKE 7/8/2016 10:02 AM  Electronically Signed: Dandre Wyatt MD 7/8/2016 4:24 PM   Status Results Details         H/O bariatric surgery 11/08/2016     Headache disorder 11/05/2016     Heart murmur 11/8/2016     Rheumatic fever      T2DM (type 2 diabetes mellitus) (H)     Under control s/p gastric bypass     Tremor 11/5/2016     Past Surgical History:   Procedure Laterality Date     APPENDECTOMY       CHOLECYSTECTOMY       COLONOSCOPY       GASTRIC BYPASS  2005     IMPLANT DEEP BRAIN STIMULATION GENERATOR / BATTERY Left 3/24/2017    Procedure: IMPLANT DEEP BRAIN STIMULATION GENERATOR / BATTERY;  Surgeon: Henok Javier MD;  Location: [facility] OR     OPTICAL TRACKING SYSTEM INSERTION DEEP BRAIN STIMULATION Left 3/14/2017    Procedure: OPTICAL TRACKING SYSTEM INSERTION DEEP BRAIN STIMULATION;  Surgeon: Henok Javier MD;  Location: [facility] OR     OPTICAL TRACKING SYSTEM INSERTION DEEP BRAIN STIMULATION Right 3/23/2018    Procedure: OPTICAL TRACKING SYSTEM INSERTION DEEP BRAIN STIMULATION;  Stealth Assisted Right Deep Brain Stimulator Placement, Phase I, Placement Right Side Deep Brain Stimulator Electrode, Target Right Ventral Intermediate Nucleus Of The Thalamus With Microelectrode Recording;  Surgeon: Henok Javier MD;  Location: [facility] OR     thoracic spine surgery       TUBAL LIGATION       Social History     Social History      Marital status:      Spouse name: N/A     Number of children: N/A     Years of education: N/A     Occupational History     Not on file.     Social History Main Topics     Smoking status: Never Smoker     Smokeless tobacco: Never Used     Alcohol use 0.0 oz/week     0 Standard drinks or equivalent per week      Comment: social     Drug use: Not on file     Sexual activity: Not on file     Other Topics Concern     Not on file     Social History Narrative    from [facility].  to Adilson MONTES HISTORY: The patient was born in [place], [place]. The patient was educated formally through 12 years. The patient is presently disabled due to her spinal difficulties. The patient has been  40 years. The patient has 3 children of that marriage. The patient does not use tobacco. The patient has social use of alcohol. The patient has no history of drug or intravenous drug use or abuse.         FAMILY HISTORY: A strong family history of tremor with mother having tremor, but also a strong paternal family history of tremor with father with multiple cousins. Patient does have one cousin with Parkinson disease. The patients father suffered with dementia of the Alzheimer type.     2 Daughters and son    Daughter lives 20 miles away in [place] from her her    Daughter lives in [place]    Son lives in [place]        Staying at the [facility]     Family History   Problem Relation Age of Onset     Tremor Mother      Cardiomyopathy Mother      had surgery at [facility]     Dementia Father      Alzheimer's disease     Parkinsonism Other      paternal aunt's son - cousin     Tremor Daughter         Allergies   Allergen Reactions     Asa [Aspirin] Unknown     Bee Venom Swelling     Penicillins      rash       Current Outpatient Prescriptions   Medication     calcium carbonate (OS-GISELA 600 MG [place]. CA) 600 MG tablet     clonazePAM (KLONOPIN) 0.5 MG tablet     lisinopril  (PRINIVIL,ZESTRIL) 5 MG tablet     Multiple vitamin TABS     albuterol (VENTOLIN HFA) 108 (90 BASE) MCG/ACT inhaler     HYDROcodone-acetaminophen (NORCO) 5-325 MG per tablet     omeprazole (PRILOSEC) 20 MG capsule     primidone (MYSOLINE) 250 MG tablet     albuterol (PROAIR HFA, PROVENTIL HFA, VENTOLIN HFA) 108 (90 BASE) MCG/ACT inhaler     cyabnocobalamin (VITAMIN B-12) 2500 MCG sublingual tablet     folic acid (FOLVITE) 400 MCG tablet     gabapentin (NEURONTIN) 600 MG tablet       No current facility-administered medications for this visit.       REVIEW OF SYSTEMS  General: POSITIVE for headaches. Negative for difficulty sleeping, chills/sweats/fever, fatigue, weight gain or loss.  Skin: negative for color changes of the hands or feet in the cold, chronic skin itching, poor scarring/non-healing ulcer, skin rashes or hives, or unusual moles.   Eyes: negative for blurred vision, crossed eyes, double vision, eye infection or vision flashes or halos  Ears/Nose/Throat: negative for bleeding gums, difficulty swallowing, negative for earache, ear discharge or hearing loss.  Respiratory: POSITIVE for shortness of breath with exertion. Negative for asthma, chronic cough, coughing up blood, night sweats tuberculosis, wheezing.  Cardiovascular: POSITIVE for heart murmurs. Negative for lower extremity swelling, chest pain, difficulty breathing at night, irregular heart beat, pacemaker, varicose vein.  Gastrointestinal: POSITIVE for heartburn. Negative for black stools, blood in stool, chronic diarrhea, Hepatitis A, B, C, constipation, liver disease, nausea, vomiting.  Genitourinary: negative for difficulty with urination, discharges, urgency, urinary tract infection.  Musculoskeletal: POSITIVE for arthritis in knees. Negative for joint swelling, muscle tenderness, osteoporosis.  Neurologic: POSITIVE for headaches and tremors. Negative numbness of arms or legs, problem with memory, tingling of hands, arms or  legs.  Psychiatric: negative for anxiety, depression, panic attacks, restlessness  Hematologic/Lymphatic/Immunologic: POSITIVE for easy skin bruising but negative workup. Negative for marked fatigue, prolonged bleeding from cuts or tooth extractions, tender glands/lymph nodes.  Endocrine: negative for excessive hunger/thirst, intolerance to warm room, loss of libido, multiple broken bones, rapid weight gain/loss, thyroid problems, spontaneous nipple discharge.  Allergic: negative for hay fever or asthma.      PHYSICAL EXAM  Temp: 98.5  F (36.9  C) Temp src: Oral BP: 117/89 Pulse: 89   Resp: 14 SpO2: 100 % O2 Device: None (Room air)      General: Awake, alert, oriented. Well nourished, well developed. She is not in any acute distress.  HEENT: Head normocephalic, atraumatic. No carotid bruit. Neck supple. Good range of motion. No palpable thyroid mass.  Heart: Regular rhythm and rate. Heart murmur present.  Lungs: Clear to auscultation and percussion bilaterally. No ronchi, rales or wheeze.  Abdomen: Soft, non-tender, non-distended. No hepatosplenomegaly.  Extremity: Warm with no clubbing or cyanosis. No lower extremity edema.  Right frontal incision clean/dry and intact. No fluid collection. No drainage. No redness.  Left side DBS system incisions have all healed well.    Neurological  Awake, alert and oriented to date, time, place and person. Speech fluent.   Pupils equal, round, reactive to light.  Extraocular movement intact.  Visual fields are full on confrontation.  Hearing is grossly normal to finger rub.   Facial sensation intact.  Face symmetric.  Tongue midline.  Uvula elevates equally.    Motor: full strength throughout.  Sensation: intact to light touch and pinpoint.  Deep tendon reflexes: 3+ throughout. Negative for clonus. Negative for Garcia's sign. No dysmetria.      ASSESSMENT   61 year old right handed female with essential tremor.  S/p left side deep brain stimulator placement, phase I on 3/14/2017  and phase II on 3/24/2017.  S/p right side deep brain stimulator placement, phase I, placement of deep brain stimulator electrode, target left ventral intermediate nucleus of the thalamus with microelectrode recording on 3/23/2018.    Patient is doing well now.  She did have a week of being not herself in that she was tired.  She did not have any focal deficit or neurological deficits.  She continues to have some microlesion effect on the left side.    We discussed phase II of DBS surgery, placement of the DBS generator/battery over the right chest wall under general anesthesia.  Risks, benefits, alternative therapies were discussed with the patient, including but not limited to infection and bleeding. Surgical procedure was discussed in detail. All questions were answered, and she expressed understanding.       PLAN:  1. Deep brain stimulator placement, phase II, placement of deep brain stimulator generator/battery over the right chest wall - [employer]) generator/battery, smaller model.

## 2018-04-06 NOTE — IP AVS SNAPSHOT
Post Anesthesia Care Unit [address] [address]    Phone:  [phone]                                       After Visit Summary   4/6/2018    Hilary Cornelius    MRN: [id number]           After Visit Summary Signature Page     I have received my discharge instructions, and my questions have been answered. I have discussed any challenges I see with this plan with the nurse or doctor.    ..........................................................................................................................................  Patient/Patient Representative Signature      ..........................................................................................................................................  Patient Representative Print Name and Relationship to Patient    ..................................................               ................................................  Date                                            Time    ..........................................................................................................................................  Reviewed by Signature/Title    ...................................................              ..............................................  Date                                                            Time

## 2018-04-06 NOTE — IP AVS SNAPSHOT
MRN:[id number]                      After Visit Summary   4/6/2018    Hilary Cornelius    MRN: [id number]           Thank you!     Thank you for choosing [facility] for your care. Our goal is always to provide you with excellent care. Hearing back from our patients is one way we can continue to improve our services. Please take a few minutes to complete the written survey that you may receive in the mail after you visit with us. Thank you!        Patient Information     Date Of Birth          1955        About your hospital stay     You were admitted on:  April 6, 2018 You last received care in the:  Post Anesthesia Care Unit [facility]    You were discharged on:  April 6, 2018       Who to Call     For medical emergencies, please call 911.  For non-urgent questions about your medical care, please call your primary care provider or clinic, [phone]  For questions related to your surgery, please call your surgery clinic        Attending Provider     Provider Henok Alarcon MD Neurosurgery       Primary Care Provider Office Phone # Fax #    Zachary Holguin [phone]      Your next 10 appointments already scheduled     Apr 27, 2018 12:40 PM CDT   CT HEAD W/O CONTRAST with UCCT1   [facility] ([facility])    [address] [id number] [phone]           Please bring any scans or X-rays taken at other hospitals, if similar tests were done. Also bring a list of your medicines, including vitamins, minerals and over-the-counter drugs. It is safest to leave personal items at home.  Be sure to tell your doctor:   If you have any allergies.   If there s any chance you are pregnant.   If you are breastfeeding.  You do not need to do anything special to prepare for this exam.  Please wear loose clothing, such as a sweat suit or jogging clothes. Avoid snaps, zippers and other metal. We  may ask you to undress and put on a hospital gown.            2018  1:30 PM CDT   (Arrive by 1:15 PM)   Return Movement Disorder with Randell Flanagan MD   [facility] Neurology ([facility])    [address] [place] [id number] [phone]              Further instructions from your care team       [facility] [place]  Same-Day Surgery   Adult Discharge Orders & Instructions     For 24 hours after surgery    1. Get plenty of rest.  A responsible adult must stay with you for at least 24 hours after you leave the hospital.   2. Do not drive or use heavy equipment.  If you have weakness or tingling, don't drive or use heavy equipment until this feeling goes away.  3. Do not drink alcohol.  4. Avoid strenuous or risky activities.  Ask for help when climbing stairs.   5. You may feel lightheaded.  IF so, sit for a few minutes before standing.  Have someone help you get up.   6. If you have nausea (feel sick to your stomach): Drink only clear liquids such as apple juice, ginger ale, broth or 7-Up.  Rest may also help.  Be sure to drink enough fluids.  Move to a regular diet as you feel able.  7. You may have a slight fever. Call the doctor if your fever is over 100 F (37.7 C) (taken under the tongue) or lasts longer than 24 hours.  8. You may have a dry mouth, a sore throat, muscle aches or trouble sleeping.  These should go away after 24 hours.  9. Do not make important or legal decisions.   Call your doctor for any of the followin.  Signs of infection (fever, growing tenderness at the surgery site, a large amount of drainage or bleeding, severe pain, foul-smelling drainage, redness, swelling).    2. It has been over 8 to 10 hours since surgery and you are still not able to urinate (pass water).    3.  Headache for over 24 hours.    To contact a doctor, call Dr Javier's office at [phone]-- Neurosurgery or:    x    [phone] and ask for the resident on call for   Neurosurgery (answered 24 hours a day)  x   Emergency Department:    [facility]: [phone]       (TTY for hearing impaired: [phone])          You underwent surgery place a  battery replacement  by Henok Javier MD, PhD      - Your sutures are absorbable.     - Please take the full course of antibiotics as directed    - You will have follow up scheduled with the physician assistants and/or nurse practitioners in our clinic 2 weeks after your surgery.  If you live far away, you may see your primary care doctor for a wound check at 2 weeks.     - If you have not heard from our clinic about your follow up visit by 3-4 days following your discharge, please call our clinic at [phone] to schedule an appointment with the Neurosurgery teams.     After discharge, your activity restrictions are:   -We encourage short frequent walks, increasing as tolerated.  - No driving until you are seen in clinic and cleared by your neurosurgeon.  If you have had a seizure, you may not drive for at least 3 months according to [place] law.    - No strenuous activity.  - No lifting more than 10 pounds until you are seen in clinic (a gallon of milk weighs approximately 8 pounds)    Wound care  - You are ok to shower, but do not soak your incisions. Pat them dry if they get damp.   - Avoid coloring your hair or permanent styling until cleared by your surgeon  - No baths, hot tubs or pools for 4-6 weeks after surgery.       Call if you have any of the followin. Temperature greater than 101.5 F.   2. Any redness, swelling or discharge from the wound.   3. Any new weakness, numbness or altered mental status.  4. Worsening pain that is not improving with the pain medications you were prescribed.     Call [phone] or after 5:00 pm or on weekends call [phone] and ask for the neurosurgery resident on call. Thank You.       Additional Information     If you use  "hormonal birth control (such as the pill, patch, ring or implants): You'll need a second form of birth control for 7 days (condoms, a diaphragm or contraceptive foam). While in the hospital, you received a medicine called Bridion. Your normal birth control will not work as well for a week after taking this medicine.          Pending Results     No orders found from 4/4/2018 to 4/7/2018.            Admission Information     Date & Time Provider Department Dept. Phone    4/6/2018 Henok Javier MD Post Anesthesia Care Unit [facility] [phone]      Your Vitals Were     Blood Pressure Pulse Temperature Respirations Height Weight    126/94 89 98.6  F (37  C) 13 1.689 m (5' 6.5\") 88.2 kg (194 lb 7.1 oz)    Pulse Oximetry BMI (Body Mass Index)                97% 30.91 kg/m2          MyChart Information     [employer] gives you secure access to your electronic health record. If you see a primary care provider, you can also send messages to your care team and make appointments. If you have questions, please call your primary care clinic.  If you do not have a primary care provider, please call [phone] and they will assist you.        Care EveryWhere ID     This is your Care EveryWhere ID. This could be used by other organizations to access your [facility] medical records  [id number]        Equal Access to Services     JANIS TABARES : Hadii selina Forbes, waaxda luqadaha, qaybta kaalmadawna hinkle, ze fay. So [facility] [phone].    ATENCIÓN: Si habla español, tiene a paz disposición servicios gratuitos de asistencia lingüística. Llame al [phone].    We comply with applicable federal civil rights laws and [place] laws. We do not discriminate on the basis of race, color, national origin, age, disability, sex, sexual orientation, or gender identity.               Review of your medicines      START taking        Dose / Directions    clindamycin 150 MG capsule "   Commonly known as:  CLEOCIN   Used for:  S/P deep brain stimulator placement        Dose:  150 mg   Take 1 capsule (150 mg) by mouth 3 times daily for 7 days   Quantity:  21 capsule   Refills:  0         CONTINUE these medicines which have NOT CHANGED        Dose / Directions    acetaminophen 325 MG tablet   Commonly known as:  TYLENOL   Used for:  Essential tremor        Dose:  650 mg   Take 2 tablets (650 mg) by mouth every 4 hours as needed for other (surgical pain)   Quantity:  100 tablet   Refills:  0       albuterol 108 (90 BASE) MCG/ACT Inhaler   Commonly known as:  PROAIR HFA/PROVENTIL HFA/VENTOLIN HFA        Dose:  2 puff   Inhale 2 puffs into the lungs every 6 hours   Refills:  0       calcium carbonate 600 MG tablet   Commonly known as:  OS-GISELA 600 mg [place]. Ca        Dose:  600 mg   Take 600 mg by mouth daily   Refills:  0       cyanocobalamin 2500 MCG sublingual tablet   Commonly known as:  VITAMIN B-12        Dose:  2500 mcg   Place 2,500 mcg under the tongue daily   Refills:  0       ferrous fumarate 65 mg ([place]. FE)-Vitamin C 125 mg  MG Tabs tablet   Commonly known as:  VITRON C   Used for:  Essential tremor        Dose:  1 tablet   Take 1 tablet by mouth daily   Refills:  0       folic acid 400 MCG tablet   Commonly known as:  FOLVITE        Dose:  400 mcg   Take 400 mcg by mouth daily   Refills:  0       gabapentin 600 MG tablet   Commonly known as:  NEURONTIN   Used for:  Chronic midline low back pain without sciatica        Dose:  600 mg   Take 1 tablet (600 mg) by mouth 3 times daily   Quantity:  270 tablet   Refills:  3       Multiple vitamin Tabs        Dose:  1 tablet   Take 1 tablet by mouth daily   Refills:  0       oxyCODONE IR 5 MG tablet   Commonly known as:  ROXICODONE   Used for:  S/P deep brain stimulator placement        Dose:  5 mg   Take 1 tablet (5 mg) by mouth every 3 hours as needed for other (pain control or improvement in physical function. Hold dose for analgesic side  effects.)   Quantity:  56 tablet   Refills:  0       primidone 250 MG tablet   Commonly known as:  MYSOLINE        1 in am and 2 in the evening   Refills:  0       senna-docusate 8.6-50 MG per tablet   Commonly known as:  SENOKOT-S;PERICOLACE   Used for:  S/P deep brain stimulator placement        Dose:  2 tablet   Take 2 tablets by mouth 2 times daily   Quantity:  100 tablet   Refills:  0       TYLENOL PM EXTRA STRENGTH  MG tablet   Used for:  Essential tremor   Generic drug:  diphenhydrAMINE-acetaminophen        Dose:  2 tablet   Take 2 tablets by mouth nightly as needed for sleep   Refills:  0       ZICAM COLD REMEDY Tbdp   Used for:  Essential tremor        Dose:  1 tablet   Take 1 tablet by mouth 2 times daily   Refills:  0            Where to get your medicines      Some of these will need a paper prescription and others can be bought over the counter. Ask your nurse if you have questions.     Bring a paper prescription for each of these medications     clindamycin 150 MG capsule                Protect others around you: Learn how to safely use, store and throw away your medicines at www.disposemymeds.org.        ANTIBIOTIC INSTRUCTION     You've Been Prescribed an Antibiotic - Now What?  Your healthcare team thinks that you or your loved one might have an infection. Some infections can be treated with antibiotics, which are powerful, life-saving drugs. Like all medications, antibiotics have side effects and should only be used when necessary. There are some important things you should know about your antibiotic treatment.      Your healthcare team may run tests before you start taking an antibiotic.    Your team may take samples (e.g., from your blood, urine or other areas) to run tests to look for bacteria. These test can be important to determine if you need an antibiotic at all and, if you do, which antibiotic will work best.      Within a few days, your healthcare team might change or even stop your  antibiotic.    Your team may start you on an antibiotic while they are working to find out what is making you sick.    Your team might change your antibiotic because test results show that a different antibiotic would be better to treat your infection.    In some cases, once your team has more information, they learn that you do not need an antibiotic at all. They may find out that you don't have an infection, or that the antibiotic you're taking won't work against your infection. For example, an infection caused by a virus can't be treated with antibiotics. Staying on an antibiotic when you don't need it is more likely to be harmful than helpful.      You may experience side effects from your antibiotic.    Like all medications, antibiotics have side effects. Some of these can be serious.    Let you healthcare team know if you have any known allergies when you are admitted to the hospital.    One significant side effect of nearly all antibiotics is the risk of severe and sometimes deadly diarrhea caused by Clostridium difficile (C. Difficile). This occurs when a person takes antibiotics because some good germs are destroyed. Antibiotic use allows C. diificile to take over, putting patients at high risk for this serious infection.    As a patient or caregiver, it is important to understand your or your loved one's antibiotic treatment. It is especially important for caregivers to speak up when patients can't speak for themselves. Here are some important questions to ask your healthcare team.    What infection is this antibiotic treating and how do you know I have that infection?    What side effects might occur from this antibiotic?    How long will I need to take this antibiotic?    Is it safe to take this antibiotic with other medications or supplements (e.g., vitamins) that I am taking?     Are there any special directions I need to know about taking this antibiotic? For example, should I take it with  food?    How will I be monitored to know whether my infection is responding to the antibiotic?    What tests may help to make sure the right antibiotic is prescribed for me?      Information provided by:  www.cdc.gov/getsmart  U.S. Department of Health and Human Services  Centers for disease Control and Prevention  National Center for Emerging and Zoonotic Infectious Diseases  Division of Healthcare Quality Promotion             Medication List: This is a list of all your medications and when to take them. Check marks below indicate your daily home schedule. Keep this list as a reference.      Medications           Morning Afternoon Evening Bedtime As Needed    acetaminophen 325 MG tablet   Commonly known as:  TYLENOL   Take 2 tablets (650 mg) by mouth every 4 hours as needed for other (surgical pain)                                albuterol 108 (90 BASE) MCG/ACT Inhaler   Commonly known as:  PROAIR HFA/PROVENTIL HFA/VENTOLIN HFA   Inhale 2 puffs into the lungs every 6 hours                                calcium carbonate 600 MG tablet   Commonly known as:  OS-GISELA 600 mg [place]. Ca   Take 600 mg by mouth daily                                clindamycin 150 MG capsule   Commonly known as:  CLEOCIN   Take 1 capsule (150 mg) by mouth 3 times daily for 7 days                                cyanocobalamin 2500 MCG sublingual tablet   Commonly known as:  VITAMIN B-12   Place 2,500 mcg under the tongue daily                                ferrous fumarate 65 mg ([place]. FE)-Vitamin C 125 mg  MG Tabs tablet   Commonly known as:  VITRON C   Take 1 tablet by mouth daily                                folic acid 400 MCG tablet   Commonly known as:  FOLVITE   Take 400 mcg by mouth daily                                gabapentin 600 MG tablet   Commonly known as:  NEURONTIN   Take 1 tablet (600 mg) by mouth 3 times daily                                Multiple vitamin Tabs   Take 1 tablet by mouth daily                                 oxyCODONE IR 5 MG tablet   Commonly known as:  ROXICODONE   Take 1 tablet (5 mg) by mouth every 3 hours as needed for other (pain control or improvement in physical function. Hold dose for analgesic side effects.)                                primidone 250 MG tablet   Commonly known as:  MYSOLINE   1 in am and 2 in the evening                                senna-docusate 8.6-50 MG per tablet   Commonly known as:  SENOKOT-S;PERICOLACE   Take 2 tablets by mouth 2 times daily                                TYLENOL PM EXTRA STRENGTH  MG tablet   Take 2 tablets by mouth nightly as needed for sleep   Generic drug:  diphenhydrAMINE-acetaminophen                                ZICAM COLD REMEDY Tbdp   Take 1 tablet by mouth 2 times daily

## 2018-04-06 NOTE — OR NURSING
Dr. Kumar from neurosurgery returned page and stated that there really isn't any post procedure orders to place.  No orders received.

## 2018-04-06 NOTE — OP NOTE
DATE OF PROCEDURE:  04/06/2018    PREOPERATIVE DIAGNOSIS:    1.  Essential tremor.   2.  S/p left side deep brain stimulator placement, phase I on 3/14/2017 and phase II on 3/24/2017.  3.  S/p right side deep brain stimulator placement, phase I, placement of right side deep brain stimulator electrode, target right ventral intermediate nucleus of the thalamus, with microelectrode recording, on 3/23/2018.    POSTOPERATIVE DIAGNOSIS:    1.  Essential tremor.   2.  S/p left side deep brain stimulator placement, phase I on 3/14/2017 and phase II on 3/24/2017.  3.  S/p right side deep brain stimulator placement, phase I, placement of right side deep brain stimulator electrode, target right ventral intermediate nucleus of the thalamus, with microelectrode recording, on 3/23/2018.    PROCEDURES PERFORMED:   1.  Deep brain stimulator placement, phase II, placement of new deep brain stimulator generator/battery, model Infinity 5, over right chest wall.    2.  Placement of one extension wire and connection of the right side deep brain stimulator electrode, one array, to the new generator/battery.  3.  Electrical interrogation and analysis of deep brain stimulator system.  ATTENDING SURGEON:  Henok Javier MD, PhD     RESIDENT SURGEON:  Parmjit Kumar MD, PhD     ANESTHESIA:  General anesthesia.     ESTIMATED BLOOD LOSS:  3 mL     COMPLICATIONS:  None.     IMPLANTS:     1.  [employer] DBS generator/battery, Infinity 5, model number 6660ANS, serial number [id number].   2.  [employer]ott DBS extension wire, model number 6371ANS, serial number [id number].     FINDINGS:  Normal impedance testing of the new device.     INDICATIONS FOR PROCEDURE:  Ms. Hilary Cornelius returns for her ongoing DBS surgery.  She is a 63 year old female who is s/p right side deep brain stimulator placement, phase I, placement of deep brain stimulator electrode, target right ventral intermediate nucleus of the thalamus with microelectrode recording on 3/23/2018.   "The surgery was without complications and she was discharged on POD#1.  Patient reports some minor headaches, especially at the connector burial site and feeling tired.  This time, however, patient felt \"out of sorts\" and fatigued more so than her first side surgery that was done about a year ago.  She denies fevers, chills, nausea, vomiting, dizziness, weakness, numbness or seizure like activity.  She had asked to reschedule her phase II surgery so it was delayed.  She has an [employer] segmented DBS lead inserted.  She has been doing well, otherwise, and she does not report any issues with the incision.  Briefly, she has had tremor as long as she can remember even as a child.  She began getting treatment for her tremor 20 years ago with various medications including Propranolol, Topamax, and Sinemet. Currently she takes Gabapentin 900 mg/day and Primidone 750 mg/day.  She was evaluated at [facility] back in 1990s but she was not offered any treatment.  Her tremors have gotten worse in the past few years and it is socially embarrassing for her.  Alcohol may help her tremor.  She has a head tremor and a vocal as well as a hand tremor.  Her hand tremor affects her ability to write and she has a hard time writing and has a stamp.  She has to use both hands in holding a glass and has to cover cups and uses a straw.  She has to have her glass on the table and uses a straw.  She has problems keeping food on utensils.  She has had some problems with balance.  Her memory is fine.  She denies mood disorders.  She has a history of intermittent headaches.  Her goals for DBS is to be able to \"hold a glass of water without spilling, cut things without cutting my fingers into pieces, write my name like an adult and not like a [age]-year-old, and eat food without spilling all over.\"  She was seen by Laura Bonilla, Neurology APRN, on 11/09/2016 and was given a tremor score of 68.  She has a history of gastric " bypass and is on calcium vit b12 when remembers and folate.  She lives in [place], which is about 5 hours away.  Her neurologist in [place] is Dr. Ybarra who referred her for DBS.  She did have an excellent therapeutic effect from her left side DBS system and her case was again discussed at the Movement [employer] meeting and the second side surgery was recommended.  Patient is doing well now.  She did have a week of being not herself in that she was tired.  She did not have any focal deficit or neurological deficits.  She continues to have some microlesion effect on the left side.  We discussed phase II of DBS surgery, placement of the DBS generator/battery over the right chest wall under general anesthesia.  Risks, benefits, alternative therapies were discussed with the patient, including but not limited to infection and bleeding. Surgical procedure was discussed in detail. All questions were answered, and she expressed understanding.    DESCRIPTION OF PROCEDURE:  The patient was taken to the operating theater and positioned supine on the operating room table.  All pressure points were appropriately padded.  With placement of the endotracheal tube, general endotracheal anesthesia was induced.  Her head was turned to the left side, thus exposing the right parietal area of the head.  The previously implanted connector portion of the stimulating electrode from the right side could be palpated on the right side of the head.  A small area of hair was removed over this palpable connector using a surgical hair clipper.  Then the right side of the head, neck and chest area was prepped and draped in normal sterile fashion.  Local anesthetic was injected in the areas of intended incision at the right scalp and right chest wall as well as along the path of the intended tunneling.  A total of 20 mL of 1% lidocaine with epinephrine mixed with 0.25% Marcaine plain in a 50:50 combination was used.  A timeout was  performed confirming the patient's identity, procedure to be performed, site and side of surgery and administration of preoperative prophylactic antibiotics.      Attention was turned to the head.  A #15 blade scalpel was used to make a 2 cm skin incision at the distal end of the connector that was palpated in her scalp.  Hemostasis was achieved with bipolar cautery.  The incision was carried down to the pericranium using the Monopolar.  The mosquito was used to hold the protective cover, and we gently pulled out the connector.  This was found to be fully intact.  At that point, a pocket was made using a Jessica clamp down toward behind the ear into the nuchal line.  This was in preparation for tunneling of the extension wire.    At that point, attention was turned to the right chest wall area.  A #10 blade scalpel was used to make a 6-7 cm incision on the right chest wall.  A #10 blade scalpel was used to finish dissection down to the proper plane less than 1 cm below the skin.  We found a nice dissecting plane superficial to the pectoralis muscle.  A combination of blunt dissection as well as the monopolar cautery with a mosquito was used to establish a subcutaneous pocket about 3 fingerbreadths wide and deep enough to encompass the full length of the fingers.  Hemostasis was achieved with monopolar cautery.  The pocket was copiously irrigated with antibiotic-impregnated saline and sponges were placed in the pocket.      At this point, a tunneler was brought into the field.  We tunnel a passage from the head incision to the chest wall incision.  Care was taken to stay superficial and the path of the tunneler was confirmed to be over the clavicle.  The tunneler ware removed, leaving behind a plastic sheath.  One extension wire was passed from the head incision to the chest incision.  Subsequently, the plastic sheath was pulled out from the chest incision, leaving behind the tunneled extension wire.    We then  proceeded to make the connection between the right intracranial lead and the extension wire.  The protective covering was removed from the connector portion of the stimulating electrode.  The contacts were inspected to be clean and without damage.  Then, the stimulating electrode was inserted into the extension wire connection.  The connection was secured with screwdriver.  Then, the extension wire was gently pulled from the chest incision and the excess wire length towards the head was also buried superiorly until the connector was within the incision.  Then, the connector was buried further superiorly in the incision until it was no longer directly under the incision.    At this time, a new Infinity 5 generator/battery was brought onto the field.  The extension wire was then cleaned at its contacts and inserted into the generator/battery portal.  The extension wire for the right sided implant was placed into the back connector portal.  A plug was placed in the front connector portal.  These were secured with a screwdriver.  The previously placed sponges were taken out of the pocket.  The pocket was inspected to be clean and no active bleeding was noted.  Then, the new generator/battery with the excess wires being placed posterior to the generator/battery was placed within the right chest wall pocket that was created previously.  The entire apparatus fit into the pocket comfortably.  The generator/battery was anchored to the pocket using two 2-0 Ethibond sutures.  Therefore, one electrode array was connected to the generator/battery.    With proper placement of the connection of the deep brain stimulator system, wireless interrogation and analysis was performed.  All the impedance values were noted to be within normal.  No problems were found.      With the satisfactory placement of the new system, we began closing the wound.  The right chest incision was closed in the following manner.  The deep pocket was  reapproximated using 3-0 Vicryl sutures in an inverted interrupted fashion.  The subcutaneous fat layer was reapproximated using 3-0 Vicryl sutures in an inverted interrupted fashion.  The dermal layer was reapproximated using 3-0 Vicryl sutures in an inverted interrupted fashion.  The skin was reapproximated using 4-0 Monocryl suture in a subcuticular fashion.  The right parietal head incision was irrigated with antibiotic solution and dried.  Meticulous hemostasis was obtained.  It was then closed in the following manner.  The galea was reapproximated over the implanted hardware using 3-0 Vicryl sutures in an inverted interrupted fashion.  This provided a protective layer.  The skin was then reapproximated using 4-0 Monocryl suture in a running fashion.  Both wounds were cleaned and dried.  ChoraPrep was used to clean the incisions again.  Then, Dermabond was applied.    We again performed wireless interrogation and analysis of the entire deep brain stimulator system.  All impedances were noted to be within normal and no problems were found.      At the end of the case, all counts including needle, sponge and instrument counts were correct x 2.  There were no complications.  Patient was extubated and taken to the recovery room in a stable condition.    Dr. Chavez was present and scrubbed throughout the entirety of the case.      HENOK CHAVEZ MD      As dictated by ARIN HANNAH MD, PHD       NEUROSURGERY ATTENDING ATTESTATION: Henok JACOBSEN M.D., Ph.D., Neurosurgery Attending, was present and scrubbed for the entire case and performed the key and critical portions of the case.      D: 2018   T: 2018   MT: JUAN     Name:     RONY MARTIN   MRN:      [id number]        Account:        [id number]   :      1955           Procedure Date: 2018     Document: [id number]

## 2018-04-06 NOTE — ANESTHESIA CARE TRANSFER NOTE
Patient: Hilary Cornelius    Procedure(s):  Deep Brain Stimulator Placement, Phase II, Placement Of Deep Brain Stimulator Generator/Battery Over The Right Chest Wall - Wound Class: I-Clean    Diagnosis: Essential Tremor   Diagnosis Additional Information: No value filed.    Anesthesia Type:   General, ETT     Note:  Airway :Face Mask  Patient transferred to:PACU  Comments: VSS. Ventilating well.Handoff Report: Identifed the Patient, Identified the Reponsible Provider, Reviewed the pertinent medical history, Discussed the surgical course, Reviewed Intra-OP anesthesia mangement and issues during anesthesia, Set expectations for post-procedure period and Allowed opportunity for questions and acknowledgement of understanding      Vitals: (Last set prior to Anesthesia Care Transfer)    CRNA VITALS  4/6/2018 0959 - 4/6/2018 1040      4/6/2018             Resp Rate (observed): (!)  1                Electronically Signed By: LUDWIG Malhotra CRNA  April 6, 2018  10:40 AM

## 2018-04-06 NOTE — OR NURSING
Resident Dr. SATISH Kumar with neurosurgery text paged the following message:  Please place post procedure orders for Hilary Siddiqui

## 2018-04-06 NOTE — ANESTHESIA PREPROCEDURE EVALUATION
Anesthesia Evaluation     . Pt has had prior anesthetic. Type: MAC           ROS/MED HX    ENT/Pulmonary:     (+)Intermittent asthma Treatment: Inhaler prn,  , . .    Neurologic:     (+)Parkinson's disease     Cardiovascular:         METS/Exercise Tolerance:     Hematologic:  - neg hematologic  ROS       Musculoskeletal:  - neg musculoskeletal ROS       GI/Hepatic:         Renal/Genitourinary:  - ROS Renal section negative       Endo:         Psychiatric:  - neg psychiatric ROS       Infectious Disease:  - neg infectious disease ROS       Malignancy:      - no malignancy   Other:                     Physical Exam  Normal systems: cardiovascular and pulmonary    Airway   Mallampati: III  TM distance: >3 FB  Neck ROM: full    Dental   (+) chipped    Cardiovascular       Pulmonary                     Anesthesia Plan      History & Physical Review  History and physical reviewed and following examination; no interval change.    ASA Status:  3 .    NPO Status:  > 8 hours    Plan for General and ETT with Propofol induction. Maintenance will be TIVA.    PONV prophylaxis:  Ondansetron (or other 5HT-3) and Dexamethasone or Solumedrol       Postoperative Care  Postoperative pain management:  IV analgesics.      Consents  Anesthetic plan, risks, benefits and alternatives discussed with:  Patient.  Use of blood products discussed: No .   .                          .

## 2018-04-06 NOTE — DISCHARGE INSTRUCTIONS
[facility]  Same-Day Surgery   Adult Discharge Orders & Instructions     For 24 hours after surgery    1. Get plenty of rest.  A responsible adult must stay with you for at least 24 hours after you leave the hospital.   2. Do not drive or use heavy equipment.  If you have weakness or tingling, don't drive or use heavy equipment until this feeling goes away.  3. Do not drink alcohol.  4. Avoid strenuous or risky activities.  Ask for help when climbing stairs.   5. You may feel lightheaded.  IF so, sit for a few minutes before standing.  Have someone help you get up.   6. If you have nausea (feel sick to your stomach): Drink only clear liquids such as apple juice, ginger ale, broth or 7-Up.  Rest may also help.  Be sure to drink enough fluids.  Move to a regular diet as you feel able.  7. You may have a slight fever. Call the doctor if your fever is over 100 F (37.7 C) (taken under the tongue) or lasts longer than 24 hours.  8. You may have a dry mouth, a sore throat, muscle aches or trouble sleeping.  These should go away after 24 hours.  9. Do not make important or legal decisions.   Call your doctor for any of the followin.  Signs of infection (fever, growing tenderness at the surgery site, a large amount of drainage or bleeding, severe pain, foul-smelling drainage, redness, swelling).    2. It has been over 8 to 10 hours since surgery and you are still not able to urinate (pass water).    3.  Headache for over 24 hours.    To contact a doctor, call Dr Javier's office at [phone]-- Neurosurgery or:    x   [phone] and ask for the resident on call for   Neurosurgery (answered 24 hours a day)  x   Emergency Department:    [facility]: [phone]       (TTY for hearing impaired: [phone])          You underwent surgery place a  battery replacement  by Henok Javier MD, PhD      - Your sutures are absorbable.     - Please take the full course of antibiotics  as directed    - You will have follow up scheduled with the physician assistants and/or nurse practitioners in our clinic 2 weeks after your surgery.  If you live far away, you may see your primary care doctor for a wound check at 2 weeks.     - If you have not heard from our clinic about your follow up visit by 3-4 days following your discharge, please call our clinic at [phone] to schedule an appointment with the Neurosurgery teams.     After discharge, your activity restrictions are:   -We encourage short frequent walks, increasing as tolerated.  - No driving until you are seen in clinic and cleared by your neurosurgeon.  If you have had a seizure, you may not drive for at least 3 months according to [place] law.    - No strenuous activity.  - No lifting more than 10 pounds until you are seen in clinic (a gallon of milk weighs approximately 8 pounds)    Wound care  - You are ok to shower, but do not soak your incisions. Pat them dry if they get damp.   - Avoid coloring your hair or permanent styling until cleared by your surgeon  - No baths, hot tubs or pools for 4-6 weeks after surgery.       Call if you have any of the followin. Temperature greater than 101.5 F.   2. Any redness, swelling or discharge from the wound.   3. Any new weakness, numbness or altered mental status.  4. Worsening pain that is not improving with the pain medications you were prescribed.     Call [phone] or after 5:00 pm or on weekends call [phone] and ask for the neurosurgery resident on call. Thank You.

## 2018-04-10 ENCOUNTER — CARE COORDINATION (OUTPATIENT)
Dept: NEUROSURGERY | Facility: CLINIC | Age: 63
End: 2018-04-10

## 2018-04-10 NOTE — PROGRESS NOTES
Pt is s/p DBS battery placement surgery on 4/6/18. Left  to check on pt's post op status. Will follow up if I do not hear back. Left my direct phone number.

## 2018-04-12 NOTE — PROGRESS NOTES
Called pt again to check on her status following DBS battery placement surgery on 4/6/18. I let her know I will be out of the office tomorrow, but she can call our main number at [phone] and can be transferred to one of my RN colleagues. If she has concerns after regular business hours or over the weekend, she may call [phone] and ask to speak with the neurosurgery resident on call. I will try to connect with pt again next week.

## 2018-04-18 ENCOUNTER — TELEPHONE (OUTPATIENT)
Dept: NEUROLOGY | Facility: CLINIC | Age: 63
End: 2018-04-18

## 2018-04-18 NOTE — TELEPHONE ENCOUNTER
----- Message from Ivana Martin RN sent at 4/18/2018 12:42 PM CDT -----  Regarding: FW: 4/27 appt with Dr. Flanagan  Please call patient. Let her know not to take her primidone the morning of the appointment. See the letter I sent her on 3/20.    Thank you!  Ivana  ----- Message -----     From: Brooklyn Oliveira RN     Sent: 4/18/2018   8:28 AM       To: Ivana Martin RN, #  Subject: 4/27 appt with Dr. Flanagan                    Hi, all.    Pt said she got a message about her 4/27 appt with Dr. Flanagan, but isn't sure who it was from. I'm not sure she saved it. If one of you called her yesterday, can you try again today? She said if you don't get her on her home number, you can reach her on her cell.    Thanks,  Brooklyn

## 2018-04-18 NOTE — TELEPHONE ENCOUNTER
Attempting to reach patient via phone to advise her not to take her Primidone the morning of her upcoming appointment.  Left message asking for a callback.  Left callback number.

## 2018-04-18 NOTE — TELEPHONE ENCOUNTER
[facility] Call Center    Phone Message    May a detailed message be left on voicemail: yes    Reason for Call: Other: Pt calling asking for a call back, there was a VM left on her phone about upcoming apt with Dr. Flanagan, Pt not sure if the apt needs to be rescheduled but would like to speak to a nurse about message.  Pt can be reacehed at Home number before 12pm after that please call cell phone after Noon.      Action Taken: Message routed to:  [facility] ([facility]

## 2018-04-18 NOTE — PROGRESS NOTES
Neurosurgery Discharge Coordination Note     Attending physician: Dr. Javier  Surgery performed: DBS battery placement   Date of Discharge: 4/6/18  Discharge to: Home     Current status: Patient states she feels tired still, but rests when needed. She still has some soreness along the side of her neck, but it is improving slowly. Taking Tylenol as needed with adequate relief. Denies redness, swelling, increased tenderness, drainage, incision opening or elevated temp. Reports Incision CDI without signs of infection.  Denies current bowel or bladder issues.    Discharge instructions and medications reviewed with patient.  Follow up appointments/imaging/tests needed: 2 week post op with PCP for wound check. Pt said she received a message about her 4/27 appt with Dr. Flanagan but isn't sure who to call back. I would think this message came from either RN Ivana Martin or neurology schedulers, so I have messaged both with a request to call pt again. No further questions at this time.     RN triage/on call number given: [phone]/ [phone]

## 2018-04-20 NOTE — PROCEDURES
Intraoperative DAYO and test stimulation for DBS placement    Procedure Note    Patient Name: Hilary Cornelius  MRN: [id number]  Date of Service: .3/23/2018    Procedures: 1. Intraoperative microelectrode recording for guidance of deep brain stimulator lead placement. 2. Test microstimulation from the microelectrode. 3. Test macrostimulation from the microelectrode canula and from the implanted DBS lead.    Physician performing procedure: Orquidea Armijo MD    Surgical Procedure: Unilateral ventral intermediate (Vim) nucleus of the thalamus deep brain stimulator placement.    Surgeon: Henok Javier MD, PhD    Patient identification: Hilary Cornelius is a 63-year-old woman with essential tremor that is causing significant functional disability despite optimal medication management.  She previously underwent left Vim DBS placement on 3/14/2017 with excellent control of her right hand tremor. Today she is is undergoing right Vim DBS for treatment of refractory left hand tremor.      Description of Procedure:  Prior to surgery the patient underwent stereotactic MR scanning for localization of landmarks to guide targeting of the ventral intermediate nucleus of the thalamus. The images were imported into the Stealth system for computation and reformatting and trajectory planning. Standard indirect targeting of the thalamus was used for initial targeting. The trajectory was planned to the target on T1 weighted gadolinium enhanced images that avoided the ventricles, sulci, and cortical veins as visualized by the gadolinium.     The morning of surgery the CRW stereotactic head frame was placed and the patient underwent stereotactic CT scanning with the localizer box in place. The images were then imported into the Stealth system for computational fusion with the previously described MRI scan.     After the surgical procedure was initiated and the bur hole was made by Dr. Henok Javier, intraoperative microelectrode  recording was performed. The right side was mapped and implanted. Two simultaneous microelectrode penetrations were made using the Ronan-Gun device. Microelectrode recording was performed using the center (aimed 2 mm posterior to the anatomic target as determined by Stealth system planning) and 2 mm posterior positions of the Ronan-Gun system. The anterior position of the Ronan-Gun system would correlate with the initial anatomic target as defined by the Stealth station. The center and posterior Ronan-Gun system positions were mapped in order to increase the likelihood of identifying the anterior border of the sensory (Vc) nucleus, which corresponds to the posterior border of the Vim nucleus.    Of note: due to the historical finding that targeting has been slightly more posterior than anticipated based on initial target estimates, we made a 1 mm anterior frameshift prior to initiation of the case. The above description is the procedure after this 1 mm anterior frameshift from the initial Stealth coordinates was performed.    Center track:     Thalamic neuronal activity was noted between 14.7 mm above and 1.5 mm below target. A couple of bursting cells were identified at 6.3 and 6.0 mm above target. Areas of increased background and density were noted between 11 and 10 mm above target ant between 2 and 1 mm above target. Numerous cells were tested for response to passive and active movement throughout the track with no frequency modulation response.    Posterior track:    Thalamic neuronal activity was noted from initial entry at 15 mm above target until approximately 3 mm below target. Multiple thalamic bursting cells were noted between 11.2 mm and 5.8 mm above target. Tremor frequency cells were appreciated at 8.9 mm above, 3.5 mm above, and 3.1 mm above target. Movement and sensory cell responses are detailed below.    Depth (mm) Location/movement Comments   8.9  Tremor frequency   7.2 Passive elbow    5.7 Tongue  retraction    4.3 Passive wrist    3.5 Voluntary jaw Tremor frequency - jaw   3.2 Voluntary jaw Tremor frequency - jaw   2.6 Tongue protrusion    2.5 Tongue  Deep pressure   2.2 Tongue protrusion, jaw closing    1.1 Tactile left face (upper lip and adjacent to nose)    0.8 Tactile left face (upper lip and adjacent to nose)    0.4 Tactile tongue    -0.2 Tactile tongue    -0.5 Tactile tongue    -1.0 Tactile tongue    -1.5 Tactile tongue    -2.0 Distant tactile tongue    -2.5 Deep pressure tongue          Microstimulation from the microelectrode:    We then proceeded to do test micostimulation from the microelectrode tip as detailed below:      Depth Center track Posterior track   -3.617 mm Paresthesias Left 3rd-5th digits at 0.03 mA 1/10 paresthesias 3rd-5th digits at 0.07 mA   -1.6 mm Paresthesias Left 3rd-5th digits (no mouth) at 0.05 mA Paresthesias Left 3rd-5th digits and mouth at 0.08 mA   -1.0 mm Paresthesias Left 3rd-5th digits at 0.06 mA Paresthesias (5/10) L 3rd-5th digits and mouth at 0.08 mA   At target No side effects to 0.1mA Tingling in the mouth at 0.03 mA            Macrostimulation from the microelectrode (3 mm from the tip):    Test macrostimulation from the microelectrode was next performed at target from the center position. At a pulse width 60 microseconds, 130 Hz, 0.3 mA she reported slight tingling of the fingers and decreased tremor. At 0.5 mA she had 5/10 tingling of the fingers and thumb.    Based on these microstimulation and macrostimulation findings from the microelectrode, the anterior position of the Ronan-Gun system is thought to be a good location for DBS lead placement. Thus, an Abbott Infinity directional lead with 0.5 mm electrode spacing ([id number]) was placed in the anterior Ronan-Gun position with the tip at 3.5 mm below target.    Lesion effect: A mild lesion effect with reduced amplitude of left hand tremor was noticed after lead placement..    Test macrostimulation from the lead was  then performed with 10 negative, 11 positive, 60 microseconds, 130 Hz, and she had complete control of tremor started at 1.0 mA. She reported no side effects up until 5.0 mA.  At 9 negative, 10 positive, 60 microseconds, 130 Hz, she had no tremor and no side effect up to 5.1 mA, with similar results at 90 microseconds.     Given the high side effect thresholds the decision was made to move the lead 1 mm deeper. The  Abbott Infinity directional lead ([id number]) was then secured into place with the tip at 4.5 mm below target.  .    The patient tolerated the procedure without difficulty and there were no complications.    Physician time: A total of 2 hours and 25 minutes was spent to perform the above bilateral intraoperative test macrostimulation.    Orquidea Armijo MD    of Neurology   Movement Disorders Division

## 2018-04-27 ASSESSMENT — ENCOUNTER SYMPTOMS
TINGLING: 0
DECREASED APPETITE: 0
PARALYSIS: 0
MYALGIAS: 1
JOINT SWELLING: 0
CHILLS: 0
MUSCLE WEAKNESS: 1
SEIZURES: 0
MUSCLE CRAMPS: 1
INCREASED ENERGY: 1
SPEECH CHANGE: 0
FEVER: 0
FATIGUE: 1
WEIGHT LOSS: 0
LOSS OF CONSCIOUSNESS: 0
HEADACHES: 0
HALLUCINATIONS: 0
NECK PAIN: 1
ALTERED TEMPERATURE REGULATION: 0
NUMBNESS: 0
BACK PAIN: 1
MEMORY LOSS: 0
POLYPHAGIA: 0
DISTURBANCES IN COORDINATION: 0
STIFFNESS: 1
WEIGHT GAIN: 0
NIGHT SWEATS: 0
ARTHRALGIAS: 1
TREMORS: 1
WEAKNESS: 1
DIZZINESS: 1
POLYDIPSIA: 0

## 2018-05-02 ENCOUNTER — OFFICE VISIT (OUTPATIENT)
Dept: NEUROLOGY | Facility: CLINIC | Age: 63
End: 2018-05-02
Payer: COMMERCIAL

## 2018-05-02 ENCOUNTER — HOSPITAL ENCOUNTER (OUTPATIENT)
Dept: CT IMAGING | Facility: CLINIC | Age: 63
Discharge: HOME | End: 2018-05-02
Attending: PSYCHIATRY & NEUROLOGY | Admitting: PSYCHIATRY & NEUROLOGY
Payer: COMMERCIAL

## 2018-05-02 VITALS
SYSTOLIC BLOOD PRESSURE: 132 MMHG | HEIGHT: 66 IN | DIASTOLIC BLOOD PRESSURE: 63 MMHG | BODY MASS INDEX: 31.66 KG/M2 | HEART RATE: 79 BPM | WEIGHT: 197 LBS

## 2018-05-02 DIAGNOSIS — G25.0 ESSENTIAL TREMOR: ICD-10-CM

## 2018-05-02 DIAGNOSIS — Z82.0 FAMILY HISTORY OF BENIGN ESSENTIAL TREMOR: Primary | ICD-10-CM

## 2018-05-02 PROCEDURE — 70450 CT HEAD/BRAIN W/O DYE: CPT

## 2018-05-02 NOTE — MR AVS SNAPSHOT
After Visit Summary   5/2/2018    Hilary Cornelius    MRN: [id number]           Patient Information     Date Of Birth          1955        Visit Information        Provider Department      5/2/2018 1:30 PM Randell Flanagan MD [facility] Neurology        Today's Diagnoses     Family history of benign essential tremor    -  1       Follow-ups after your visit        Follow-up notes from your care team     Return in about 9 months (around 2/2/2019), or if symptoms worsen or fail to improve, for recheck.      Your next 10 appointments already scheduled     Nov 07, 2018  1:30 PM CST   (Arrive by 1:15 PM)   Return Movement Disorder with Randell Flanagan MD   [facility] Neurology ([facility])    [address] [address] [phone]              Who to contact     Please call your clinic at [phone] to:    Ask questions about your health    Make or cancel appointments    Discuss your medicines    Learn about your test results    Speak to your doctor            Additional Information About Your Visit        MyChart Information     [employer] gives you secure access to your electronic health record. If you see a primary care provider, you can also send messages to your care team and make appointments. If you have questions, please call your primary care clinic.  If you do not have a primary care provider, please call [phone] and they will assist you.      [facility] is an electronic gateway that provides easy, online access to your medical records. With [facility], you can request a clinic appointment, read your test results, renew a prescription or communicate with your care team.     To access your existing account, please contact your [facility] or call [phone] for assistance.        Care EveryWhere ID     This is your Care EveryWhere ID. This could be used by other organizations to access your  "[facility] medical records  [id number]        Your Vitals Were     Pulse Height BMI (Body Mass Index)             79 1.676 m (5' 6\") 31.8 kg/m2          Blood Pressure from Last 3 Encounters:   05/02/18 132/63   04/06/18 139/77   03/24/18 97/53    Weight from Last 3 Encounters:   05/02/18 89.4 kg (197 lb)   04/06/18 88.2 kg (194 lb 7.1 oz)   03/23/18 87.6 kg (193 lb 2 oz)              Today, you had the following     No orders found for display       Primary Care Provider Office Phone # Fax #    Zachary Holguin [phone]       [facility] [address]  [facility] [address]        Equal Access to Services     [facility] : Hadii selina zelayao Sodeangelo, waaxda luqadaha, qaybta kaalmada adeegyada, ze brown . [facility] [phone].    ATENCIÓN: Si habla español, tiene a paz disposición servicios gratuitos de asistencia lingüística. Abel al [phone].    We comply with applicable federal civil rights laws and [place] laws. We do not discriminate on the basis of race, color, national origin, age, disability, sex, sexual orientation, or gender identity.            Thank you!     Thank you for choosing [facility] NEUROLOGY  for your care. Our goal is always to provide you with excellent care. Hearing back from our patients is one way we can continue to improve our services. Please take a few minutes to complete the written survey that you may receive in the mail after your visit with us. Thank you!             Your Updated Medication List - Protect others around you: Learn how to safely use, store and throw away your medicines at www.disposemymeds.org.          This list is accurate as of 5/2/18  4:04 PM.  Always use your most recent med list.                   Brand Name Dispense Instructions for use Diagnosis    acetaminophen 325 MG tablet    TYLENOL    100 tablet    Take 2 tablets (650 mg) by mouth every 4 hours as needed for other (surgical pain)    Essential " tremor       albuterol 108 (90 Base) MCG/ACT Inhaler    PROAIR HFA/PROVENTIL HFA/VENTOLIN HFA     Inhale 2 puffs into the lungs every 6 hours        calcium carbonate 600 MG tablet    OS-GISELA 600 mg Belkofski. Ca     Take 600 mg by mouth daily        cyanocobalamin 2500 MCG sublingual tablet    VITAMIN B-12     Place 2,500 mcg under the tongue daily        ferrous fumarate 65 mg (Belkofski. FE)-Vitamin C 125 mg  MG Tabs tablet    VITRON C     Take 1 tablet by mouth daily    Essential tremor       folic acid 400 MCG tablet    FOLVITE     Take 400 mcg by mouth daily        gabapentin 600 MG tablet    NEURONTIN    270 tablet    Take 1 tablet (600 mg) by mouth 3 times daily    Chronic midline low back pain without sciatica       Multiple vitamin Tabs      Take 1 tablet by mouth daily        oxyCODONE IR 5 MG tablet    ROXICODONE    56 tablet    Take 1 tablet (5 mg) by mouth every 3 hours as needed for other (pain control or improvement in physical function. Hold dose for analgesic side effects.)    S/P deep brain stimulator placement       primidone 250 MG tablet    MYSOLINE     1 in am and 2 in the evening        senna-docusate 8.6-50 MG per tablet    SENOKOT-S;PERICOLACE    100 tablet    Take 2 tablets by mouth 2 times daily    S/P deep brain stimulator placement       TYLENOL PM EXTRA STRENGTH  MG tablet   Generic drug:  diphenhydrAMINE-acetaminophen      Take 2 tablets by mouth nightly as needed for sleep    Essential tremor       ZICAM COLD REMEDY Tbdp      Take 1 tablet by mouth 2 times daily    Essential tremor

## 2018-05-02 NOTE — LETTER
"2018       RE: Hilary Cornelius  [address]   [facility] [address]     Dear Colleague,    Thank you for referring your patient, Hilary Cornelius, to the [facility] NEUROLOGY at [facility]. Please see a copy of my visit note below.           PROCEDURE NOTE    PCP: Zachary Holguin  Referring Provider: Dr. Jus Ybarra  Patient: Hilary Cornelius  : 1955  DEO: May 2, 2018    Vital Signs: /63, P 79, Weight 197 lb    Chief Complaint: tremors    HPI: The HPI is described in the following notes:  ------------------------------------------------------------------  1. Dr. Keith's EPIC progress Note dated 2016  2. Laura Bonilla's EPIC Progress Note dated 2016      3. Dr. Javier's H&P and EPIC Progress Note dated 11/10/2016  4. Ivana Martin's Candidacy Form discussed on 2016  5. My EPIC Progress Notes dated 2017 and 2017   6. Dr Hernandez's [facility] notes dated 2017, 2017, and 2017  7. Dr. Javier's EPIC Op note dated 2018    The patient is S/P left-side DBS electrode placement on 2017 and generator/battery on 3/24/2017.  He is also S/P right-side DBS electrode placement on 2018 and generator/battery 2018.     Additional information provided during today's clinic visit: two weeks suppression of RUE tremors (microlesioning effect) following implantation of the Left DBS lead.    Tremor Checklist  -----------------------  Handedness: Right handed  Speech: No quaver (\"went away when Right IPG was turned on\")  Resting tremor: No  Postural tremor: Proximal and distal LUE tremor, distal > proximal   Kinetic tremor: Head tremor: Yes-yes head tremor-- intermittent, increased when fatigued  Finger-to-nose testing (FTN): Provokes significant action and terminal intention tremors in LUE,   Sequential finger tapping (SFT): Can perform with both UEs though contaminated by a kinetic tremor, L = R   Drinking from a cup: " "Can drink water from a cup using her right hand, cannot do it with her left hand   Rapid alternating movements: Well performed (mu-cake)  Toe tapping: Well performed with right foot, slow and slightly arrhythmic with the left foot   Station and Gait: Not stooped, deliberate, normal stride and pace, evoked tremor in both hands while walking, little if any arm swing bilaterally  Pivot turns: Marked imbalance, afraid of falling      ROS: The patient has completed a Neuroscience Services Patient Health History including a 14-system review that can be viewed in EPIC.    Relevant Medications:  --------------------------   Primidone 250 m tab in the morning, 2 tabs in the evening, last taken at ~8:00 pm last night  Gabapentin 600 m tab tid, last taken at ~8:00 pm last night     Allergies: Penicillin, bee stings, and Aspirin    PMH: See my progress note dated 2017.    PSH: See my progress note dated 2017.    FH:  See my progress note dated 2017.    SH: The patient drinks ~two alcoholic beverages/week, and this \"helps her tremor\".     Program 1  -------------  Left Vim battery status:  2.96 volts  Left Vim initial parameter values: c+/2B-, 3.0 mA, 90 usec, 130 Hz  Therapy Impedance: 950 Ohms  Therapy current: 3.00 mA   Allocated current range: from 2.50 to 3.00 mA     Left Vim final parameter values: c+/2B-, 3.25 mA, 90 usec, 130 Hz  Program impedance: 937 Ohms  Therapy current: 3.25 mA  Allocated current range: no range provided    -------------------------------------------------------    Right Vim battery status: >3.00 volts  Right Vim final settings: c+/10ABC-, 1.00 mA, 60 usec, 130 Hz (see below)  Program impedance: 1087 Ohms  Therapy current: 1.00 mA  Allocated current range: no range provided           REPROGRAMMING LEFT IPG     Left Vim initial settings: c+/2B-, 3.00 mA, 90 usec, 130 Hz (baseline above)  Increase current to 3.25 mA, other parameter values unchanged -> increased tremor " "suppression, able to drink from a cup without spilling contents, no side effects           INITIAL PROGRAMMING RIGHT IPG    Right Vim settings: c+/10ABC-, 0.00 mA, 60 usec, 130 Hz  Increase current to 0.75 mA, other parameter values unchanged -> slight improvement in FTN no side effects   Increase current to 1.00 mA, other parameter values unchanged -> transient \"electrical shock\", improvement in FTN and further tremor suppression     Increase current to 1.25 mA, other parameter values unchanged -> \"fingers feel funny\" (upper threshold): pw = 60 usc, program impedance 1087 Ohms    Right Vim settings: c+/11BC-, 1.000 mA, 60 usec, 130 Hz  Increase current to 0.75 mA, other parameter values unchanged -> slight improvement in FTN, no side effects, no tremor suppression  Increase current to 1.00 mA, other parameter values unchanged -> further improvement in FTN , significant tremor suppression, no side efffects  Increase current to 1.25 mA, other parameter values unchanged -> slight if any further improvement in FTN or tremor suppression   Increase current to 0.50 mA, other parameter values unchanged -> \"feels like an electrical wire\" (upper threshold)      Assessment  --------------------  1. The patient has a family history of ET, disabling tremors in both UEs, marginal, if any, response to primidone, imbalance when walking or turning.  2. In program #1 (Right Vim DBS) she has low-current thresholds for sensory side effects.  3. Significant RUE tremor suppression was achieved with both LEFT and RIGHT Vim DBS.   4. The patients complaint of EDS and imbalance may be due to gabapentin and primidone since more than 20% of adult patients taking high-dose gabapentin suffer from somnolence, and a smaller percentage of patients suffer from dizziness and ataxia.    Plan  -------  1. Directional programming may allow for higher current thresholds.  2. The patient will be followed by Dr. Hernandez in [place].   3. Suggest reducing or " tapering off gabapentin to relieve EDS.   4. RTC in 9 months for battery check and reprogramming if necessary.    I spent two hours with the patient and her , and the entire time was spent reprogramming the patient's Right IPG, performing a monopolar survey of electrodes 10 and 11 in the Left IPG, and optimizing the parameter values of both IPGs.  Movement-disorders targets were identified in both UEs prior to programming.    Segundo Mcdonough ([place]), MD  [facility]    cc: Josseline Hernandez MD

## 2018-05-02 NOTE — NURSING NOTE
Chief Complaint   Patient presents with     RECHECK     Movement Initial programming      Lori Blackman

## 2018-05-02 NOTE — PROGRESS NOTES
"       PROCEDURE NOTE    PCP: Zachary Holguin  Referring Provider: Dr. Jus Ybarra  Patient: Hilary Cornelius  : 1955  DEO: May 2, 2018    Vital Signs: /63, P 79, Weight 197 lb    Chief Complaint: tremors    HPI: The HPI is described in the following notes:  ------------------------------------------------------------------  1. Dr. Keith's EPIC progress Note dated 2016  2. Laura Bonilla's EPIC Progress Note dated 2016      3. Dr. Javier's H&P and EPIC Progress Note dated 11/10/2016  4. Ivana Martin's Candidacy Form discussed on 2016  5. My EPIC Progress Notes dated 2017 and 2017   6. Dr Hernandez's [facility] notes dated 2017, 2017, and 2017  7. Dr. Javier's EPIC Op note dated 2018    The patient is S/P left-side DBS electrode placement on 2017 and generator/battery on 3/24/2017.  He is also S/P right-side DBS electrode placement on 2018 and generator/battery 2018.     Additional information provided during today's clinic visit: two weeks suppression of RUE tremors (microlesioning effect) following implantation of the Left DBS lead.    Tremor Checklist  -----------------------  Handedness: Right handed  Speech: No quaver (\"went away when Right IPG was turned on\")  Resting tremor: No  Postural tremor: Proximal and distal LUE tremor, distal > proximal   Kinetic tremor: Head tremor: Yes-yes head tremor-- intermittent, increased when fatigued  Finger-to-nose testing (FTN): Provokes significant action and terminal intention tremors in LUE,   Sequential finger tapping (SFT): Can perform with both UEs though contaminated by a kinetic tremor, L = R   Drinking from a cup: Can drink water from a cup using her right hand, cannot do it with her left hand   Rapid alternating movements: Well performed (mu-cake)  Toe tapping: Well performed with right foot, slow and slightly arrhythmic with the left foot   Station and Gait: Not stooped, " "deliberate, normal stride and pace, evoked tremor in both hands while walking, little if any arm swing bilaterally  Pivot turns: Marked imbalance, afraid of falling      ROS: The patient has completed a [facility] Patient Health History including a 14-system review that can be viewed in EPIC.    Relevant Medications:  --------------------------   Primidone 250 m tab in the morning, 2 tabs in the evening, last taken at ~8:00 pm last night  Gabapentin 600 m tab tid, last taken at ~8:00 pm last night     Allergies: Penicillin, bee stings, and Aspirin    PMH: See my progress note dated 2017.    PSH: See my progress note dated 2017.    FH:  See my progress note dated 2017.    SH: The patient drinks ~two alcoholic beverages/week, and this \"helps her tremor\".     Program 1  -------------  Left Vim battery status:  2.96 volts  Left Vim initial parameter values: c+/2B-, 3.0 mA, 90 usec, 130 Hz  Therapy Impedance: 950 Ohms  Therapy current: 3.00 mA   Allocated current range: from 2.50 to 3.00 mA     Left Vim final parameter values: c+/2B-, 3.25 mA, 90 usec, 130 Hz  Program impedance: 937 Ohms  Therapy current: 3.25 mA  Allocated current range: no range provided    -------------------------------------------------------    Right Vim battery status: >3.00 volts  Right Vim final settings: c+/10ABC-, 1.00 mA, 60 usec, 130 Hz (see below)  Program impedance: 1087 Ohms  Therapy current: 1.00 mA  Allocated current range: no range provided           REPROGRAMMING LEFT IPG     Left Vim initial settings: c+/2B-, 3.00 mA, 90 usec, 130 Hz (baseline above)  Increase current to 3.25 mA, other parameter values unchanged -> increased tremor suppression, able to drink from a cup without spilling contents, no side effects           INITIAL PROGRAMMING RIGHT IPG    Right Vim settings: c+/10ABC-, 0.00 mA, 60 usec, 130 Hz  Increase current to 0.75 mA, other parameter values unchanged -> slight improvement in " "FTN no side effects   Increase current to 1.00 mA, other parameter values unchanged -> transient \"electrical shock\", improvement in FTN and further tremor suppression     Increase current to 1.25 mA, other parameter values unchanged -> \"fingers feel funny\" (upper threshold): pw = 60 usc, program impedance 1087 Ohms    Right Vim settings: c+/11BC-, 1.00 mA, 60 usec, 130 Hz  Increase current to 0.75 mA, other parameter values unchanged -> slight improvement in FTN, no side effects, no tremor suppression  Increase current to 1.00 mA, other parameter values unchanged -> further improvement in FTN , significant tremor suppression, no side efffects  Increase current to 1.25 mA, other parameter values unchanged -> slight if any further improvement in FTN or tremor suppression   Increase current to 0.50 mA, other parameter values unchanged -> \"feels like an electrical wire\" (upper threshold)      Assessment  --------------------  1. The patient has a family history of ET, disabling tremors in both UEs, marginal, if any, response to primidone, imbalance when walking or turning.  2. In program #1 (Right Vim DBS) she has low-current thresholds for sensory side effects.  3. Significant RUE tremor suppression was achieved with both LEFT and RIGHT Vim DBS.   4. The patients complaint of EDS and imbalance may be due to gabapentin and primidone since more than 20% of adult patients taking high-dose gabapentin suffer from somnolence, and a smaller percentage of patients suffer from dizziness and ataxia.    Plan  -------  1. Directional programming may allow for higher current thresholds.  2. The patient will be followed by Dr. Hernandez in [place].   3. Suggest reducing or tapering off gabapentin to relieve EDS.   4. RTC in 9 months for battery check and reprogramming if necessary.    I spent two hours with the patient and her , and the entire time was spent reprogramming the patient's Right IPG, performing a monopolar survey of " electrodes 10 and 11 in the Left IPG, and optimizing the parameter values of both IPGs.  Movement-disorders targets were identified in both UEs prior to programming.    JOANNA Flanagan MSc ([place]), MD  [facility]    cc: Josseline Hernandez MD

## 2018-06-18 ENCOUNTER — TRANSFERRED RECORDS (OUTPATIENT)
Dept: HEALTH INFORMATION MANAGEMENT | Facility: CLINIC | Age: 63
End: 2018-06-18

## 2018-06-25 ENCOUNTER — TELEPHONE (OUTPATIENT)
Dept: NEUROSURGERY | Facility: CLINIC | Age: 63
End: 2018-06-25

## 2018-06-25 NOTE — TELEPHONE ENCOUNTER
"Called Dr. Hernandez's office again to speak to \"KIM Grant\".  will be faxing me the notes from the 6/22 visit. She stated she would have him contact me when he is back from lunch.    Dr. Javier is willing to come in and see her on Wednesday even though it is his research day.    Received the fax of the appointment on 6/18/18 with Dr. Hernandez. Placed in basket for scanning, copy given to Dr. Flanagan. It appears that the only change made was to the RVIM. They changed the pulse width from 60, up to 90 msec. The cathode was changed from 10a, 10b, 10c to the 10c segment only.    Spoke to patient. She will be coming here on Thursday 6/28 to see Dr. Javier at 10:00. She will travel on Wednesday. Advised her to contact Dr. Hernandez when we get off the phone. She said she would.  "

## 2018-06-25 NOTE — TELEPHONE ENCOUNTER
"The patient is S/P left-side DBS electrode placement on 03/14/2017 and generator/battery on 3/24/2017.  He is also S/P right-side DBS electrode placement on 03/23/2018 and generator/battery 04/06/2018.    Received message from patient stating that she had an appointment with Dr. Hernandez in [place] last Monday 6/18. Dr. Hernandez/intern changed a setting. Since that change she reports having constant headaches. Also, she said that Dr. Hernandez did not like the look of her scar.    Called Dr. Hernandez's office, Damon (RN) will call me back shortly.    Patient reports \"bad\" headaches throughout the day after the programming appointment with Dr. Hernandez. Patient reports the headaches started last Monday 6/18, after the intern was \"playing with the battery\", shortly after the appointment. When she turns off the battery it goes away. Her headache is on the right side (middle), she rates the pain a 8-9/10. It is described as a constant, piercing pain. When she shuts off the Deep Brain Stimulation it goes to 0/10.    Tremors are not as well controlled now. Dr. Hernandez changed the settings because she did not like the sound of her speech. Patient reports that the intern made the changes and neither one could connect to [employer].    She has not contacted Dr. Hernandez. Dr. Hernandez gave her a referral to see a wound care specialist for the scan on her head. That appointment is not until Friday 6/29. I told her that that is not soon enough.  I asked her to take a picture of her scar. She says it \"itches really bad\", she has been scratching it somewhat. She rates the itching as a 10/10 constant. She said it started itching about 1 month ago with a severity of about  1-2/10. It has been a 10/10, 2-3 weeks.    She is also now reporting shortness of breath. At her baseline, she used to get SOB walking up steps but it is much worse now and occurs just laying down. This also started 2-3 weeks ago. All she wants to do is sleep and this is getting " worse.     Patient wants to come to the [facility] and has lost some confidence with Dr. Hernandez. I will check with Dr. Flanagan for input moving forward.    Patient agreed to call her primary care provider right after hanging up on the phone. I told her she needs to be seen today. She also agreed to contact Dr. Hernandez's office. I will call the patient back after hearing from Dr. Flanagan.

## 2018-06-25 NOTE — TELEPHONE ENCOUNTER
Spoke with Dr. Javier. He wants the patient to come down today. He would like to look at it personally.    Called patient back. The soonest she can get here would be Wednesday. It is a 4-5 hour drive.   She spoke to her primary care provider's office and the first opening is 7/5.

## 2018-06-26 ENCOUNTER — TRANSFERRED RECORDS (OUTPATIENT)
Dept: HEALTH INFORMATION MANAGEMENT | Facility: CLINIC | Age: 63
End: 2018-06-26

## 2018-06-26 NOTE — TELEPHONE ENCOUNTER
Left voice mail for patient letting her know that Dr. Flanagan will plan on seeing her after her appointment with Dr. Javier on Thursday. He will be coming in specifically to see her, at 11:00.    Spoke to Dr. Flanagan. He explained to me that when contact 10 was changed from ABC to just C, more current is flowing out of that one directional contact than it would be when diffused through all three (ABC). Patient had very low thresholds and this change along with increasing the pulse width from 60 to 90 is probably too much stimulation and may be the cause of the headaches.    Called KIM Grant with Dr. Hernandez. He advised patient to have her breathing checked out in the emergency department. He asked her to call their office to get in for additional programming. I explained to Rudy, what Dr. Flanagan thinks may be going on.

## 2018-06-28 ENCOUNTER — OFFICE VISIT (OUTPATIENT)
Dept: NEUROSURGERY | Facility: CLINIC | Age: 63
End: 2018-06-28
Payer: COMMERCIAL

## 2018-06-28 ENCOUNTER — OFFICE VISIT (OUTPATIENT)
Dept: NEUROLOGY | Facility: CLINIC | Age: 63
End: 2018-06-28
Payer: COMMERCIAL

## 2018-06-28 VITALS
WEIGHT: 194.9 LBS | DIASTOLIC BLOOD PRESSURE: 86 MMHG | OXYGEN SATURATION: 96 % | HEART RATE: 84 BPM | TEMPERATURE: 98.2 F | SYSTOLIC BLOOD PRESSURE: 126 MMHG | HEIGHT: 66 IN | RESPIRATION RATE: 24 BRPM | BODY MASS INDEX: 31.32 KG/M2

## 2018-06-28 VITALS
TEMPERATURE: 98.2 F | RESPIRATION RATE: 24 BRPM | HEIGHT: 66 IN | OXYGEN SATURATION: 96 % | SYSTOLIC BLOOD PRESSURE: 126 MMHG | DIASTOLIC BLOOD PRESSURE: 86 MMHG | HEART RATE: 84 BPM | WEIGHT: 194.9 LBS | BODY MASS INDEX: 31.32 KG/M2

## 2018-06-28 DIAGNOSIS — Z96.89 S/P DEEP BRAIN STIMULATOR PLACEMENT: ICD-10-CM

## 2018-06-28 DIAGNOSIS — G25.0 HEREDITARY ESSENTIAL TREMOR: ICD-10-CM

## 2018-06-28 DIAGNOSIS — G25.0 ESSENTIAL TREMOR: Primary | ICD-10-CM

## 2018-06-28 PROBLEM — M54.6 CHRONIC THORACIC BACK PAIN: Status: ACTIVE | Noted: 2018-06-28

## 2018-06-28 PROBLEM — G25.81 RESTLESS LEG SYNDROME: Status: ACTIVE | Noted: 2017-07-18

## 2018-06-28 PROBLEM — Z98.84 S/P GASTRIC BYPASS: Status: ACTIVE | Noted: 2017-05-18

## 2018-06-28 PROBLEM — E55.9 VITAMIN D DEFICIENCY: Status: ACTIVE | Noted: 2017-05-22

## 2018-06-28 PROBLEM — G89.29 CHRONIC THORACIC BACK PAIN: Status: ACTIVE | Noted: 2018-06-28

## 2018-06-28 PROBLEM — G56.01 CARPAL TUNNEL SYNDROME, RIGHT: Status: ACTIVE | Noted: 2018-05-29

## 2018-06-28 PROBLEM — M18.11 PRIMARY OSTEOARTHRITIS OF FIRST CARPOMETACARPAL JOINT OF RIGHT HAND: Status: ACTIVE | Noted: 2018-05-29

## 2018-06-28 RX ORDER — SERTRALINE HYDROCHLORIDE 100 MG/1
0.5 TABLET, FILM COATED ORAL DAILY
COMMUNITY
Start: 2018-06-18

## 2018-06-28 RX ORDER — GABAPENTIN 800 MG/1
0.2 TABLET ORAL 2 TIMES DAILY
Refills: 1 | COMMUNITY
Start: 2017-06-30 | End: 2018-11-16 | Stop reason: ALTCHOICE

## 2018-06-28 RX ORDER — ATORVASTATIN CALCIUM 10 MG/1
10 TABLET, FILM COATED ORAL DAILY
COMMUNITY
Start: 2018-05-24 | End: 2022-11-07

## 2018-06-28 ASSESSMENT — PAIN SCALES - GENERAL
PAINLEVEL: MODERATE PAIN (4)
PAINLEVEL: MODERATE PAIN (4)

## 2018-06-28 NOTE — PROGRESS NOTES
HISTORY AND PHYSICAL EXAM    Chief Complaint   Patient presents with     RECHECK     Essential tremor s/p bilateral Vim DBS Deep Brain Stimulation; Wound check for the right side surgery.       HISTORY OF PRESENT ILLNESS  We saw Ms. Hilary Cornelius back in Neurosurgery Clinic today for a post-operative visit.  She is a 63 year old women who suffers from essential tremor affecting her bilateral hands.  She underwent left-sided Vim DBS in 03/2017 and more recently a right-sided Vim DBS on 03/23/2018.  She has [employer] Infinity electrodes and generator/batteries over the bilateral chest walls.  She has been doing well after her first side surgery and her tremor was well controlled.  This led to her second side surgery in 3/2018.  She did not follow up with me or our clinic but rather followed up with her PCP for her wound check.  She was seeing Dr. Hernandez recently for programming and concern was raised regarding her incision, more specifically her right cranial incision.  Subsequently, a photo was sent to me which demonstrated that she still had the Dermabond.  Dr. Hernandez was concerned about a wound infection and has referred her to a wound clinic.  Also, she was reporting headaches, dizziness and imbalance following her most recent surgery and initial programming, but these are all improving.  She currently feels well.  For visit with the neurology team her at [facility] and for a wound check, we have asked the patient to make the trip down to [facility].  Patient reports no drainage or redness or pain at the incision site.  She does report that it is itchy.    Past Medical History:   Diagnosis Date     Chronic pain 11/8/2016     Cognitive disorder 12/4/2016    2016 evaluation   IMPRESSIONS AND RECOMMENDATIONS  Current results indicate overall intellectual functioning estimated fall in the mildly impaired range, marginally below premorbid estimates of low average based on single word reading abilities. She demonstrates a relative  inefficiency in learning of new material, but retains information quite well once she has learned it. Visual-spatial abilities     Exercise-induced asthma      Gastroesophageal reflux disease 3/30/2015    XR ESOPHAGRAM7/8/2016  [facility]  Result Narrative  This document is currently in Final Status  Exam Accession# [id number]  XR ESOPHAGRAM  CLINICAL INFORMATION: Dysphagia;   COMPARISON: None.  FINDINGS: The esophagus is normal in course and caliber in this post gastric bypass patient. No intrinsic or extrinsic mass lesions are identified. No strictures. There is a small hiatal hernia. Gastroesophageal reflux is observed during this examination  with numerous tertiary contractions and delayed esophageal clearance.  IMPRESSION:  1. Gastroesophageal reflux with numerous tertiary contractions and delayed esophageal clearance. 2. Sliding hiatal hernia in this post gastric bypass patient.  Dictated By: Dandre Wyatt MD 7/8/2016 9:47 AM Edited By: MIKE 7/8/2016 10:02 AM  Electronically Signed: Dandre Wyatt MD 7/8/2016 4:24 PM   Status Results Details         H/O bariatric surgery 11/08/2016     Headache disorder 11/05/2016     Heart murmur 11/8/2016     Rheumatic fever      T2DM (type 2 diabetes mellitus) (H)     Under control s/p gastric bypass     Tremor 11/5/2016       Past Surgical History:   Procedure Laterality Date     APPENDECTOMY       CHOLECYSTECTOMY       COLONOSCOPY       ENT SURGERY       GASTRIC BYPASS  2005     IMPLANT DEEP BRAIN STIMULATION GENERATOR / BATTERY Left 3/24/2017    Procedure: IMPLANT DEEP BRAIN STIMULATION GENERATOR / BATTERY;  Surgeon: Henok Javier MD;  Location:  OR     IMPLANT DEEP BRAIN STIMULATION GENERATOR / BATTERY Right 4/6/2018    Procedure: IMPLANT DEEP BRAIN STIMULATION GENERATOR / BATTERY;  Deep Brain Stimulator Placement, Phase II, Placement Of Deep Brain Stimulator Generator/Battery Over The Right Chest Wall;  Surgeon: Henok Javier MD;  Location:  OR      OPTICAL TRACKING SYSTEM INSERTION DEEP BRAIN STIMULATION Left 3/14/2017    Procedure: OPTICAL TRACKING SYSTEM INSERTION DEEP BRAIN STIMULATION;  Surgeon: Henok Javier MD;  Location: [facility] OR     OPTICAL TRACKING SYSTEM INSERTION DEEP BRAIN STIMULATION Right 3/23/2018    Procedure: OPTICAL TRACKING SYSTEM INSERTION DEEP BRAIN STIMULATION;  Stealth Assisted Right Deep Brain Stimulator Placement, Phase I, Placement Right Side Deep Brain Stimulator Electrode, Target Right Ventral Intermediate Nucleus Of The Thalamus With Microelectrode Recording;  Surgeon: Henok Javier MD;  Location: [facility] OR     ORTHOPEDIC SURGERY       thoracic spine surgery       TUBAL LIGATION         Family History   Problem Relation Age of Onset     Tremor Mother      Cardiomyopathy Mother      had surgery at [place]     HEART [facility] Mother      Dementia Father      Alzheimer's disease     Parkinsonism Other      paternal aunt's son - cousin     Tremor Daughter      Cancer Brother      Lung       Social History     Social History     Marital status:      Spouse name: N/A     Number of children: N/A     Years of education: N/A     Occupational History     Not on file.     Social History Main Topics     Smoking status: Never Smoker     Smokeless tobacco: Never Used     Alcohol use 0.0 oz/week      Comment: social     Drug use: No     Sexual activity: Yes     Partners: Male     Other Topics Concern     Parent/Sibling W/ Cabg, Mi Or Angioplasty Before 65f 55m? No     Social History Narrative    from [facility].  to Adilson Cornelius.        [facility] HISTORY: The patient was born in [place], [place]. The patient was educated formally through 12 years. The patient is presently disabled due to her spinal difficulties. The patient has been  40 years. The patient has 3 children of that marriage. The patient does not use tobacco. The patient has social use of alcohol. The patient has no history of drug or  intravenous drug use or abuse.         FAMILY HISTORY: A strong family history of tremor with mother having tremor, but also a strong paternal family history of tremor with father with multiple cousins. Patient does have one cousin with Parkinson disease. The patients father suffered with dementia of the Alzheimer type.     2 Daughters and son    Daughter lives 20 miles away in [place] from her her    Daughter lives in [place]    Son lives in [place]        Staying at the [facility]          Allergies   Allergen Reactions     Asa [Aspirin] Unknown and Other (See Comments)     Heavy bleeding     Bee Venom Swelling     Penicillins Rash     rash  rash       Current Outpatient Prescriptions   Medication     acetaminophen (TYLENOL) 325 MG tablet     albuterol (PROAIR HFA, PROVENTIL HFA, VENTOLIN HFA) 108 (90 BASE) MCG/ACT inhaler     atorvastatin (LIPITOR) 10 MG tablet     calcium carbonate (OS-GISELA 600 MG [place]. CA) 600 MG tablet     cyabnocobalamin (VITAMIN B-12) 2500 MCG sublingual tablet     diphenhydrAMINE-acetaminophen (TYLENOL PM EXTRA STRENGTH)  MG tablet     ferrous fumarate 65 mg, [place]. FE,-Vitamin C 125 mg (VITRON C)  MG TABS tablet     folic acid (FOLVITE) 400 MCG tablet     gabapentin (NEURONTIN) 800 MG tablet     Homeopathic Products (ZICAM COLD REMEDY) TBDP     Lactobacillus (PROBIOTIC ACIDOPHILUS PO)     Multiple vitamin TABS     primidone (MYSOLINE) 250 MG tablet     sertraline (ZOLOFT) 100 MG tablet     [DISCONTINUED] gabapentin (NEURONTIN) 600 MG tablet     No current facility-administered medications for this visit.        REVIEW OF SYSTEMS - also per HPI.  General: POSITIVE for headaches. Negative for difficulty sleeping, chills/sweats/fever, fatigue, weight gain or loss.  Skin: negative for color changes of the hands or feet in the cold, chronic skin itching, poor scarring/non-healing ulcer, skin rashes or hives, or unusual moles.   Eyes: negative for blurred  "vision, crossed eyes, double vision, eye infection or vision flashes or halos  Ears/Nose/Throat: negative for bleeding gums, difficulty swallowing, negative for earache, ear discharge or hearing loss.  Respiratory: POSITIVE for shortness of breath with exertion. Negative for asthma, chronic cough, coughing up blood, night sweats tuberculosis, wheezing.  Cardiovascular: POSITIVE for heart murmurs. Negative for lower extremity swelling, chest pain, difficulty breathing at night, irregular heart beat, pacemaker, varicose vein.  Gastrointestinal: POSITIVE for heartburn. Negative for black stools, blood in stool, chronic diarrhea, Hepatitis A, B, C, constipation, liver disease, nausea, vomiting.  Genitourinary: negative for difficulty with urination, discharges, urgency, urinary tract infection.  Musculoskeletal: POSITIVE for arthritis in knees. Negative for joint swelling, muscle tenderness, osteoporosis.  Neurologic: POSITIVE for headaches and tremors. Negative numbness of arms or legs, problem with memory, tingling of hands, arms or legs.  Psychiatric: negative for anxiety, depression, panic attacks, restlessness  Hematologic/Lymphatic/Immunologic: POSITIVE for easy skin bruising but negative workup. Negative for marked fatigue, prolonged bleeding from cuts or tooth extractions, tender glands/lymph nodes.  Endocrine: negative for excessive hunger/thirst, intolerance to warm room, loss of libido, multiple broken bones, rapid weight gain/loss, thyroid problems, spontaneous nipple discharge.  Allergic: negative for hay fever or asthma.      PHYSICAL EXAM  /86  Pulse 84  Temp 98.2  F (36.8  C) (Oral)  Resp 24  Ht 1.676 m (5' 6\")  SpO2 96%  Breastfeeding? No    General: Awake, alert, oriented. Well nourished, well developed, she is not in any acute distress.  Incisions: Right frontal incision is healing well.  There was still Dermabond at the surgical site.  Dermabond was removed without difficulty.  All " sutures are dissolved.  Right parietal and right chest wall incisions also healing well.  Left frontal, left parietal, and left chest wall incisions are well-healed. There are no areas of redness, fluid collection or exposed hardware.     Neurological:  Awake, alert and oriented to date, time, place and person. Speech fluent.   Pupils equal, round, reactive to light.  Extraocular movement intact.  Facial sensation intact.  Face symmetric.    Motor: moves all extremities well.  Sensation: grossly intact.      ASSESSMENT  63 year old female with a history of essential tremor.  S/p left VIM DBS 03/2017  S/p right VIM DBS 03/2018  Infinity generator/batteries over the bilateral chest walls.  Well healing wounds.    Patient went to see Dr. Hernandez and she raised a concern about possible wound infection.  The initial photos that we received were consistent with Dermabond still being on the surgical site.  Dermabond was removed without any difficulty.  The right frontal incision is healing very well with no signs of infection.  There are no areas of redness, wound breakdown, fluid collection, tenderness or drainage.      PLAN  1.  No future appointments needed at this time from neurosurgery perspective- we will see her back as needed.    I, Lucian Ruelas, am serving as a scribe to document services personally performed by Henok Javier MD, PhD, based upon my observations and the provider's statements to me. All documentation has been reviewed and edited by the aforementioned doctor prior to being entered into the official medical record.    I, Henok Javier, attest that above named individual is acting in scribe capacity, has observed my performance of the services and has documented them in accordance with my direction. The documentation recorded by the scribe accurately reflects the service I personally performed and the decisions made by me. The document was also partially recorded by me and the entire document was  edited by me as well.

## 2018-06-28 NOTE — LETTER
"2018       RE: Hilary Cornelius  [address]  [facility] [address]     Dear Colleague,    Thank you for referring your patient, Hilary Cornelius, to the [facility]UROLOG[facility] at [facility]. Please see a copy of my visit note below.    PCP: Zachary Holguin  Referring Provider: Dr. Jus Ybarra  Patient: Hilary Cornelius  : 1955  DEO: 2018    Vital Signs: /86, P 84, Weight 194 lb    Chief Complaint: tremors    The patient has familial essential tremor and is (i)  S/P left-side DBS electrode placement on 2017 and generator/battery on 3/24/2017 and (ii) S/P right-side DBS electrode placement on 2018 and generator/battery 2018.  She is returning to clinic for troubleshooting and reprogramming her left-side IPG.       Additional information relevant to this clinic visit is provided in the following notes:  ---------------------  1. Dr. Josseline Hernandez's [facility] notes dated 2018 (to be scanned into EPIC).   2. Ivana Martin's telephone encounters with the patient dated 2018 (available in EPIC).      The patient states that Dr. Hernandez thought that her speech was altered (slurred), and it was decided to reprogram her IPGs.  At home one hour after an intern made changes in her RIGHT DBS parameter values the patient experienced \"pins and needles over the entire scalp\" and a persistent severe (10/10) headache which forced her to lie down in bed,  Also, for unknown reasons, Dr. Hernandez was unable to access the patient's LEFT DBS parameter values, and the patient's LUE tremors worsened.  She suffered from the above until her DBS parameter values were reset to their 2018 values, and she decided to return to the  for reprogramming.        Tremor Checklist  ------------------------  Handedness: Right handed  Speech: No quaver  Resting tremor: None, even with distraction  Postural tremor: Bilateral UEs   Kinetic tremor: Head " "tremor: Yes-yes head tremor when drinking water from a cup  Finger-to-nose esting (FTN): Significant action and terminal intention tremors, L >> R  Sequential finger tapping (SFT): Slow, reasonably accurate, but contaminated with slight kinetic tremor  Drinking from a cup: Able to drink water from a cup but with slight terminal intention tremor as the cup approaches the mouth  Toe tapping: Slow with the right foot, well performed with the left foot and slightly dysrhythmic  Station and Gait: Not stooped, normal stride and pace  Pivot turns: Cannot perform, visible imbalance      Relevant Medications    -----------------------------   Gabapentin 600 m tab bid, last taken at ...  Primidone 250 m tab in the morning and 2 tabs in the evening, last taken yesterday  at 0.00 pm.       Allergies: Penicillin, bee stings, and Aspirin    PMH: See my progress note dated 2017.    PSH: See my progress note dated 2017.    FH:  See my progress note dated 2017.    SH: The patient drinks ~two alcoholic beverages/week, and this \"helps her tremor\".        PROGRAMMING RESULTS    LEFT  VIM  --------------  FINAL parameter values on 2018  Left Vim battery status:  2.96 volts  Left Vim FINAL parameter values: c+/2B-, 3.25 mA, 90 usec, 130 Hz  Program Impedance: 937 Ohms  Therapy current: 3.25 mA   Allocated current range: none provided   ---------------  Dr. Hernandez's final parameter values on 2018  Left Vim battery status:  2.98 volts  Left Vim FINAL parameter values: c+/2B-, 3.00 mA, 90 usec, 130 Hz  Program Impedance: unknown  Therapy current: 3.00 mA   Allocated current range: none provided   ---------------  Parameter values on 2018 ([place] [facility] [place]  [place] Vim battery status:  2.94 volts  Left Vim parameter values: c+/2B-, 3.25 mA, 90 usec, 130 Hz  Program Impedance: 987 Ohms  Therapy current: 3.25 mA   Allocated current range: none provided    FINAL parameter values on " 06/28/2018  Left Vim battery status:  2.94 volts  Left Vim FINAL parameter values: c+/2B-, 3.25 mA, 60 usec, 130 Hz  Program Impedance: 987 Ohms  Therapy current: 3.25 mA   Allocated current range: 2.00 to 3.50     --------------------------------------------------------------------  RIGHT VIM  ----------------  FINAL parameter values on 05/02/2018  Right Vim battery status:  >3.00 volts  Right Vim FINAL parameter values: c+/10ABC-, 1.00 mA, 60 usec, 130 Hz  Program Impedance: uknown  Therapy current: 1.00 mA   Allocated current range: none provided     Dr. Hernandez's FINAL parameter values on 06/18/2018  Right Vim battery status:  2.98 volts  Right Vim INITIAL  parameter values: c+/10C-, 1.00 mA, 90 usec, 130 Hz  Program Impedance: unknown  Therapy current: 1.00 mA  Allocated current range: none provided    Parameter values on 06/26/2018 (Right IPG interrogated at the [facility] [place])  Right Vim battery status:  2.98 volts  Right Vim parameter values: c+/10ABC-, 1.00 mA, 60 usec, 130 Hz  Program Impedance: 887 Ohms  Therapy current: 1.00 mA   Allocated current range: none provided    FINAL parameter values on 06/28/2018  Right Vim battery status:  2.98 volts  Right Vim FINAL parameter values: c+/10ABC-, 1.00 mA, 60 usec, 130 Hz  Program Impedance: 900  Therapy current: 1.00 mA   Allocated current range: 0.50 to 1.10 mA       REPROGAMMING LEFT AND RIGHT IPGs    Left  Vim initial settings: c+/2B-, 0.50 mA, 60 usec, 130 Hz ->   Increase current from 0.50 to 1.00 to 1.50 mA, other parameter values unchanged -> improvement in FTN   Increase current from 1.50 to 2.00 to 2.50 mA, other parameter values unchanged -> further improvement of FTN and drinking water from a cup   Increase current from 2.50 to 3.00 to 3.25 to 3.50 to 3.75 to 4.00 mA other parameter values unchanged -> slightly dysarthric but FTN an drinking from a cup are unchanged  Reduce current frm 4.00 to 3.25 (final values, see  above)    -------------------------------------------------------------------------------------    Right Vim settings: c+/10ABC-, 1.00 mA, 60 usec, 130 Hz  Increase current from to 0.50 to 0.75 mA, other parameter values unchanged -> no side effects, slight improvement in FTN, unable to drink water from a cup   Increase current from 0.75 to 1.00 mA, other parameter values unchanged -> no side effects, further improvement in FTN and drinking water from a cup  Increase current from to 1.00 to 1.25 mA, other parameter values unchanged -> transient tingling in all fingers of the left hand   With current at 1.00 mA and pw increased from 60 to 90 usec, other parameter values unchanged -> marked ? persistent tingling in the left hand (upper threshold)     ------------------------------------------    Assessment  --------------------  1. Strong family history of ET, disabling tremors, marginal response to primidone  2. EDS and significant imbalance likely due to gabapentin   3. Low-current thresholds for sensory side effects (RIGHT Vim DBS).  4. Nearly complete RUE tremor suppression of RUE (LEFT Vim DBS.    Plan  -------  1. Patient's tremor suppression and ability to drink water from a glass without spillage is unchanged since 05/02/2018     2. To be followed in [place].   3. Suggest tapering off gabapentin to relieve EDS.   4. RTC in 6 months for battery check and reprogramming if necessary.    I spent two hours with the patient identifying a DBS target by means of a brief  movement-disorders examination, interrogating the patient's neurostimulators, and programming both of her IPGs.     Segundo Mcdonough ([employer]), MD  [facility]    cc: Josseline Hernandez MD

## 2018-06-28 NOTE — PROGRESS NOTES
"PCP: Zachary Holguin  Referring Provider: Dr. Jus Ybarra  Patient: Hilary Cornelius  : 1955  DEO: 2018    Vital Signs: /86, P 84, Weight 194 lb    Chief Complaint: tremors    The patient has familial essential tremor and is (i)  S/P left-side DBS electrode placement on 2017 and generator/battery on 3/24/2017 and (ii) S/P right-side DBS electrode placement on 2018 and generator/battery 2018.  She is returning to clinic for troubleshooting and reprogramming her left-side IPG.       Additional information relevant to this clinic visit is provided in the following notes:  ---------------------  1. Dr. Josseline Hernandez's [facility] notes dated 2018 (to be scanned into EPIC).   2. Ivana Martin's telephone encounters with the patient dated 2018 (available in EPIC).      The patient states that Dr. Hernandez thought that her speech was altered (slurred), and it was decided to reprogram her IPGs.  At home one hour after an intern made changes in her RIGHT DBS parameter values the patient experienced \"pins and needles over the entire scalp\" and a persistent severe (10/10) headache which forced her to lie down in bed,  Also, for unknown reasons, Dr. Hernandez was unable to access the patient's LEFT DBS parameter values, and the patient's LUE tremors worsened.  She suffered from the above until her DBS parameter values were reset to their 2018 values, and she decided to return to the  for reprogramming.        Tremor Checklist  ------------------------  Handedness: Right handed  Speech: No quaver  Resting tremor: None, even with distraction  Postural tremor: Bilateral UEs   Kinetic tremor: Head tremor: Yes-yes head tremor when drinking water from a cup  Finger-to-nose esting (FTN): Significant action and terminal intention tremors, L >> R  Sequential finger tapping (SFT): Slow, reasonably accurate, but contaminated with slight kinetic tremor  Drinking from a cup: Able to " "drink water from a cup but with slight terminal intention tremor as the cup approaches the mouth  Toe tapping: Slow with the right foot, well performed with the left foot and slightly dysrhythmic  Station and Gait: Not stooped, normal stride and pace  Pivot turns: Cannot perform, visible imbalance      Relevant Medications    -----------------------------   Gabapentin 600 m tab bid, last taken at ...  Primidone 250 m tab in the morning and 2 tabs in the evening, last taken yesterday  at 0.00 pm.       Allergies: Penicillin, bee stings, and Aspirin    PMH: See my progress note dated 2017.    PSH: See my progress note dated 2017.    FH:  See my progress note dated 2017.    SH: The patient drinks ~two alcoholic beverages/week, and this \"helps her tremor\".        PROGRAMMING RESULTS    LEFT  VIM  --------------  FINAL parameter values on 2018  Left Vim battery status:  2.96 volts  Left Vim FINAL parameter values: c+/2B-, 3.25 mA, 90 usec, 130 Hz  Program Impedance: 937 Ohms  Therapy current: 3.25 mA   Allocated current range: none provided   ---------------  Dr. Hernandez's final parameter values on 2018  Left Vim battery status:  2.98 volts  Left Vim FINAL parameter values: c+/2B-, 3.00 mA, 90 usec, 130 Hz  Program Impedance: unknown  Therapy current: 3.00 mA   Allocated current range: none provided   ---------------  Parameter values on 2018 [place] [facility] [place]  [place] Vim battery status:  2.94 volts  Left Vim parameter values: c+/2B-, 3.25 mA, 90 usec, 130 Hz  Program Impedance: 987 Ohms  Therapy current: 3.25 mA   Allocated current range: none provided    FINAL parameter values on 2018  Left Vim battery status:  2.94 volts  Left Vim FINAL parameter values: c+/2B-, 3.25 mA, 60 usec, 130 Hz  Program Impedance: 987 Ohms  Therapy current: 3.25 mA   Allocated current range: 2.00 to 3.50 "     --------------------------------------------------------------------  RIGHT VIM  ----------------  FINAL parameter values on 05/02/2018  Right Vim battery status:  >3.00 volts  Right Vim FINAL parameter values: c+/10ABC-, 1.00 mA, 60 usec, 130 Hz  Program Impedance: uknown  Therapy current: 1.00 mA   Allocated current range: none provided     Dr. Hernandez's FINAL parameter values on 06/18/2018  Right Vim battery status:  2.98 volts  Right Vim INITIAL  parameter values: c+/10C-, 1.00 mA, 90 usec, 130 Hz  Program Impedance: unknown  Therapy current: 1.00 mA  Allocated current range: none provided    Parameter values on 06/26/2018 ([place] [facility] [place]  Right Vim battery status:  2.98 volts  Right Vim parameter values: c+/10ABC-, 1.00 mA, 60 usec, 130 Hz  Program Impedance: 887 Ohms  Therapy current: 1.00 mA   Allocated current range: none provided    FINAL parameter values on 06/28/2018  Right Vim battery status:  2.98 volts  Right Vim FINAL parameter values: c+/10ABC-, 1.00 mA, 60 usec, 130 Hz  Program Impedance: 900  Therapy current: 1.00 mA   Allocated current range: 0.50 to 1.10 mA       REPROGAMMING LEFT AND RIGHT IPGs    Left  Vim initial settings: c+/2B-, 0.50 mA, 60 usec, 130 Hz ->   Increase current from 0.50 to 1.00 to 1.50 mA, other parameter values unchanged -> improvement in FTN   Increase current from 1.50 to 2.00 to 2.50 mA, other parameter values unchanged -> further improvement of FTN and drinking water from a cup   Increase current from 2.50 to 3.00 to 3.25 to 3.50 to 3.75 to 4.00 mA other parameter values unchanged -> slightly dysarthric but FTN an drinking from a cup are unchanged  Reduce current frm 4.00 to 3.25 (final values, see above)    -------------------------------------------------------------------------------------    Right Vim settings: c+/10ABC-, 1.00 mA, 60 usec, 130 Hz  Increase current from to 0.50 to 0.75 mA, other parameter values unchanged -> no side effects,  slight improvement in FTN, unable to drink water from a cup   Increase current from 0.75 to 1.00 mA, other parameter values unchanged -> no side effects, further improvement in FTN and drinking water from a cup  Increase current from to 1.00 to 1.25 mA, other parameter values unchanged -> transient tingling in all fingers of the left hand   With current at 1.00 mA and pw increased from 60 to 90 usec, other parameter values unchanged -> marked ? persistent tingling in the left hand (upper threshold)     ------------------------------------------    Assessment  --------------------  1. Strong family history of ET, disabling tremors, marginal response to primidone  2. EDS and significant imbalance likely due to gabapentin   3. Low-current thresholds for sensory side effects (RIGHT Vim DBS).  4. Nearly complete RUE tremor suppression of RUE (LEFT Vim DBS.    Plan  -------  1. Patient's tremor suppression and ability to drink water from a glass without spillage is unchanged since 05/02/2018     2. To be followed in [place].   3. Suggest tapering off gabapentin to relieve EDS.   4. RTC in 6 months for battery check and reprogramming if necessary.    I spent two hours with the patient identifying a DBS target by means of a brief  movement-disorders examination, interrogating the patient's neurostimulators, and programming both of her IPGs.     Segundo Mcdonough ([employer]), MD  [facility]    cc: Josseline Hernandez MD

## 2018-06-28 NOTE — LETTER
6/28/2018       RE: Hilary Cornelius  [address]  [facility] [address]     Dear Colleague,    Thank you for referring your patient, Hilary Cornelius, to the [facility] NEUROSURGERY at [facility]. Please see a copy of my visit note below.    HISTORY AND PHYSICAL EXAM    Chief Complaint   Patient presents with     RECHECK     Essential tremor s/p bilateral Vim DBS Deep Brain Stimulation; Wound check for the right side surgery.       HISTORY OF PRESENT ILLNESS  We saw Ms. Hilary Cornelius back in Neurosurgery Clinic today for a post-operative visit.  She is a 63 year old women who suffers from essential tremor affecting her bilateral hands.  She underwent left-sided Vim DBS in 03/2017 and more recently a right-sided Vim DBS on 03/23/2018.  She has Abbott Infinity electrodes and generator/batteries over the bilateral chest walls.  She has been doing well after her first side surgery and her tremor was well controlled.  This led to her second side surgery in 3/2018.  She did not follow up with me or our clinic but rather followed up with her PCP for her wound check.  She was seeing Dr. Hernandez recently for programming and concern was raised regarding her incision, more specifically her right cranial incision.  Subsequently, a photo was sent to me which demonstrated that she still had the Dermabond.  Dr. Hernandez was concerned about a wound infection and has referred her to a wound clinic.  Also, she was reporting headaches, dizziness and imbalance following her most recent surgery and initial programming, but these are all improving.  She currently feels well.  For visit with the neurology team her at [facility] and for a wound check, we have asked the patient to make the trip down to [facility].  Patient reports no drainage or redness or pain at the incision site.  She does report that it is itchy.    Past Medical History:   Diagnosis Date     Chronic pain 11/8/2016     Cognitive disorder  12/4/2016 2016 evaluation   IMPRESSIONS AND RECOMMENDATIONS  Current results indicate overall intellectual functioning estimated fall in the mildly impaired range, marginally below premorbid estimates of low average based on single word reading abilities. She demonstrates a relative inefficiency in learning of new material, but retains information quite well once she has learned it. Visual-spatial abilities     Exercise-induced asthma      Gastroesophageal reflux disease 3/30/2015    XR ESOPHAGRAM7/8/2016  [facility]  Result Narrative  This document is currently in Final Status  Exam Accession# [id number]  XR ESOPHAGRAM  CLINICAL INFORMATION: Dysphagia;   COMPARISON: None.  FINDINGS: The esophagus is normal in course and caliber in this post gastric bypass patient. No intrinsic or extrinsic mass lesions are identified. No strictures. There is a small hiatal hernia. Gastroesophageal reflux is observed during this examination  with numerous tertiary contractions and delayed esophageal clearance.  IMPRESSION:  1. Gastroesophageal reflux with numerous tertiary contractions and delayed esophageal clearance. 2. Sliding hiatal hernia in this post gastric bypass patient.  Dictated By: Dandre Wyatt MD 7/8/2016 9:47 AM Edited By: MIKE 7/8/2016 10:02 AM  Electronically Signed: Dandre Wyatt MD 7/8/2016 4:24 PM   Status Results Details         H/O bariatric surgery 11/08/2016     Headache disorder 11/05/2016     Heart murmur 11/8/2016     Rheumatic fever      T2DM (type 2 diabetes mellitus) (H)     Under control s/p gastric bypass     Tremor 11/5/2016       Past Surgical History:   Procedure Laterality Date     APPENDECTOMY       CHOLECYSTECTOMY       COLONOSCOPY       ENT SURGERY       GASTRIC BYPASS  2005     IMPLANT DEEP BRAIN STIMULATION GENERATOR / BATTERY Left 3/24/2017    Procedure: IMPLANT DEEP BRAIN STIMULATION GENERATOR / BATTERY;  Surgeon: Henok Javier MD;  Location: [facility] OR     IMPLANT DEEP BRAIN  STIMULATION GENERATOR / BATTERY Right 4/6/2018    Procedure: IMPLANT DEEP BRAIN STIMULATION GENERATOR / BATTERY;  Deep Brain Stimulator Placement, Phase II, Placement Of Deep Brain Stimulator Generator/Battery Over The Right Chest Wall;  Surgeon: Henok Javier MD;  Location: [facility] OR     OPTICAL TRACKING SYSTEM INSERTION DEEP BRAIN STIMULATION Left 3/14/2017    Procedure: OPTICAL TRACKING SYSTEM INSERTION DEEP BRAIN STIMULATION;  Surgeon: Henok Javier MD;  Location: [facility] OR     OPTICAL TRACKING SYSTEM INSERTION DEEP BRAIN STIMULATION Right 3/23/2018    Procedure: OPTICAL TRACKING SYSTEM INSERTION DEEP BRAIN STIMULATION;  Stealth Assisted Right Deep Brain Stimulator Placement, Phase I, Placement Right Side Deep Brain Stimulator Electrode, Target Right Ventral Intermediate Nucleus Of The Thalamus With Microelectrode Recording;  Surgeon: Henok Javier MD;  Location: [facility] OR     ORTHOPEDIC SURGERY       thoracic spine surgery       TUBAL LIGATION         Family History   Problem Relation Age of Onset     Tremor Mother      Cardiomyopathy Mother      had surgery at [place]     HEART DISEASE Mother      Dementia Father      Alzheimer's disease     Parkinsonism Other      paternal aunt's son - cousin     Tremor Daughter      Cancer Brother      Lung       Social History     Social History     Marital status:      Spouse name: N/A     Number of children: N/A     Years of education: N/A     Occupational History     Not on file.     Social History Main Topics     Smoking status: Never Smoker     Smokeless tobacco: Never Used     Alcohol use 0.0 oz/week      Comment: social     Drug use: No     Sexual activity: Yes     Partners: Male     Other Topics Concern     Parent/Sibling W/ Cabg, Mi Or Angioplasty Before 65f 55m? No     Social History Narrative    from [facility].  to Adilson MONTES HISTORY: The patient was born in [place], [place]. The patient was educated  formally through 12 years. The patient is presently disabled due to her spinal difficulties. The patient has been  40 years. The patient has 3 children of that marriage. The patient does not use tobacco. The patient has social use of alcohol. The patient has no history of drug or intravenous drug use or abuse.         FAMILY HISTORY: A strong family history of tremor with mother having tremor, but also a strong paternal family history of tremor with father with multiple cousins. Patient does have one cousin with Parkinson disease. The patients father suffered with dementia of the Alzheimer type.     2 Daughters and son    Daughter lives 20 miles away in [place] from her her    Daughter lives in [place]    Son lives in [place]        Staying at the [facility]          Allergies   Allergen Reactions     Asa [Aspirin] Unknown and Other (See Comments)     Heavy bleeding     Bee Venom Swelling     Penicillins Rash     rash  rash       Current Outpatient Prescriptions   Medication     acetaminophen (TYLENOL) 325 MG tablet     albuterol (PROAIR HFA, PROVENTIL HFA, VENTOLIN HFA) 108 (90 BASE) MCG/ACT inhaler     atorvastatin (LIPITOR) 10 MG tablet     calcium carbonate (OS-GISELA 600 MG [place]. [place]) 600 MG tablet     cyabnocobalamin (VITAMIN B-12) 2500 MCG sublingual tablet     diphenhydrAMINE-acetaminophen (TYLENOL PM EXTRA STRENGTH)  MG tablet     ferrous fumarate 65 mg, [place]. FE,-Vitamin C 125 mg (VITRON C)  MG TABS tablet     folic acid (FOLVITE) 400 MCG tablet     gabapentin (NEURONTIN) 800 MG tablet     Homeopathic Products (ZICAM COLD REMEDY) TBDP     Lactobacillus (PROBIOTIC ACIDOPHILUS PO)     Multiple vitamin TABS     primidone (MYSOLINE) 250 MG tablet     sertraline (ZOLOFT) 100 MG tablet     [DISCONTINUED] gabapentin (NEURONTIN) 600 MG tablet     No current facility-administered medications for this visit.        REVIEW OF SYSTEMS - also per HPI.  General: POSITIVE for  "headaches. Negative for difficulty sleeping, chills/sweats/fever, fatigue, weight gain or loss.  Skin: negative for color changes of the hands or feet in the cold, chronic skin itching, poor scarring/non-healing ulcer, skin rashes or hives, or unusual moles.   Eyes: negative for blurred vision, crossed eyes, double vision, eye infection or vision flashes or halos  Ears/Nose/Throat: negative for bleeding gums, difficulty swallowing, negative for earache, ear discharge or hearing loss.  Respiratory: POSITIVE for shortness of breath with exertion. Negative for asthma, chronic cough, coughing up blood, night sweats tuberculosis, wheezing.  Cardiovascular: POSITIVE for heart murmurs. Negative for lower extremity swelling, chest pain, difficulty breathing at night, irregular heart beat, pacemaker, varicose vein.  Gastrointestinal: POSITIVE for heartburn. Negative for black stools, blood in stool, chronic diarrhea, Hepatitis A, B, C, constipation, liver disease, nausea, vomiting.  Genitourinary: negative for difficulty with urination, discharges, urgency, urinary tract infection.  Musculoskeletal: POSITIVE for arthritis in knees. Negative for joint swelling, muscle tenderness, osteoporosis.  Neurologic: POSITIVE for headaches and tremors. Negative numbness of arms or legs, problem with memory, tingling of hands, arms or legs.  Psychiatric: negative for anxiety, depression, panic attacks, restlessness  Hematologic/Lymphatic/Immunologic: POSITIVE for easy skin bruising but negative workup. Negative for marked fatigue, prolonged bleeding from cuts or tooth extractions, tender glands/lymph nodes.  Endocrine: negative for excessive hunger/thirst, intolerance to warm room, loss of libido, multiple broken bones, rapid weight gain/loss, thyroid problems, spontaneous nipple discharge.  Allergic: negative for hay fever or asthma.      PHYSICAL EXAM  /86  Pulse 84  Temp 98.2  F (36.8  C) (Oral)  Resp 24  Ht 1.676 m (5' 6\") "  SpO2 96%  Breastfeeding? No    General: Awake, alert, oriented. Well nourished, well developed, she is not in any acute distress.  Incisions: Right frontal incision is healing well.  There was still Dermabond at the surgical site.  Dermabond was removed without difficulty.  All sutures are dissolved.  Right parietal and right chest wall incisions also healing well.  Left frontal, left parietal, and left chest wall incisions are well-healed. There are no areas of redness, fluid collection or exposed hardware.     Neurological:  Awake, alert and oriented to date, time, place and person. Speech fluent.   Pupils equal, round, reactive to light.  Extraocular movement intact.  Facial sensation intact.  Face symmetric.    Motor: moves all extremities well.  Sensation: grossly intact.      ASSESSMENT  63 year old female with a history of essential tremor.  S/p left VIM DBS 03/2017  S/p right VIM DBS 03/2018  Infinity generator/batteries over the bilateral chest walls.  Well healing wounds.    Patient went to see Dr. Hernandez and she raised a concern about possible wound infection.  The initial photos that we received were consistent with Dermabond still being on the surgical site.  Dermabond was removed without any difficulty.  The right frontal incision is healing very well with no signs of infection.  There are no areas of redness, wound breakdown, fluid collection, tenderness or drainage.      PLAN  1.  No future appointments needed at this time from neurosurgery perspective- we will see her back as needed.    ILucian, am serving as a scribe to document services personally performed by Henok Javier MD, PhD, based upon my observations and the provider's statements to me. All documentation has been reviewed and edited by the aforementioned doctor prior to being entered into the official medical record.    I, Henok Javier, attest that above named individual is acting in scribe capacity, has observed my  performance of the services and has documented them in accordance with my direction. The documentation recorded by the scribe accurately reflects the service I personally performed and the decisions made by me. The document was also partially recorded by me and the entire document was edited by me as well.         Again, thank you for allowing me to participate in the care of your patient.      Sincerely,    Henok Javier MD

## 2018-06-28 NOTE — MR AVS SNAPSHOT
After Visit Summary   6/28/2018    Hilary Cornelius    MRN: [id number]           Patient Information     Date Of Birth          1955        Visit Information        Provider Department      6/28/2018 10:00 AM Henok Javier MD [facility] Neurosurgery        Today's Diagnoses     Essential tremor    -  1    S/P deep brain stimulator placement           Follow-ups after your visit        Your next 10 appointments already scheduled     Nov 07, 2018  1:30 PM CST   (Arrive by 1:15 PM)   Return Movement Disorder with Randell Flanagan MD   [facility] Neurology ([facility])    [address] [place] [address] [phone]              Who to contact     Please call your clinic at [phone] to:    Ask questions about your health    Make or cancel appointments    Discuss your medicines    Learn about your test results    Speak to your doctor            Additional Information About Your Visit        MyChart Information     [employer] gives you secure access to your electronic health record. If you see a primary care provider, you can also send messages to your care team and make appointments. If you have questions, please call your primary care clinic.  If you do not have a primary care provider, please call [phone] and they will assist you.      Qu[employer]ura is an electronic gateway that provides easy, online access to your medical records. With [employer], you can request a clinic appointment, read your test results, renew a prescription or communicate with your care team.     To access your existing account, please contact your [facility] or call [phone] for assistance.        Care EveryWhere ID     This is your Care EveryWhere ID. This could be used by other organizations to access your [facility] medical records  [id number]        Your Vitals Were     Pulse Temperature Respirations Height Pulse Oximetry  "Breastfeeding?    84 98.2  F (36.8  C) (Oral) 24 1.676 m (5' 6\") 96% No    BMI (Body Mass Index)                   31.46 kg/m2            Blood Pressure from Last 3 Encounters:   06/28/18 126/86   06/28/18 126/86   05/02/18 132/63    Weight from Last 3 Encounters:   06/28/18 88.4 kg (194 lb 14.4 oz)   06/28/18 88.4 kg (194 lb 14.4 oz)   05/02/18 89.4 kg (197 lb)              Today, you had the following     No orders found for display       Primary Care Provider Office Phone # Fax #    Zachary Holguin [phone]       [facility] [address]  [facility] [address]        Equal Access to Services     JANIS TABARES : Jimi cotter Sodeangelo, waaxda luqadaha, qaybta kaalmada arin, ze brown . So [facility] [phone].    ATENCIÓN: Si habla español, tiene a paz disposición servicios gratuitos de asistencia lingüística. Abel al [phone].    We comply with applicable federal civil rights laws and [place] laws. We do not discriminate on the basis of race, color, national origin, age, disability, sex, sexual orientation, or gender identity.            Thank you!     Thank you for choosing [facility]  for your care. Our goal is always to provide you with excellent care. Hearing back from our patients is one way we can continue to improve our services. Please take a few minutes to complete the written survey that you may receive in the mail after your visit with us. Thank you!             Your Updated Medication List - Protect others around you: Learn how to safely use, store and throw away your medicines at www.disposemymeds.org.          This list is accurate as of 6/28/18 11:59 PM.  Always use your most recent med list.                   Brand Name Dispense Instructions for use Diagnosis    acetaminophen 325 MG tablet    TYLENOL    100 tablet    Take 2 tablets (650 mg) by mouth every 4 hours as needed for other (surgical pain)    Essential " tremor       albuterol 108 (90 Base) MCG/ACT Inhaler    PROAIR HFA/PROVENTIL HFA/VENTOLIN HFA     Inhale 2 puffs into the lungs every 6 hours        atorvastatin 10 MG tablet    LIPITOR     Take 10 mg by mouth daily        calcium carbonate 600 MG tablet    OS-GISELA 600 mg [place]. Ca     Take 600 mg by mouth daily        cyanocobalamin 2500 MCG sublingual tablet    VITAMIN B-12     Place 2,500 mcg under the tongue daily        ferrous fumarate 65 mg ([place]. FE)-Vitamin C 125 mg  MG Tabs tablet    VITRON C     Take 1 tablet by mouth daily    Essential tremor       folic acid 400 MCG tablet    FOLVITE     Take 400 mcg by mouth daily        gabapentin 800 MG tablet    NEURONTIN     Take 0.2 tablets by mouth 2 times daily        Multiple vitamin Tabs      Take 1 tablet by mouth daily        primidone 250 MG tablet    MYSOLINE     1 in am and 2 in the evening        PROBIOTIC ACIDOPHILUS PO      Take 1 capsule by mouth daily        sertraline 100 MG tablet    ZOLOFT     Take 0.5 tablets by mouth daily        TYLENOL PM EXTRA STRENGTH  MG tablet   Generic drug:  diphenhydrAMINE-acetaminophen      Take 2 tablets by mouth nightly as needed for sleep    Essential tremor       ZICAM COLD REMEDY Tbdp      Take 1 tablet by mouth 2 times daily    Essential tremor

## 2018-06-28 NOTE — NURSING NOTE
Chief Complaint   Patient presents with     RECHECK     UMP- DBS, 3 MONTHS POST-OP F/U     Dereje Santillan, CMA

## 2018-06-28 NOTE — MR AVS SNAPSHOT
After Visit Summary   6/28/2018    Hilary Cornelius    MRN: [id number]           Patient Information     Date Of Birth          1955        Visit Information        Provider Department      6/28/2018 11:00 AM Randell Flanagan MD [facility] Neurology        Today's Diagnoses     Hereditary essential tremor           Follow-ups after your visit        Follow-up notes from your care team     Return in about 6 months (around 12/28/2018), or if symptoms worsen or fail to improve, for recheck.      Your next 10 appointments already scheduled     Nov 07, 2018  1:30 PM CST   (Arrive by 1:15 PM)   Return Movement Disorder with Randell Flanagan MD   [facility] Neurology ([facility] [place])    [address] [address] [phone]              Who to contact     Please call your clinic at [phone] to:    Ask questions about your health    Make or cancel appointments    Discuss your medicines    Learn about your test results    Speak to your doctor            Additional Information About Your Visit        My[employer]Sense Information     [employer] gives you secure access to your electronic health record. If you see a primary care provider, you can also send messages to your care team and make appointments. If you have questions, please call your primary care clinic.  If you do not have a primary care provider, please call [phone] and they will assist you.      [employer] is an electronic gateway that provides easy, online access to your medical records. With [employer], you can request a clinic appointment, read your test results, renew a prescription or communicate with your care team.     To access your existing account, please contact your [facility] or call [phone] for assistance.        Care EveryWhere ID     This is your Care EveryWhere ID. This could be used by other organizations to access your [place]  "medical records  [id number]        Your Vitals Were     Pulse Temperature Respirations Height Pulse Oximetry Breastfeeding?    84 98.2  F (36.8  C) (Oral) 24 1.676 m (5' 6\") 96% No    BMI (Body Mass Index)                   31.46 kg/m2            Blood Pressure from Last 3 Encounters:   06/28/18 126/86   06/28/18 126/86   05/02/18 132/63    Weight from Last 3 Encounters:   06/28/18 88.4 kg (194 lb 14.4 oz)   06/28/18 88.4 kg (194 lb 14.4 oz)   05/02/18 89.4 kg (197 lb)              We Performed the Following     ANALYSIS NEUROSTIM, COMPLEX DBS W PROGRAM, 1ST HR     ANALYSIS NEUROSTIM, COMPLEX DBS W PROGRAM, EA ADDTL 30 MIN     ANALYSIS NEUROSTIM, COMPLEX DBS W PROGRAM, EA ADDTL 30 MIN        Primary Care Provider Office Phone # Fax #    Zachary Holguin [phone]       [facility] [address] [facility] [address]        Equal Access to Services     [facility]: Hadii aad ku hadasho Soomaali, waaxda luqadaha, qaybta kaalmada adeegyada, waxlauren edwards hayolayinka brown . So [facility] [phone].    ATENCIÓN: Si habla español, tiene a paz disposición servicios gratuitos de asistencia lingüística. Llame al [phone].    We comply with applicable federal civil rights laws and [place] laws. We do not discriminate on the basis of race, color, national origin, age, disability, sex, sexual orientation, or gender identity.            Thank you!     Thank you for choosing [facility] NEUROLOGY  for your care. Our goal is always to provide you with excellent care. Hearing back from our patients is one way we can continue to improve our services. Please take a few minutes to complete the written survey that you may receive in the mail after your visit with us. Thank you!             Your Updated Medication List - Protect others around you: Learn how to safely use, store and throw away your medicines at www.disposemymeds.org.          This list is accurate as of 6/28/18  1:27 PM.  Always use " your most recent med list.                   Brand Name Dispense Instructions for use Diagnosis    acetaminophen 325 MG tablet    TYLENOL    100 tablet    Take 2 tablets (650 mg) by mouth every 4 hours as needed for other (surgical pain)    Essential tremor       albuterol 108 (90 Base) MCG/ACT Inhaler    PROAIR HFA/PROVENTIL HFA/VENTOLIN HFA     Inhale 2 puffs into the lungs every 6 hours        atorvastatin 10 MG tablet    LIPITOR     Take 10 mg by mouth daily        calcium carbonate 600 MG tablet    OS-GISELA 600 mg [place]. Ca     Take 600 mg by mouth daily        cyanocobalamin 2500 MCG sublingual tablet    VITAMIN B-12     Place 2,500 mcg under the tongue daily        ferrous fumarate 65 mg ([place]. FE)-Vitamin C 125 mg  MG Tabs tablet    VITRON C     Take 1 tablet by mouth daily    Essential tremor       folic acid 400 MCG tablet    FOLVITE     Take 400 mcg by mouth daily        gabapentin 800 MG tablet    NEURONTIN     Take 0.2 tablets by mouth 2 times daily        Multiple vitamin Tabs      Take 1 tablet by mouth daily        primidone 250 MG tablet    MYSOLINE     1 in am and 2 in the evening        PROBIOTIC ACIDOPHILUS PO      Take 1 capsule by mouth daily        sertraline 100 MG tablet    ZOLOFT     Take 0.5 tablets by mouth daily        TYLENOL PM EXTRA STRENGTH  MG tablet   Generic drug:  diphenhydrAMINE-acetaminophen      Take 2 tablets by mouth nightly as needed for sleep    Essential tremor       ZICAM COLD REMEDY Tbdp      Take 1 tablet by mouth 2 times daily    Essential tremor

## 2018-11-01 ENCOUNTER — TELEPHONE (OUTPATIENT)
Dept: NEUROLOGY | Facility: CLINIC | Age: 63
End: 2018-11-01

## 2018-11-01 NOTE — TELEPHONE ENCOUNTER
Received this email back from the [employer] rep:    Regarding a stress test, with a 12-lead EKG, the Cardiology team is going to have to put leads in fairly close proximity to the DBS IPG implanted device. When pulling an EKG reading, they will probably see the constant underlying electrical activity associated with the DBS IPG. If the patient can tolerate it, it would be recommended to turn the DBS IPG off for the length of the stress test in order to help the team administer the 12-lead EKG appropriately and allow the test to provide the best information possible to the Cardiologist.     If the patient can t tolerate DBS to be off, the Cardiology team will just have to discern the underlying DBS electrical activity from the cardiac electrical activity.    The same potential underlying DBS electrical signature holds true for any Holter Monitoring. Strongly recommend that they patient keep DBS therapy on for any lengthy monitoring period - the Cardiology team will just have to understand what s going on and look at tad more closely to discriminate between our DBS IPG signal and any abnormal cardiac arrhythmias.

## 2018-11-01 NOTE — TELEPHONE ENCOUNTER
Received this email from patient this morning:    The cardiologist wants me to have a stress test done but they don t know here if I can with the DBS, I need the nuclear medicine one laying down not on the treadmill!!    Called her back and let her know that I cannot communicate with her via email.     Patient's cardiologist wants her to have a stress test using the nuclear medicine injection and they don't know if that is OK with the [employer]. He also wants her to wear a holter monitor over the weekend.    With her permission, I sent an email to the [employer] rep to send her a new card, she states she does not have her 's card to be able to call tech services. I will [employer] message her with any further instructions.

## 2018-11-01 NOTE — TELEPHONE ENCOUNTER
Called patient back and gave her the information from the Rep. She repeated back the instructions and had no further questions or concerns.

## 2018-11-04 ASSESSMENT — ENCOUNTER SYMPTOMS
EYE IRRITATION: 1
FATIGUE: 1
WEAKNESS: 0
MUSCLE CRAMPS: 0
POLYDIPSIA: 1
INCREASED ENERGY: 1
VOMITING: 0
TINGLING: 1
MEMORY LOSS: 1
INSOMNIA: 1
BREAST MASS: 0
MUSCLE WEAKNESS: 1
DISTURBANCES IN COORDINATION: 1
POLYPHAGIA: 0
EYE WATERING: 1
NECK PAIN: 0
DYSPNEA ON EXERTION: 1
POSTURAL DYSPNEA: 0
NUMBNESS: 0
SEIZURES: 0
DECREASED CONCENTRATION: 1
RECTAL PAIN: 0
HEMOPTYSIS: 0
SPUTUM PRODUCTION: 0
WHEEZING: 0
LOSS OF CONSCIOUSNESS: 0
HALLUCINATIONS: 0
DOUBLE VISION: 0
ARTHRALGIAS: 1
NAUSEA: 0
COUGH DISTURBING SLEEP: 0
HEADACHES: 0
DIARRHEA: 0
WEIGHT GAIN: 0
SHORTNESS OF BREATH: 1
DEPRESSION: 1
PANIC: 0
EYE PAIN: 1
DIZZINESS: 1
SWOLLEN GLANDS: 0
SPEECH CHANGE: 0
HEARTBURN: 1
STIFFNESS: 1
TREMORS: 1
NIGHT SWEATS: 0
ALTERED TEMPERATURE REGULATION: 0
PARALYSIS: 0
JOINT SWELLING: 0
CHILLS: 0
BLOOD IN STOOL: 0
BREAST PAIN: 1
MYALGIAS: 1
CONSTIPATION: 1
ABDOMINAL PAIN: 0
BLOATING: 0
EYE REDNESS: 0
DECREASED APPETITE: 0
BACK PAIN: 1
FEVER: 0
JAUNDICE: 1
BRUISES/BLEEDS EASILY: 1
WEIGHT LOSS: 0
BOWEL INCONTINENCE: 0
SNORES LOUDLY: 1
COUGH: 0
NERVOUS/ANXIOUS: 0

## 2018-11-05 ENCOUNTER — MYC MEDICAL ADVICE (OUTPATIENT)
Dept: NEUROLOGY | Facility: CLINIC | Age: 63
End: 2018-11-05

## 2018-11-07 ENCOUNTER — OFFICE VISIT (OUTPATIENT)
Dept: NEUROLOGY | Facility: CLINIC | Age: 63
End: 2018-11-07
Payer: COMMERCIAL

## 2018-11-07 VITALS
RESPIRATION RATE: 16 BRPM | OXYGEN SATURATION: 97 % | WEIGHT: 194 LBS | HEIGHT: 66 IN | DIASTOLIC BLOOD PRESSURE: 73 MMHG | TEMPERATURE: 98.4 F | HEART RATE: 95 BPM | BODY MASS INDEX: 31.18 KG/M2 | SYSTOLIC BLOOD PRESSURE: 134 MMHG

## 2018-11-07 DIAGNOSIS — G25.0 HEREDITARY ESSENTIAL TREMOR: Primary | ICD-10-CM

## 2018-11-07 PROBLEM — R63.5 WEIGHT GAIN: Status: ACTIVE | Noted: 2018-10-22

## 2018-11-07 PROBLEM — G47.00 INSOMNIA, UNSPECIFIED TYPE: Status: ACTIVE | Noted: 2018-10-22

## 2018-11-07 PROBLEM — R00.2 PALPITATIONS: Status: ACTIVE | Noted: 2018-10-22

## 2018-11-07 PROBLEM — R94.2 ABNORMAL PFTS (PULMONARY FUNCTION TESTS): Status: ACTIVE | Noted: 2018-10-22

## 2018-11-07 PROBLEM — R06.02 SOB (SHORTNESS OF BREATH): Status: ACTIVE | Noted: 2018-09-18

## 2018-11-07 RX ORDER — ROSUVASTATIN CALCIUM 10 MG/1
10 TABLET, COATED ORAL DAILY
COMMUNITY
Start: 2018-10-26 | End: 2022-11-07

## 2018-11-07 RX ORDER — BUPROPION HYDROCHLORIDE 300 MG/1
TABLET ORAL DAILY
Refills: 1 | COMMUNITY
Start: 2018-10-01

## 2018-11-07 ASSESSMENT — PAIN SCALES - GENERAL: PAINLEVEL: NO PAIN (0)

## 2018-11-07 NOTE — LETTER
"2018      RE: Hilary Cornelius  [address]  [facility] [address]       PCP: Zachary Holguin  Referring Provider: Dr. Jus Ybarra  Patient: Hilary Cornelius  : 1955  DEO: 2018    Chief Complaint: tremors    Vital signs: /73  P 95  Weight 194 lb    The patient has disabling familial essential tremor and is (i)  S/P left-side DBS electrode placement on 2017 and generator/battery on 3/24/2017 and (ii) S/P right-side DBS electrode placement on 2018 and generator/battery 2018.  She is returning to clinic for troubleshooting and reprogramming her left-side IPG.       Additional information relevant to this clinic visit is provided in the following note:  --------------------  Since her last [facility] visit the patient describes decreased frequence and severity of migraine-like headaches and an increase in tremor suppression bilaterally    Tremor Checklist  ------------------------  Handedness: Right handed  Speech: No dysarthria or quaver  Resting tremor: None even with distraction (both UEs)   Postural tremor: None with arms extended in UEs (bilaterally)  Kinetic tremor: no longer a yes-yes head tremor  Finger-to-nose testing (FTN): slight action tremor and terminal intention tremors, L > R  Sequential finger tapping (SFT): Well performed with both hands with slight contamination by an action tremor bilaterally  Drinking from a cup: Able to drink water from a cup with a minimal terminal intention tremor (both hands)  Toe tapping: Well performed, not arrhythmic with either foot   Station and Gait: Not stooped, deliberate but normal stride and pace,   Turns: Patient \"feels off balance all the time\", slight imbalance making turns    Relevant Medications  --------------------------  Gabapentin 600 m tab in the morning and 1 at night, last taken this morning at ~8:00 am    Primidone 250 m tab in the morning and 2 tabs in the evening, this morning at ~8:00 am      Allergies: " "Penicillin, bee stings, and Aspirin    PMH: See my progress note dated 06/28/2018.    PSH: See my progress note dated 06/28/2018.    FH:  See my progress note dated 06/28/2018.    SH: The patient drinks ~two alcoholic beverages/week, and this \"helps her tremor\".          PROGRAMMING RESULTS  LEFT VIM  -------------  Dr. Hernandez's final parameter values on 06/18/2018  Left Vim battery status:  2.98 volts  Left Vim FINAL parameter values: c+/2B-, 3.00 mA, 90 usec, 130 Hz  Program Impedance: not recorded  Therapy current: 3.00 mA   Allocated current range: none provided    My FINAL parameter values on 06/28/2018 (marked change from 06/18/2018)  Left Vim battery status:  2.94 volts  Left Vim FINAL parameter values: c+/2B-, 3.25 mA, 60 usec, 130 Hz  Program Impedance: 962 Ohms  Therapy current: 3.25 mA   Allocated current range: 2.00 to 3.50 mA     My FINAL parameter values on 11/07/2018 (no change from 06/28/2018)  Left Vim battery status:  2.94 volts  Left Vim FINAL parameter values: c+/2B-, 3.25 mA, 60 usec, 130 Hz  Program Impedance: 962 Ohms  Therapy current: 3.25 mA   Allocated current range: 2.00 to 3.50 mA        RIGHT VIM  --------------  Dr. Hernandez's final parameter values on 06/18/2018  Right Vim battery status: 2.98 volts  Right Vim FINAL parameter values: c+/10C-, 1.00 mA, 90 usec, 130 Hz  Program Impedance: not recorded  Therapy current: 1.00 mA   Allocated current range: none provided      My FINAL parameter values on 06/28/2018  Left Vim battery status:  2.98 volts  Left Vim parameter values: c+/10ABC-, 1.00 mA, 60 usec, 130 Hz  Program Impedance: 987 Ohms  Therapy current: 1.00 mA   Allocated current range: 0.50 to 1.10 mA    My FINAL parameter values on 11/07/2018  Right Vim battery status:  2.98 volts  Right Vim parameter values: c+/10ABC-, 1.00 mA, 60 usec, 130 Hz  Program Impedance: 1075 Ohms  Therapy current: 1.00 mA   Allocated current range: 0.50 to 1.10 mA    See scanned hard copy for more " detail.    Assessment  --------------------  1. Strong family history of ET, disabling tremors, marginal response to primidone, EDS most likely due to gabapentin, and significant imbalance,  2. Low-current thresholds for sensory and motor LEFT-body side effects.  3. Significant RUE tremor suppression with LEFT Vim DBS and significant LUE tremor suppression with RIGHT Vim DBS.  4. The patient's complaints about EDS and imbalance are likely due (at least in part) to long-term gabapentin and primidone medication.      Plan  -------  1. RTC in 6 months for battery check and reprogramming if necessary.  2. Suggest tapering off gabapentin and primidone -- one at a time -- in order to determine whether either or both drugs contribute significantly to the tremor suppression provided from DBS.  Also, these drugs are likely to cause and/or contribute to the patient's EDS and imbalance.  3. Update the patient's controller from software version 11 to version 12.      I spent 1.5 hours (i) interrogating the patient's two generators in order to extract the final parameter values from her last programming session in June, 2018; and (ii) performing a motor-disorders examination in order to determine if the results of this examination require modification of her current DBS parameter values.    JOANNA Flanagan, MSc ([employer]), MD  [facility] Neurologist    Randell Flanagan MD

## 2018-11-07 NOTE — MR AVS SNAPSHOT
"              After Visit Summary   2018    Hilary Cornelius    MRN: [id number]           Patient Information     Date Of Birth          1955        Visit Information        Provider Department      2018 1:30 PM Randell Flanagan MD [facility] Neurology        Today's Diagnoses     Hereditary essential tremor    -  1      Care Instructions    PCP: Zachary Holguin  Referring Provider: Dr. Jus Ybarra  Patient: Hilary Cornelius  : 1955  DEO: 2018    Chief Complaint: tremors    The patient has disabling familial essential tremor and is (i)  S/P left-side DBS electrode placement on 2017 and generator/battery on 3/24/2017 and (ii) S/P right-side DBS electrode placement on 2018 and generator/battery 2018.  She is returning to clinic for troubleshooting and reprogramming her left-side IPG.       Additional information relevant to this clinic visit is provided in the following note:  --------------------  Since her last [facility] visit the patient describes decreased frequence and severity of migraine-like headaches and an increase in tremor suppression bilaterally    Tremor Checklist  ------------------------  Handedness: Right handed  Speech: No dysarthria or quaver  Resting tremor: None even with distraction (both UEs)   Postural tremor: None with arms extended in bilateral UEs   Kinetic tremor: Head tremor: no longer yes-yes head tremor  Finger-to-nose esting (FTN): slight action and terminal intention tremors, L > R  Sequential finger tapping (SFT): well performed with both hands with slight contamination by an action tremor bilaterally  Drinking from a cup: Drinks water from a cup with minimal terminal in tremor (both hands)  Toe tapping: Slow and not arrhythmic with either foot   Station and Gait: Not stooped, normal stride and pace  Turns: Patient \"feels off balance all the timee\", slight imbalance making turns    Relevant " "Medications  -----------------------------   Gabapentin 600 m tab in the morning and 1 at night, last taken this morning at ~8:00 am    Primidone 250 m tab in the morning and 2 tabs in the evening, this morning at ~8:00 am      Allergies: Penicillin, bee stings, and Aspirin    PMH: See my progress note dated 2018.    PSH: See my progress note dated 2018.    FH:  See my progress note dated 2018.    SH: The patient drinks ~two alcoholic beverages/week, and this \"helps her tremor\".          PROGRAMMING RESULTS  LEFT VIM  -------------  Dr. Hernandez's final parameter values on 2018  Left Vim battery status:  2.98 volts  Left Vim FINAL parameter values: c+/2B-, 3.00 mA, 90 usec, 130 Hz  Program Impedance: not recorded  Therapy current: 3.00 mA   Allocated current range: none provided    My FINAL parameter values on 2018  Left Vim battery status:  2.94 volts  Left Vim FINAL parameter values: c+/2B-, 3.25 mA, 60 usec, 130 Hz  Program Impedance: 962 Ohms  Therapy current: 3.25 mA   Allocated current range: 2.00 to 3.50 mA     My FINAL parameter values on 2018  Left Vim battery status:  2.94 volts  Left Vim FINAL parameter values: c+/2B-, 3.25 mA, 60 usec, 130 Hz  Program Impedance: 962 Ohms  Therapy current: 3.25 mA   Allocated current range: 2.00 to 3.50 mA        RIGHT VIM  --------------  Dr. Hernandez's final parameter values on 2018  Right Vim battery status:  2.98 volts  Right Vim FINAL parameter values: c+/10C-, 1.00 mA, 90 usec, 130 Hz  Program Impedance: not recorded  Therapy current: 1.00 mA   Allocated current range: none provided      My FINAL parameter values on 2018  Left Vim battery status:  2.98 volts  Left Vim parameter values: c+/10ABC-, 1.00 mA, 60 usec, 130 Hz  Program Impedance: 987 Ohms  Therapy current: 1.00 mA   Allocated current range: 0.50 to 1.10 mA    My FINAL parameter values on 2018  Right Vim battery status:  2.98 volts  Right Vim " parameter values: c+/10ABC-, 1.00 mA, 60 usec, 130 Hz  Program Impedance: 1075 Ohms  Therapy current: 1.00 mA   Allocated current range: 0.50 to 1.10 mA         REPROGAMMING THE LEFT IPG    Left  Vim initial settings: c+/2ABC-, 0.50 mA, 60 usec, 130 Hz ->   Increase current from 0.50 to 0.75 mA, other parameter values unchanged ->   Increase current from 0.75 to 1.00 mA, other parameter values unchanged ->   Increase current from 1.00 to 1.25 mA, other parameter values unchanged ->   Increase current from 1.25 to 1.50 mA, other parameter values unchanged ->   Increase current from 1.50 to 1.75 mA, other parameter values unchanged ->   Increase current from 1.75 to 2.00 mA, other parameter values unchanged ->     -------------------------------------------------------------------------------------    Right Vim settings: c+/2ABC-, 1.00 mA, 60 usec, 130 Hz  Increase current from to 0.50 to 0.75 mA, other parameter values unchanged ->   Increase current from to 0.75 to 1.00 mA, other parameter values unchanged ->   Increase current from to 1.00 to 1.25 mA, other parameter values unchanged ->   Increase current from to 1.25 to 1.50 mA, other parameter values unchanged ->   Increase current from to 1.50 to 1.75 mA, other parameter values unchanged ->   Increase current from to 1.75 to 2.00 mA, other parameter values unchanged ->     ------------------------------------------    Assessment  --------------------  1. Strong family history of ET, disabling tremors, marginal response to primidone, EDS most likely due to gabapentin, and significant imbalance,  2. Low-current thresholds for sensory and motor LEFT (body) side effects.  3. Significant RUE tremor suppression with LEFT Vim DBS  4. The patient's complaints about EDS and imbalance is likely due to gabapentin since more than 20% of adult patients taking high-dose gabapentin suffer from somnolence, and a smaller percentage of patients suffer from dizziness and  "ataxia.    Plan  -------  1. RTC in 6 months for battery check and reprogramming if necessary.  2. To be followed in the interim by Dr. Hernandez in [place].   3. Suggest once again to taper off gabapentin to relieve EDS.       I spent... hours with the patient     JOANNA Flanagan MSc ([employer]), MD  [facility]    cc: Josseline Hernandez MD                     Follow-ups after your visit        Follow-up notes from your care team     Return in about 6 months (around 5/7/2019), or if symptoms worsen or fail to improve, for recheck.      Who to contact     Please call your clinic at [phone] to:    Ask questions about your health    Make or cancel appointments    Discuss your medicines    Learn about your test results    Speak to your doctor            Additional Information About Your Visit        [employer] Information     [employer] gives you secure access to your electronic health record. If you see a primary care provider, you can also send messages to your care team and make appointments. If you have questions, please call your primary care clinic.  If you do not have a primary care provider, please call [phone] and they will assist you.      Se[employer] is an electronic gateway that provides easy, online access to your medical records. With [employer], you can request a clinic appointment, read your test results, renew a prescription or communicate with your care team.     To access your existing account, please contact your [facility] or call [phone] for assistance.        Care EveryWhere ID     This is your Care EveryWhere ID. This could be used by other organizations to access your [facility] medical records  [id number]        Your Vitals Were     Pulse Temperature Respirations Height Pulse Oximetry Breastfeeding?    95 98.4  F (36.9  C) (Oral) 16 1.676 m (5' 6\") 97% No    BMI (Body Mass Index)                   31.31 kg/m2            Blood Pressure from Last 3 Encounters: "   11/07/18 134/73   06/28/18 126/86   06/28/18 126/86    Weight from Last 3 Encounters:   11/07/18 88 kg (194 lb)   06/28/18 88.4 kg (194 lb 14.4 oz)   06/28/18 88.4 kg (194 lb 14.4 oz)              Today, you had the following     No orders found for display       Primary Care Provider Office Phone # Fax #    Zachary Holguin [phone]       [facility] [address]  [facility] [address]        Equal Access to Services     [facility]: Hadii selina duarte hadasho Sodeangelo, waaxda luqadaha, qaybta kaalmada arin, ze brown . So [facility] [phone].    ATENCIÓN: Si habla español, tiene a paz disposición servicios gratuitos de asistencia lingüística. [facility] [phone].    We comply with applicable federal civil rights laws and [place] laws. We do not discriminate on the basis of race, color, national origin, age, disability, sex, sexual orientation, or gender identity.            Thank you!     Thank you for choosing [facility] NEUROLOGY  for your care. Our goal is always to provide you with excellent care. Hearing back from our patients is one way we can continue to improve our services. Please take a few minutes to complete the written survey that you may receive in the mail after your visit with us. Thank you!             Your Updated Medication List - Protect others around you: Learn how to safely use, store and throw away your medicines at www.disposemymeds.org.          This list is accurate as of 11/7/18  3:03 PM.  Always use your most recent med list.                   Brand Name Dispense Instructions for use Diagnosis    acetaminophen 325 MG tablet    TYLENOL    100 tablet    Take 2 tablets (650 mg) by mouth every 4 hours as needed for other (surgical pain)    Essential tremor       albuterol 108 (90 Base) MCG/ACT inhaler    PROAIR HFA/PROVENTIL HFA/VENTOLIN HFA     Inhale 2 puffs into the lungs every 6 hours        atorvastatin 10 MG tablet    LIPITOR      Take 10 mg by mouth daily        buPROPion 300 MG 24 hr tablet    WELLBUTRIN XL     daily        calcium carbonate 600 MG tablet    OS-GISELA 600 mg [place]. Ca     Take 600 mg by mouth daily        CRESTOR 10 MG tablet   Generic drug:  rosuvastatin      Take 10 mg by mouth daily        cyanocobalamin 2500 MCG sublingual tablet    VITAMIN B-12     Place 2,500 mcg under the tongue daily        ferrous fumarate 65 mg (Rosebud. FE)-Vitamin C 125 mg  MG Tabs tablet    VITRON C     Take 1 tablet by mouth daily    Essential tremor       folic acid 400 MCG tablet    FOLVITE     Take 400 mcg by mouth daily        gabapentin 800 MG tablet    NEURONTIN     Take 0.2 tablets by mouth 2 times daily        Multiple vitamin Tabs      Take 1 tablet by mouth daily        primidone 250 MG tablet    MYSOLINE     1 in am and 2 in the evening        PROBIOTIC ACIDOPHILUS PO      Take 1 capsule by mouth daily        sertraline 100 MG tablet    ZOLOFT     Take 0.5 tablets by mouth daily        TYLENOL PM EXTRA STRENGTH  MG tablet   Generic drug:  diphenhydrAMINE-acetaminophen      Take 2 tablets by mouth nightly as needed for sleep    Essential tremor       ZICAM COLD REMEDY Tbdp      Take 1 tablet by mouth 2 times daily    Essential tremor

## 2018-11-07 NOTE — NURSING NOTE
Chief Complaint   Patient presents with     Tremors     [facility] RETURN MOVEMENT DISORDER- 9 MONTH F/U ESSENTIAL TREMOR     Isidro Blanco, EMT

## 2018-11-07 NOTE — LETTER
"2018       RE: Hilary Cornelius  [address]  [facility] [address]     Dear Colleague,    Thank you for referring your patient, Hilary Cornelius, to the [facility] NEUROLOGY at [facility]. Please see a copy of my visit note below.    PCP: Zachary Holguin  Referring Provider: Dr. Jus Ybarra  Patient: Hilary Cornelius  : 1955  DEO: 2018    Chief Complaint: tremors    Vital signs: /73  P 95  Weight 194 lb    The patient has disabling familial essential tremor and is (i)  S/P left-side DBS electrode placement on 2017 and generator/battery on 3/24/2017 and (ii) S/P right-side DBS electrode placement on 2018 and generator/battery 2018.  She is returning to clinic for troubleshooting and reprogramming her left-side IPG.       Additional information relevant to this clinic visit is provided in the following note:  --------------------  Since her last [facility] visit the patient describes decreased frequence and severity of migraine-like headaches and an increase in tremor suppression bilaterally    Tremor Checklist  ------------------------  Handedness: Right handed  Speech: No dysarthria or quaver  Resting tremor: None even with distraction (both UEs)   Postural tremor: None with arms extended in UEs (bilaterally)  Kinetic tremor: no longer a yes-yes head tremor  Finger-to-nose testing (FTN): slight action tremor and terminal intention tremors, L > R  Sequential finger tapping (SFT): Well performed with both hands with slight contamination by an action tremor bilaterally  Drinking from a cup: Able to drink water from a cup with a minimal terminal intention tremor (both hands)  Toe tapping: Well performed, not arrhythmic with either foot   Station and Gait: Not stooped, deliberate but normal stride and pace,   Turns: Patient \"feels off balance all the time\", slight imbalance making turns    Relevant " "Medications  --------------------------  Gabapentin 600 m tab in the morning and 1 at night, last taken this morning at ~8:00 am    Primidone 250 m tab in the morning and 2 tabs in the evening, this morning at ~8:00 am      Allergies: Penicillin, bee stings, and Aspirin    PMH: See my progress note dated 2018.    PSH: See my progress note dated 2018.    FH:  See my progress note dated 2018.    SH: The patient drinks ~two alcoholic beverages/week, and this \"helps her tremor\".          PROGRAMMING RESULTS  LEFT VIM  -------------  Dr. Hernandez's final parameter values on 2018  Left Vim battery status:  2.98 volts  Left Vim FINAL parameter values: c+/2B-, 3.00 mA, 90 usec, 130 Hz  Program Impedance: not recorded  Therapy current: 3.00 mA   Allocated current range: none provided    My FINAL parameter values on 2018 (marked change from 2018)  Left Vim battery status:  2.94 volts  Left Vim FINAL parameter values: c+/2B-, 3.25 mA, 60 usec, 130 Hz  Program Impedance: 962 Ohms  Therapy current: 3.25 mA   Allocated current range: 2.00 to 3.50 mA     My FINAL parameter values on 2018 (no change from 2018)  Left Vim battery status:  2.94 volts  Left Vim FINAL parameter values: c+/2B-, 3.25 mA, 60 usec, 130 Hz  Program Impedance: 962 Ohms  Therapy current: 3.25 mA   Allocated current range: 2.00 to 3.50 mA        RIGHT VIM  --------------  Dr. Hernandez's final parameter values on 2018  Right Vim battery status: 2.98 volts  Right Vim FINAL parameter values: c+/10C-, 1.00 mA, 90 usec, 130 Hz  Program Impedance: not recorded  Therapy current: 1.00 mA   Allocated current range: none provided      My FINAL parameter values on 2018  Left Vim battery status:  2.98 volts  Left Vim parameter values: c+/10ABC-, 1.00 mA, 60 usec, 130 Hz  Program Impedance: 987 Ohms  Therapy current: 1.00 mA   Allocated current range: 0.50 to 1.10 mA    My FINAL parameter values on " 11/07/2018  Right Vim battery status:  2.98 volts  Right Vim parameter values: c+/10ABC-, 1.00 mA, 60 usec, 130 Hz  Program Impedance: 1075 Ohms  Therapy current: 1.00 mA   Allocated current range: 0.50 to 1.10 mA    See scanned hard copy for more detail.    Assessment  --------------------  1. Strong family history of ET, disabling tremors, marginal response to primidone, EDS most likely due to gabapentin, and significant imbalance,  2. Low-current thresholds for sensory and motor LEFT-body side effects.  3. Significant RUE tremor suppression with LEFT Vim DBS and significant LUE tremor suppression with RIGHT Vim DBS.  4. The patient's complaints about EDS and imbalance are likely due (at least in part) to long-term gabapentin and primidone medication.      Plan  -------  1. RTC in 6 months for battery check and reprogramming if necessary.  2. Suggest tapering off gabapentin and primidone -- one at a time -- in order to determine whether either or both drugs contribute significantly to the tremor suppression provided from DBS.  Also, these drugs are likely to cause and/or contribute to the patient's EDS and imbalance.  3. Update the patient's controller from software version 11 to version 12.      I spent 1.5 hours (i) interrogating the patient's two generators in order to extract the final parameter values from her last programming session in June, 2018; and (ii) performing a motor-disorders examination in order to determine if the results of this examination require modification of her current DBS parameter values.    JOANNA Flanagan, MSc ([employer]), MD  [facility] Neurologist

## 2018-11-08 RX ORDER — GABAPENTIN 800 MG/1
160 TABLET ORAL 2 TIMES DAILY
Qty: 90 TABLET | Refills: 1 | Status: CANCELLED | OUTPATIENT
Start: 2018-11-08

## 2018-11-08 NOTE — PROGRESS NOTES
"PCP: Zachary Holguin  Referring Provider: Dr. Jus Ybarra  Patient: Hilary Cornelius  : 1955  DEO: 2018    Chief Complaint: tremors    Vital signs: /73  P 95  Weight 194 lb    The patient has disabling familial essential tremor and is (i)  S/P left-side DBS electrode placement on 2017 and generator/battery on 3/24/2017 and (ii) S/P right-side DBS electrode placement on 2018 and generator/battery 2018.  She is returning to clinic for troubleshooting and reprogramming her left-side IPG.       Additional information relevant to this clinic visit is provided in the following note:  --------------------  Since her last [facility] [place] the patient describes decreased frequence and severity of migraine-like headaches and an increase in tremor suppression bilaterally    Tremor Checklist  ------------------------  Handedness: Right handed  Speech: No dysarthria or quaver  Resting tremor: None even with distraction (both UEs)   Postural tremor: None with arms extended in UEs (bilaterally)  Kinetic tremor: no longer a yes-yes head tremor  Finger-to-nose testing (FTN): slight action tremor and terminal intention tremors, L > R  Sequential finger tapping (SFT): Well performed with both hands with slight contamination by an action tremor bilaterally  Drinking from a cup: Able to drink water from a cup with a minimal terminal intention tremor (both hands)  Toe tapping: Well performed, not arrhythmic with either foot   Station and Gait: Not stooped, deliberate but normal stride and pace,   Turns: Patient \"feels off balance all the time\", slight imbalance making turns    Relevant Medications  --------------------------  Gabapentin 600 m tab in the morning and 1 at night, last taken this morning at ~8:00 am    Primidone 250 m tab in the morning and 2 tabs in the evening, this morning at ~8:00 am      Allergies: Penicillin, bee stings, and Aspirin    PMH: See my progress note dated " "06/28/2018.    PSH: See my progress note dated 06/28/2018.    FH:  See my progress note dated 06/28/2018.    SH: The patient drinks ~two alcoholic beverages/week, and this \"helps her tremor\".          PROGRAMMING RESULTS  LEFT VIM  -------------  Dr. Hernandez's final parameter values on 06/18/2018  Left Vim battery status:  2.98 volts  Left Vim FINAL parameter values: c+/2B-, 3.00 mA, 90 usec, 130 Hz  Program Impedance: not recorded  Therapy current: 3.00 mA   Allocated current range: none provided    My FINAL parameter values on 06/28/2018 (marked change from 06/18/2018)  Left Vim battery status:  2.94 volts  Left Vim FINAL parameter values: c+/2B-, 3.25 mA, 60 usec, 130 Hz  Program Impedance: 962 Ohms  Therapy current: 3.25 mA   Allocated current range: 2.00 to 3.50 mA     My FINAL parameter values on 11/07/2018 (no change from 06/28/2018)  Left Vim battery status:  2.94 volts  Left Vim FINAL parameter values: c+/2B-, 3.25 mA, 60 usec, 130 Hz  Program Impedance: 962 Ohms  Therapy current: 3.25 mA   Allocated current range: 2.00 to 3.50 mA        RIGHT VIM  --------------  Dr. Hernandez's final parameter values on 06/18/2018  Right Vim battery status: 2.98 volts  Right Vim FINAL parameter values: c+/10C-, 1.00 mA, 90 usec, 130 Hz  Program Impedance: not recorded  Therapy current: 1.00 mA   Allocated current range: none provided      My FINAL parameter values on 06/28/2018  Left Vim battery status:  2.98 volts  Left Vim parameter values: c+/10ABC-, 1.00 mA, 60 usec, 130 Hz  Program Impedance: 987 Ohms  Therapy current: 1.00 mA   Allocated current range: 0.50 to 1.10 mA    My FINAL parameter values on 11/07/2018  Right Vim battery status:  2.98 volts  Right Vim parameter values: c+/10ABC-, 1.00 mA, 60 usec, 130 Hz  Program Impedance: 1075 Ohms  Therapy current: 1.00 mA   Allocated current range: 0.50 to 1.10 mA    See scanned hard copy for more detail.    Assessment  --------------------  1. Strong family history of ET, " disabling tremors, marginal response to primidone, EDS most likely due to gabapentin, and significant imbalance,  2. Low-current thresholds for sensory and motor LEFT-body side effects.  3. Significant RUE tremor suppression with LEFT Vim DBS and significant LUE tremor suppression with RIGHT Vim DBS.  4. The patient's complaints about EDS and imbalance are likely due (at least in part) to long-term gabapentin and primidone medication.      Plan  -------  1. RTC in 6 months for battery check and reprogramming if necessary.  2. Suggest tapering off gabapentin and primidone -- one at a time -- in order to determine whether either or both drugs contribute significantly to the tremor suppression provided from DBS.  Also, these drugs are likely to cause and/or contribute to the patient's EDS and imbalance.  3. Update the patient's controller from software version 11 to version 12.      I spent 1.5 hours (i) interrogating the patient's two generators in order to extract the final parameter values from her last programming session in June, 2018; and (ii) performing a motor-disorders examination in order to determine if the results of this examination require modification of her current DBS parameter values.    JOANNA Flanagan, MSc ([employer]), MD  [facility] Neurologist

## 2018-11-16 DIAGNOSIS — G25.0 ESSENTIAL TREMOR: Primary | ICD-10-CM

## 2018-11-16 RX ORDER — GABAPENTIN 600 MG/1
600 TABLET ORAL 2 TIMES DAILY
Qty: 180 TABLET | Refills: 1 | Status: SHIPPED | OUTPATIENT
Start: 2018-11-16 | End: 2019-11-07

## 2019-05-16 ENCOUNTER — DOCUMENTATION ONLY (OUTPATIENT)
Dept: CARE COORDINATION | Facility: CLINIC | Age: 64
End: 2019-05-16

## 2019-06-19 ENCOUNTER — TELEPHONE (OUTPATIENT)
Dept: NEUROLOGY | Facility: CLINIC | Age: 64
End: 2019-06-19

## 2019-06-19 NOTE — TELEPHONE ENCOUNTER
Left voice mail : Dr. Flanagan [place] so we need to reschedule your appointment. Would you be able to come on 7/31 at 12:00 to see Dr. Joi Ponce? She has worked with Dr. Flanagan very closely.    Please let me know, by calling [phone] to confirm this appointment.

## 2019-06-20 NOTE — TELEPHONE ENCOUNTER
Spoke to patient. She is already coming down on 7/18 for her 1 year neuropsych and does not want to come down twice. They are planning on getting to the [facility] on 7/17 and could come for an appointment with Dr. Ponce that day at 3:00.    She needs her [employer] system updated so I will plan on a Deep Brain Stimulation nurse visit as well. Her orthopedic surgeon may want to do surgery on her knee and she will be needing an MRI. Therefore this update is critical.

## 2019-06-20 NOTE — TELEPHONE ENCOUNTER
[facility] Call Center    Phone Message    May a detailed message be left on voicemail: yes    Reason for Call: Other: Pt cannot come on July 31st - please call her back to discuss alternate dates - Pt returning a call to Ivana Martin - Pt said she lives too far away to come twice in July - wants to know if you can get it all on one date instead of two dates - Pt wants to know if you can add her on for around July 18th when they are already coming down for a [employer] game - they don't want to make multiple visits - Thanks     Action Taken: Message routed to:  [facility] Neurology Clinic

## 2019-07-17 ENCOUNTER — OFFICE VISIT (OUTPATIENT)
Dept: NEUROLOGY | Facility: CLINIC | Age: 64
End: 2019-07-17
Payer: COMMERCIAL

## 2019-07-17 VITALS
BODY MASS INDEX: 32.06 KG/M2 | HEART RATE: 74 BPM | TEMPERATURE: 98 F | SYSTOLIC BLOOD PRESSURE: 128 MMHG | OXYGEN SATURATION: 97 % | WEIGHT: 199.5 LBS | DIASTOLIC BLOOD PRESSURE: 83 MMHG | HEIGHT: 66 IN | RESPIRATION RATE: 16 BRPM

## 2019-07-17 DIAGNOSIS — Z79.899 MEDICATION MANAGEMENT: ICD-10-CM

## 2019-07-17 DIAGNOSIS — G25.0 ESSENTIAL TREMOR: Primary | ICD-10-CM

## 2019-07-17 PROBLEM — E66.811 OBESITY, CLASS I, BMI 30-34.9: Status: ACTIVE | Noted: 2019-05-13

## 2019-07-17 PROBLEM — R63.0 LACK OF APPETITE: Status: ACTIVE | Noted: 2019-06-26

## 2019-07-17 PROBLEM — I77.810 ASCENDING AORTA DILATATION (H): Status: ACTIVE | Noted: 2019-05-13

## 2019-07-17 PROBLEM — F33.41 RECURRENT MAJOR DEPRESSIVE DISORDER, IN PARTIAL REMISSION (H): Status: ACTIVE | Noted: 2019-04-08

## 2019-07-17 ASSESSMENT — PAIN SCALES - GENERAL: PAINLEVEL: NO PAIN (0)

## 2019-07-17 ASSESSMENT — MIFFLIN-ST. JEOR: SCORE: 1476.68

## 2019-07-17 NOTE — PROGRESS NOTES
"Department of Neurology  Movement Disorders Division     Patient: Hilary Cornelius   MRN: [id number]   : 1955   Date of Visit: 2019     Reason for visit: ET follow up    History of Present Illness  Ms. Cornelius is a 64 year old R handed female with PMH of Essential Tremor s/p b/l VIM DBS (L VIM 3/14/2017, R VIM 3/28/2018) placement that presents to Neurology Movement clinic for follow up. Patient was last seen on 2018 and suggested to taper off of gabapentin and primidone.     History obtained from patient and accompanied by . Patient reports that her tremors are unchanged since last clinic visit.  She states that her tremors are well controlled on her current DBS settings as well as oral medications. She says that compared to prior to DBS placement, her tremors are markedly improved and to her resolved.  She states that she turns off her DBS at night. She charges her DBS every 3-2 days and charges it over night so is uncertain how long it takes to charge. She does not want to change her DBS settings this visit. She does note that her \"balance is off\" and has been bad since  after a back injury. She denies numbness and tingling and denies dragging her feet.     Patient decreased her gabapentin to 300 mg bid but noticed worsening back pain, thus when back to her usual dose. She tried to decrease her primidone to one tab bid, but noticed her tremors worsened and returned to her original dose. Tremors were worse in that she was unable to  a glass without using 2 hands, and noticed more spilling and dropping objects. There was a time around  that she ran out of primidone and was not able to fill it. She was off this med for 3 days. Denies SEs to primidone or gabapentin.     Review of Systems:  Other than that mentioned above, the remainder of 12 systems reviewed were negative.    PMH: unchanged  PSH: unchanged  FH: unchanged  SH: unchanged    Medications:  Current Outpatient " "Medications   Medication Sig Dispense Refill     acetaminophen (TYLENOL) 325 MG tablet Take 2 tablets (650 mg) by mouth every 4 hours as needed for other (surgical pain) 100 tablet      albuterol (PROAIR HFA, PROVENTIL HFA, VENTOLIN HFA) 108 (90 BASE) MCG/ACT inhaler Inhale 2 puffs into the lungs every 6 hours       buPROPion (WELLBUTRIN XL) 300 MG 24 hr tablet daily  1     calcium carbonate (OS-GISELA 600 MG [place]. [place]) 600 MG tablet Take 600 mg by mouth daily       cyabnocobalamin (VITAMIN B-12) 2500 MCG sublingual tablet Place 2,500 mcg under the tongue daily       diphenhydrAMINE-acetaminophen (TYLENOL PM EXTRA STRENGTH)  MG tablet Take 2 tablets by mouth nightly as needed for sleep       ferrous fumarate 65 mg, [place]. FE,-Vitamin C 125 mg (VITRON C)  MG TABS tablet Take 1 tablet by mouth daily       folic acid (FOLVITE) 400 MCG tablet Take 400 mcg by mouth daily       gabapentin (NEURONTIN) 600 MG tablet Take 1 tablet (600 mg) by mouth 2 times daily 180 tablet 1     Lactobacillus (PROBIOTIC ACIDOPHILUS PO) Take 1 capsule by mouth daily       Multiple vitamin TABS Take 1 tablet by mouth daily       primidone (MYSOLINE) 250 MG tablet 1 in am and 2 in the evening       atorvastatin (LIPITOR) 10 MG tablet Take 10 mg by mouth daily       Homeopathic Products (ZICAM COLD REMEDY) TBDP Take 1 tablet by mouth 2 times daily       rosuvastatin (CRESTOR) 10 MG tablet Take 10 mg by mouth daily       sertraline (ZOLOFT) 100 MG tablet Take 0.5 tablets by mouth daily            Movement Disorder-related Medications                   8 am 7 pm   Gabapentin 600 mg 1 1   Primidone 250 mg  1 2   Last taken at 6 am.      Physical Exam:  The patient's  height is 1.676 m (5' 6\") and weight is 90.5 kg (199 lb 8 oz). Her oral temperature is 98  F (36.7  C). Her blood pressure is 128/83 and her pulse is 74. Her respiration is 16 and oxygen saturation is 97%.      Physical Exam:  Gen: alert, active, attentive, appropriately groomed "   HEENT: normocephalic, eyes open with no discharge, nares patent, oropharynx clear-no lesions  Chest: normal configuration, chest rise equal b/l, non labored breathing   Extremities: no edema/cyanosis in BUE/BLE, distal pulses intact  Psych: mood stable     Neurologic Exam:  Mental status: Alert and oriented to person, place, time, and situation. Follows commands. Recent and remote memory intact. Attention span and concentration intact. Fund of knowledge intact to current events.   Speech: Fluent, intact comprehension and articulation  CN: visual fields intact in all fields, EOMIB,  no nystagmus, normal saccades, facial sensation intact, facial movement symmetric, hearing grossly intact to conversation, tongue protrudes midline   Motor: Moves all extremities equally against gravity without difficulty, normal axial and appendicular tone  Sensation: intact to light touch throughout   Coordination: grossly intact with FTN, HTS  Gait: able to rise unassisted from a seated position though slightly slowed, decreased arm swing and normal length of gait, no en bloc turns, slightly unsteady with turns, no ataxia    Tremor evaluation -   Head: slight No-no with ambulation   Face: very slight in lower face  Voice: none   Posture (R; L): very slight proximal tremor in batwing position; very slight with posture  Kinetic (R/L): 0.5 mm; 1 mm  Lower limb (R; L): none in RLE, very slight in LLE < 0.5 mm  Standing: none  Hand writing: no tremors, legible  Archimedes spiral: only slightly tremulous; tremulous but does not cross lines  Dot approximation (R; L): 3-5 mm, 3 mm    Battery status  ON/OFF: ON  - R VIM: 2.98 V  - L VIM: 2.94 V  Implanted generator: Infinity ([id number])  Lead site(s): VIM b/l   Impedance Check:   Impedances were checked and no issues were found in patient's leads.  - L VIM 1000 ohms  - R  ohms      Group A Left Brain         Right Brain       Initial Final Initial Final   Amplitude (mA)  3.25  no change   1.0  no change    Pulse width (usec)  60    60     Freq (Hz)  130    130     Contacts: C  +    +     Contacts: 3 / 12           Contacts: 2 / 11  2B-         Contacts: 1 / 10      10 ABC-     Contacts: 0 / 9       Patient    2.0 - 3.5 mA    0.5 - 1.10 mA      * = indicates change  ( ) = previous setting     Impression:  Hilary Cornelius is a 64 year old female with Essential Tremors here for DBS interrogation and programming. Patient is currently satisfied with DBS settings and not changes were made this visit. Impedances were checked and no issues were found in patient's leads. She continues to be on primidone and gabapentin and denies SEs to this med. She is interested in decreasing the amount of tremor meds she is on.     1. Essential tremor     Plan:   - No changes made to DBS settings or tremor meds at this time.   - Instructed to decrease primidone 250 mg to 1 tab bid a few days prior to next clinic appointment. We will adjust DBS settings at this time to improve tremors. We plan to continue decreasing primidone by one tab at a time for subsequent visits and adjusting DBS setting accordingly until she is off primidone entirely.     RTC: 4 months    Joi Ponce DO  Movement Disorders Fellow    Patient seen and discussed with Dr. Armijo.     Neurology Attending Attestation:     I, Orquidea Armijo, personally saw this patient with our Movement Disorders Fellow and agree with the fellow's findings and plan of care as documented in the movement disorder fellow's note. I personally performed salient aspects of the history and neurological examination.     I personally reviewed the vital signs, medications, and labs. I personally viewed the imaging, and agree with the interpretation documented by the fellow.    I personally performed or supervised all procedures.    Time spent with patient: Greater than 50% of this 45 minute visit was spent in counseling and coordination of care related to  medication management, DBS. tremor.    Additional time spent for separate DBS programming: DBS Interrogated without reprogramming.    Orquidea Armijo MD    of Neurology

## 2019-07-17 NOTE — NURSING NOTE
Chief Complaint   Patient presents with     DBS Programming     [facility] DEEP BRAIN STIMULATION F/U       Isidro Blanco, EMT

## 2019-07-17 NOTE — PATIENT INSTRUCTIONS
Plan:  - No changes to your DBS settings or tremor medications this visit.  - For the next appointment, a few days prior, decrease you primidone dose to one tab twice a day. Your tremor will return but we will adjust your DBS settings to improve your tremor.    We will see you back in late October or early November.

## 2019-07-17 NOTE — LETTER
"2019       RE: Hilary Cornelius  [address]  [facility] [address]     Dear Colleague,    Thank you for referring your patient, Hilary Cornelius, to the [facility] NEUROLOGY at [facility]. Please see a copy of my visit note below.    Department of Neurology  Movement Disorders Division     Patient: Hilary Cornelius   MRN: [id number]   : 1955   Date of Visit: 2019     Reason for visit: ET follow up    History of Present Illness  Ms. Cornelius is a 64 year old R handed female with PMH of Essential Tremor s/p b/l VIM DBS (L VIM 3/14/2017, R VIM 3/28/2018) placement that presents to Neurology Movement clinic for follow up. Patient was last seen on 2018 and suggested to taper off of gabapentin and primidone.     History obtained from patient and accompanied by . Patient reports that her tremors are unchanged since last clinic visit.  She states that her tremors are well controlled on her current DBS settings as well as oral medications. She says that compared to prior to DBS placement, her tremors are markedly improved and to her resolved.  She states that she turns off her DBS at night. She charges her DBS every 3-2 days and charges it over night so is uncertain how long it takes to charge. She does not want to change her DBS settings this visit. She does note that her \"balance is off\" and has been bad since  after a back injury. She denies numbness and tingling and denies dragging her feet.     Patient decreased her gabapentin to 300 mg bid but noticed worsening back pain, thus when back to her usual dose. She tried to decrease her primidone to one tab bid, but noticed her tremors worsened and returned to her original dose. Tremors were worse in that she was unable to  a glass without using 2 hands, and noticed more spilling and dropping objects. There was a time around  that she ran out of primidone and was not able to fill it. She was " off this med for 3 days. Denies SEs to primidone or gabapentin.     Review of Systems:  Other than that mentioned above, the remainder of 12 systems reviewed were negative.    PMH: unchanged  PSH: unchanged  FH: unchanged  SH: unchanged    Medications:  Current Outpatient Medications   Medication Sig Dispense Refill     acetaminophen (TYLENOL) 325 MG tablet Take 2 tablets (650 mg) by mouth every 4 hours as needed for other (surgical pain) 100 tablet      albuterol (PROAIR HFA, PROVENTIL HFA, VENTOLIN HFA) 108 (90 BASE) MCG/ACT inhaler Inhale 2 puffs into the lungs every 6 hours       buPROPion (WELLBUTRIN XL) 300 MG 24 hr tablet daily  1     calcium carbonate (OS-GISELA 600 MG [place]. [place]) 600 MG tablet Take 600 mg by mouth daily       cyabnocobalamin (VITAMIN B-12) 2500 MCG sublingual tablet Place 2,500 mcg under the tongue daily       diphenhydrAMINE-acetaminophen (TYLENOL PM EXTRA STRENGTH)  MG tablet Take 2 tablets by mouth nightly as needed for sleep       ferrous fumarate 65 mg, [place]. FE,-Vitamin C 125 mg (VITRON C)  MG TABS tablet Take 1 tablet by mouth daily       folic acid (FOLVITE) 400 MCG tablet Take 400 mcg by mouth daily       gabapentin (NEURONTIN) 600 MG tablet Take 1 tablet (600 mg) by mouth 2 times daily 180 tablet 1     Lactobacillus (PROBIOTIC ACIDOPHILUS PO) Take 1 capsule by mouth daily       Multiple vitamin TABS Take 1 tablet by mouth daily       primidone (MYSOLINE) 250 MG tablet 1 in am and 2 in the evening       atorvastatin (LIPITOR) 10 MG tablet Take 10 mg by mouth daily       Homeopathic Products (ZICAM COLD REMEDY) TBDP Take 1 tablet by mouth 2 times daily       rosuvastatin (CRESTOR) 10 MG tablet Take 10 mg by mouth daily       sertraline (ZOLOFT) 100 MG tablet Take 0.5 tablets by mouth daily            Movement Disorder-related Medications                   8 am 7 pm   Gabapentin 600 mg 1 1   Primidone 250 mg  1 2   Last taken at 6 am.      Physical Exam:  The patient's  height  "is 1.676 m (5' 6\") and weight is 90.5 kg (199 lb 8 oz). Her oral temperature is 98  F (36.7  C). Her blood pressure is 128/83 and her pulse is 74. Her respiration is 16 and oxygen saturation is 97%.      Physical Exam:  Gen: alert, active, attentive, appropriately groomed   HEENT: normocephalic, eyes open with no discharge, nares patent, oropharynx clear-no lesions  Chest: normal configuration, chest rise equal b/l, non labored breathing   Extremities: no edema/cyanosis in BUE/BLE, distal pulses intact  Psych: mood stable     Neurologic Exam:  Mental status: Alert and oriented to person, place, time, and situation. Follows commands. Recent and remote memory intact. Attention span and concentration intact. Fund of knowledge intact to current events.   Speech: Fluent, intact comprehension and articulation  CN: visual fields intact in all fields, EOMIB,  no nystagmus, normal saccades, facial sensation intact, facial movement symmetric, hearing grossly intact to conversation, tongue protrudes midline   Motor: Moves all extremities equally against gravity without difficulty, normal axial and appendicular tone  Sensation: intact to light touch throughout   Coordination: grossly intact with FTN, HTS  Gait: able to rise unassisted from a seated position though slightly slowed, decreased arm swing and normal length of gait, no en bloc turns, slightly unsteady with turns, no ataxia    Tremor evaluation -   Head: slight No-no with ambulation   Face: very slight in lower face  Voice: none   Posture (R; L): very slight proximal tremor in batwing position; very slight with posture  Kinetic (R/L): 0.5 mm; 1 mm  Lower limb (R; L): none in RLE, very slight in LLE < 0.5 mm  Standing: none  Hand writing: no tremors, legible  Archimedes spiral: only slightly tremulous; tremulous but does not cross lines  Dot approximation (R; L): 3-5 mm, 3 mm    Battery status  ON/OFF: ON  - R VIM: 2.98 V  - L VIM: 2.94 V  Implanted generator: Infinity " ([id number])  Lead site(s): VIM b/l   Impedance Check:   Impedances were checked and no issues were found in patient's leads.  - L VIM 1000 ohms  - R  ohms      Group A Left Brain         Right Brain       Initial Final Initial Final   Amplitude (mA)  3.25  no change  1.0  no change    Pulse width (usec)  60    60     Freq (Hz)  130    130     Contacts: C  +    +     Contacts: 3 / 12           Contacts: 2 / 11  2B-         Contacts: 1 / 10      10 ABC-     Contacts: 0 / 9       Patient    2.0 - 3.5 mA    0.5 - 1.10 mA      * = indicates change  ( ) = previous setting     Impression:  Hilary Cornelius is a 64 year old female with Essential Tremors here for DBS interrogation and programming. Patient is currently satisfied with DBS settings and not changes were made this visit. Impedances were checked and no issues were found in patient's leads. She continues to be on primidone and gabapentin and denies SEs to this med. She is interested in decreasing the amount of tremor meds she is on.     1. Essential tremor     Plan:   - No changes made to DBS settings or tremor meds at this time.   - Instructed to decrease primidone 250 mg to 1 tab bid a few days prior to next clinic appointment. We will adjust DBS settings at this time to improve tremors. We plan to continue decreasing primidone by one tab at a time for subsequent visits and adjusting DBS setting accordingly until she is off primidone entirely.     RTC: 4 months    Joi Ponce DO  Movement Disorders Fellow    Patient seen and discussed with Dr. Armijo.     Neurology Attending Attestation:     I, Orquidea Armijo, personally saw this patient with our Movement Disorders Fellow and agree with the fellow's findings and plan of care as documented in the movement disorder fellow's note. I personally performed salient aspects of the history and neurological examination.     I personally reviewed the vital signs, medications, and labs. I personally  viewed the imaging, and agree with the interpretation documented by the fellow.    I personally performed or supervised all procedures.    Time spent with patient: Greater than 50% of this 45 minute visit was spent in counseling and coordination of care related to medication management, DBS. tremor.    Additional time spent for separate DBS programming: DBS Interrogated without reprogramming.    Orquidea Armijo MD    of Neurology

## 2019-07-18 ENCOUNTER — OFFICE VISIT (OUTPATIENT)
Dept: NEUROPSYCHOLOGY | Facility: CLINIC | Age: 64
End: 2019-07-18
Payer: COMMERCIAL

## 2019-07-18 DIAGNOSIS — G25.0 ESSENTIAL TREMOR: Primary | ICD-10-CM

## 2019-07-18 DIAGNOSIS — F09 MENTAL DISORDER DUE TO GENERAL MEDICAL CONDITION: ICD-10-CM

## 2019-07-19 NOTE — NURSING NOTE
The patient was seen for neuropsychological evaluation at the request of Dr. Mackenzie Montano, for the purposes of diagnostic clarification and treatment planning. 200 minutes of test administration and scoring were provided by this writer, Domenico Hansen. Please see Dr. Judith Ayala's report for a full interpretation of the findings.

## 2019-08-07 NOTE — PROGRESS NOTES
Name: Hilary Cornelius MRN: [id number]  : 1955  CORCORAN: 2019  Staff: TRENT Tech: KERRY Age: 64  Sex: Female Hand: Right   Educ: 12  Occupation: Former  Owner  Vision:  ?with correction / ?without correction  Hearing:  ?with correction / ?without correction    WAIS-IV     Raw SSa    Similarities  21 8     Comprehension  21 8     Letter Num. Seq. 17 8     Digit Span  21 7     Matrix Reasoning 16 11    WMS-Revised  Raw    MAS     Info & Orientation 14       LM I   15 5     LM II   13 7     VR I   25 6     VR II   22 8    BEARD VERBAL LEARNING TEST - REVISED  Form   5  Raw T     Trial 1   6     Trial 2   8     Trial 3   9     Total 1-3  23 42     Learning  3     Delayed Recall  6 32     Percent Retention 67 33     True Positives  12     Discrimination Index 12 56     False Positives  0    BOSTON NAMING TEST   Score: 50 MAS: 7  16%ile                    [ 50   w/o cues        3   w/phonemic cues]     COWAT (CFL)   Score: 16 MAS: 3    SEMANTIC FLUENCY   Score: 32 MAS: 7    CLOCK DRAWING     Command   3/3             Copy     3/3    TRAIL MAKING TEST    Raw         Err  MAS  %ile   A 63            0               4             2   B 115        0               7             16      STROOP   Raw + Eddie  =   Total          MAS %ile    Word 86 +     --  = 86 8 25   Color  50 +     --= 50 6 9       Color/Word  38 +    --= 38 11 63       WCST (64 cards)    Raw   T/%ile   Categories: 4 >16   #Persev. Err.: 6 68   Concept. Resp.: 52 66   FTMS:  0    SADLER JUDGMENT OF LINE ORIENTATION Form H   Raw: 17  MAS: 7  16%ile:    DEMENTIA RATING SCALE - 2 Standard       Raw       MAS            Raw      MAS  Attention  34  8        Concept   36           9  Init/Psv  36  10  Memory   24       10  Construct 6  10   Total     136/144     9     GDS:  5   APATHY SCALE:        10     MAS = [place] Older Adult Normative Study Age Adj. Scaled Score

## 2019-08-07 NOTE — PROGRESS NOTES
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 Information was obtained via interview with the patient and review of her medical records. Records indicate a history of essential tremor, and she is status post bilateral VIM DBS (L VIM 3/14/2017, R VIM 3/28/2018) placement. At a recent movement disorders clinic visit, she reported that her tremors are well-controlled on her current DBS settings as well as oral medications. She was referred for neuropsychological evaluation by Orquidea Armijo M.D., for one year follow up after surgery, for further characterization of any cognitive difficulties and to assist with treatment planning.     Ms. Cornelius has undergone two prior neuropsychological evaluations under my direction, prior to each surgery, on 11/10/2016, and 11/28/2017. Please refer to the reports from those dates for full details regarding her history and the findings of the evaluations. Briefly, results of her 2016 evaluation indicated overall intellectual functioning estimated to fall in the mildly impaired range, marginally below premorbid estimates of low average based on single word reading abilities. She demonstrated a relative inefficiency in learning of new material, but retained information quite well once she learned it. Visuospatial abilities reflected a weakness for her. Performance across other cognitive domains otherwise was generally consistent with her level of intellectual functioning, and she had a strength in executive functioning. She denied significant depressive symptomatology at the time. Results of her 2017 evaluation indicated intellectual functioning  estimated to fall in the borderline range, with mild difficulty learning new information, while she retained information well once she learned it. Fluency was slowed. Performance otherwise fell within normal limits across cognitive domains. Compared to her November 2016 evaluation she had improvements in memory and visual processing, a decline in phonemic fluency, and performance that otherwise remained stable across cognitive domains.    CLINICAL INTERVIEW FINDINGS    Upon interview, Ms. Cornelius stated that she has had good tremor control after bilateral DBS surgery. The only thing that she was unhappy with is that her hair did not grow back well. She has not noticed any changes in cognition. She misplaces items and notices occasional difficulty with word finding, but she does not believe that these reflect changes for her. She is not forgetting names of familiar people, forgetting things that others have said, or becoming lost in familiar places. She lives with her , and she is responsible for managing their finances. She occasionally forgets to pay bills because she has so much to do, but this has not been a significant problem. She has had some trouble keeping track of her 's different insurance. She manages her own medications, driving, and cooking, apparently without difficulty. She handles her personal cares independently.    Ms. Cornelius denied any history of psychiatric illness, although records suggest a history of depression. She described her mood as okay, but stated that she just has no energy and that she is tired all the time. This has been going on for a year, since her surgery, and she feels that she has not been able to gain her strength back. She does not feel depressed, but noted that her doctor put her on medications that do not seem to help. Nothing has changed even after trying three or four different medications, and she stated that she does not have the  pep  that she would like.  She has not felt guilty or anxious. She takes Tylenol PM and sleeps 5 to 6 hours at night. She has trouble falling asleep and she is restless at night. She noted that her legs are not as restless and she has been drinking electrolytes and water. There are times when she is unable to go back to sleep if she gets out of bed. She will occasionally nap. Her appetite has been fair and her weight fluctuates somewhat. She is fatigued and motivation is low, but her interest level is good and she enjoys activities in which she participates. She denied visual or auditory hallucinations. She denied suicidal ideation or any history of attempts to commit suicide, stating that she enjoys life.    Ms. Cornelius drinks alcohol quite rarely. She was 41 years old before she had her first alcoholic beverage, and she has never been in any chemical dependency treatment programs or Alcoholics Anonymous. She has not had any history of illicit drug use or tobacco use. She will occasionally go to [employer] to sultana, and may spend $20, but she does not regularly s[employer]ana.    Since her last evaluation, Ms. Cornelius has not had any major illnesses or injuries. She had carpal tunnel surgery, and three weeks ago she had an endoscopy because she was choking on her food, and she stated that they stretched her throat. Her balance has been poor since she hurt her back in 1990, but it is getting worse and she worries about it. She loses her balance often and bruises easily. She occasionally runs into walls and has fallen a few times. She has not noticed unilateral weakness or numbness. She rarely experiences headaches anymore, although these used to be a problem for her. She is bothered by muscle aches and pains.    PAST MEDICAL HISTORY: Medical records indicate a history of depression, insomnia, obesity, carpal tunnel syndrome, osteoarthritis, restless leg syndrome, vitamin D deficiency, status post gastric bypass, low back pain.    CURRENT MEDICATIONS:   Include acetaminophen, albuterol, atorvastatin, bupropion, calcium carbonate, cyabnocobalamin, diphenhydramine-acetaminophen, ferrous fumarate, folic acid, gabapentin, Lactobacillus, multiple vitamins, primidone, rosuvastatin, sertraline.    FAMILY MEDICAL HISTORY:  Significant for tremor in both parents when they were older, a father and paternal aunt with dementia in their 70s or 80s, and a mother with cardiovascular disease.    BEHAVIORAL OBSERVATIONS    During the evaluation, Ms. Cornelius was pleasant, cooperative, and seemed to understand the instructions. She was alert and oriented to person, place, and time. No tremors were observed clinically. Mood was euthymic. Speech was fluent, with normal articulation, volume, and rate. Spontaneous conversation was present and entirely appropriate. Internal performance validity measures fell within normal limits. The results are believed to accurately reflect her current level of functioning.    MEASURES ADMINISTERED    The following measures were administered by a trained psychometrist, under the direct supervision of a licensed psychologist.    Subtests of the Wechsler Adult Intelligence Scale-4; subtests of the Wechsler Memory Scale-Revised; [place] Verbal Learning Test-Revised, Form 5; [place] Naming Test; Controlled Oral Word Association Test; Semantic Fluency; Trail Making Test; Stroop; [place] Card Sorting Test - 64; Perkins Judgement of Line Orientation Form H; Clock Drawing; Dementia Rating Scale - 2 (DRS-2) Standard Form; Geriatric Depression Scale (GDS), HAM-D, HAM-A, Apathy Scale, QUEST, ESS.     RESULTS AND INTERPRETATION    Overall intellectual functioning was estimated to fall at the low end of the average range, generally consistent with premorbid estimates based on single word reading abilities administered at a prior evaluation. Performance on a screening measure of dementia was average (DRS-2 Total Score = 136/144).    Confrontation naming was low  average for her age. Verbal abstract reasoning was low average. Ability to comprehend and articulate responses to complex social situations was low average. Letter fluency was moderately impaired, and was characterized by a paucity of responses. Generative naming to category was low average.    Attention span was low average for her age. Divided attention low average. Performance on a measure of distractibility was average. Psychomotor processing speed was moderately slowed.    Basic visual perception, including matching lines and angles, was low average for her age. Construction of a clock fell within normal limits. Nonverbal deductive reasoning was average.    Novel problem-solving, including the ability to generate strategies and solutions, fell within normal limits for her age and level of education.    Immediate recall of verbal narrative material was mildly impaired, with low average recall following a 30 minute delay. On a multiple trial list learning task, immediate recall was low average, with mildly impaired retention (33%) following a 25 minute delay. Recognition memory on this task was average. Immediate recall of visual material was mildly impaired, with low average recall following a 30 minute delay.    On the GDS, a self-report questionnaire, Ms. Cornelius endorsed minimal depressive symptomatology. Specifically, she acknowledged having dropped many of her activities and interests and she finds it hard to get started on new projects. She feels that she has more problems with memory than most, she has trouble concentrating, and her mind is not as clear as it used to be. She endorsed minimal apathy on a questionnaire.    IMPRESSIONS AND RECOMMENDATIONS    Current results indicate relative weaknesses in learning of new information, with good retention of the learned material. Information and psychomotor processing speed are relatively slowed. Performance otherwise falls within normal limits across cognitive  domains. She endorsed minimal depressive symptomatology, apathy, or anxiety.    Compared to her most recent evaluation on 11/28/2017, she has had improvements in language including expressive language and phonemic fluency. She has had some improvements in executive functioning, and some aspects of memory. Psychomotor processing speed is marginally slower. At her baseline evaluation in 2016, intellectual functioning was estimated to fall in the mildly impaired range, marginally below premorbid estimates of low average based on single word reading abilities. At her 2017 evaluation, overall intellectual functioning was estimated to fall in the borderline range, and at this evaluation, estimates fall at the low end of the average range. Review of her performance on the subtests comprising these estimates indicate primarily improvements in aspects of reasoning and basic attention. Performance otherwise remains stable across cognitive domains.     This pattern of performance does not reflect dementia at this time, and in fact is notable in that she has had several improvements in cognitive functioning over time. Learning of new information remains a relative weakness, as it has been since her baseline evaluation in 2016, although she has had some improvements in this domain. There is no indication of any progressive neurodegenerative disease, and she does not appear to be experiencing significant depressive symptomatology, anxiety, or apathy.    Ms. Cornelius s primary complaint is significant fatigue. She feels that she has never been able to gain her strength back since her surgery one year ago, and she does not believe that she mentioned this to her neurologist at her recent visit. She stated that she does not have the  pep  that she used to have, and this does not seem to be a symptom of depression, she is not endorsing any other significant symptoms of depression. She was encouraged to contact her neurologist to discuss  this concern.     In terms of functioning, Ms. Cornelius is not experiencing cognitive difficulties that might interfere with her ability to actively participate in treatment or to manage her instrumental activities of daily living independently. She may benefit from the use of written reminders or checklists. She could be encouraged to carry a notepad on which to record important information, or to use a smart phone for this purpose. Results may serve as an updated baseline of her neurocognitive functioning, and the evaluation may be repeated in the future for comparison should a change in mental status occur.    Judith Ayala, Ph.D., ABPP  Licensed Psychologist, [id number]  Board Certified in Clinical Neuropsychology      Time spent: 65 minutes neurobehavioral status exam including interview, clinical assessment by licensed and board-certified neuropsychologist (CPT 99985). 60 minutes (1 unit) neuropsychological testing evaluation by licensed and board-certified neuropsychologist, including integration of patient data, interpretation of standardized test results and clinical data, clinical decision-making, treatment planning, report, and interactive feedback to the patient, first hour (CPT 33462). 95 minutes (2 units) of neuropsychological testing evaluation by licensed and board-certified neuropsychologist, including integration of patient data, interpretation of standardized test results and clinical data, clinical decision-making, treatment planning, report, and interactive feedback to the patient, subsequent hours (CPT 65228). 30 minutes of neuropsychological test administration and scoring by technician, first 30 minutes (CPT 21115). 170 additional minutes (6 units) neuropsychological test administration and scoring by technician, subsequent 30 minutes (CPT 78106). ICD-10 Diagnoses: G25; F06.8

## 2019-11-05 ASSESSMENT — ENCOUNTER SYMPTOMS
NECK PAIN: 0
WEIGHT LOSS: 0
SEIZURES: 0
SPUTUM PRODUCTION: 0
NECK MASS: 0
JAUNDICE: 0
CHILLS: 0
SWOLLEN GLANDS: 0
BLOOD IN STOOL: 0
ABDOMINAL PAIN: 0
LOSS OF CONSCIOUSNESS: 0
WEAKNESS: 1
TASTE DISTURBANCE: 0
RECTAL PAIN: 0
MUSCLE CRAMPS: 1
BLOATING: 0
STIFFNESS: 1
MYALGIAS: 1
NAUSEA: 0
JOINT SWELLING: 0
DIZZINESS: 1
SINUS PAIN: 0
SORE THROAT: 0
TINGLING: 0
MUSCLE WEAKNESS: 1
WEIGHT GAIN: 0
COUGH: 0
COUGH DISTURBING SLEEP: 0
MEMORY LOSS: 0
BACK PAIN: 1
ALTERED TEMPERATURE REGULATION: 0
POSTURAL DYSPNEA: 0
SINUS CONGESTION: 0
HEARTBURN: 1
TROUBLE SWALLOWING: 1
SPEECH CHANGE: 0
VOMITING: 0
EYE IRRITATION: 1
EYE PAIN: 0
TREMORS: 1
WHEEZING: 0
INCREASED ENERGY: 1
HALLUCINATIONS: 0
PARALYSIS: 0
SMELL DISTURBANCE: 0
DOUBLE VISION: 0
HEMOPTYSIS: 0
ARTHRALGIAS: 1
EYE WATERING: 1
DIARRHEA: 0
FEVER: 0
EYE REDNESS: 0
SNORES LOUDLY: 0
FATIGUE: 1
DYSPNEA ON EXERTION: 1
HEADACHES: 1
NUMBNESS: 0
BRUISES/BLEEDS EASILY: 1
POLYPHAGIA: 0
SHORTNESS OF BREATH: 1
BOWEL INCONTINENCE: 0
DECREASED APPETITE: 1
CONSTIPATION: 1
HOARSE VOICE: 0
NIGHT SWEATS: 0
POLYDIPSIA: 0
DISTURBANCES IN COORDINATION: 0

## 2019-11-06 ENCOUNTER — OFFICE VISIT (OUTPATIENT)
Dept: NEUROLOGY | Facility: CLINIC | Age: 64
End: 2019-11-06
Payer: COMMERCIAL

## 2019-11-06 VITALS
WEIGHT: 184 LBS | HEART RATE: 89 BPM | DIASTOLIC BLOOD PRESSURE: 79 MMHG | SYSTOLIC BLOOD PRESSURE: 126 MMHG | OXYGEN SATURATION: 96 % | BODY MASS INDEX: 29.7 KG/M2

## 2019-11-06 DIAGNOSIS — G25.0 HEREDITARY ESSENTIAL TREMOR: ICD-10-CM

## 2019-11-06 DIAGNOSIS — G25.0 ESSENTIAL TREMOR: Primary | ICD-10-CM

## 2019-11-06 RX ORDER — PRIMIDONE 50 MG/1
250 TABLET ORAL 2 TIMES DAILY
Qty: 40 TABLET | Refills: 0 | Status: SHIPPED | OUTPATIENT
Start: 2019-11-06 | End: 2022-02-14

## 2019-11-06 ASSESSMENT — PATIENT HEALTH QUESTIONNAIRE - PHQ9: SUM OF ALL RESPONSES TO PHQ QUESTIONS 1-9: 7

## 2019-11-06 ASSESSMENT — PAIN SCALES - GENERAL: PAINLEVEL: NO PAIN (0)

## 2019-11-06 NOTE — NURSING NOTE
Chief Complaint   Patient presents with     RECHECK     [facility] RETURN MOVEMENT DISORDER       Elva Urbano, EMT

## 2019-11-06 NOTE — PROGRESS NOTES
"Department of Neurology  Movement Disorders Division     Patient: Hilary Cornelius   MRN: [id number]   : 1955   Date of Visit: 2019     Reason for visit: ET follow up and DBS setting interrogation and programming     History of Present Illness  Ms. Cornelius is a 64 year old R handed female with PMH of Essential Tremor s/p b/l VIM DBS (L VIM 3/14/2017, R VIM 3/23/2018) placement that presents to Neurology Movement clinic for follow up. Patient was last seen on 2019 where patient reported good tremor control with DBS and tremor medications (primidone, gabapentin). No changes were made to patient's DBS settings and she was continued on gabapentin and primidone. Prior to her next clinic appointment, she was instructed to decrease primidone by to one tab bid and we will adjust her DBS settings accordingly to control for tremor.     History obtained from patient and accompanied by her , Adilson. Patient reports good tremor control since her last clinic appointment.  She has not changed the settings to her DBS.  She turns off her DBS every night.  Patient reports feeling very jittery and upon further questioning, she admits to stopping her primidone on .  She misunderstood the instruction to only stop 1 tab of primidone so that she would be on primidone 250 mg 1 tab twice a day.  Instead, she stopped taking the primidone entirely on .  She denies any other symptoms apart from feeling \"jittery\".  She reports that her tremors are much worse as a result of stopping this medication. Denies LOC or seizures or change in vision, confusion. She left her medications at home and does not expect to be home until later tomorrow night.  She still takes gabapentin.    Due to concern for withdrawal from prematurely stopping the primidone, primidone was ordered to be picked up at the [facility] pharmacy and patient took 250 mg.  We waited about 1 hour after she took the medication and proceeded " with the appointment. She reported feeling better (jittery feeling resolved) with some improvement in her tremors after taking primidone.  She was educated to never stop this medication prematurely.    Review of Systems:  Other than that mentioned above, the remainder of 12 systems reviewed were negative.    PMH: unchanged  PSH: unchanged  FH: unchanged  SH: unchanged      Medications:  Current Outpatient Medications   Medication Sig Dispense Refill     acetaminophen (TYLENOL) 325 MG tablet Take 2 tablets (650 mg) by mouth every 4 hours as needed for other (surgical pain) 100 tablet      albuterol (PROAIR HFA, PROVENTIL HFA, VENTOLIN HFA) 108 (90 BASE) MCG/ACT inhaler Inhale 2 puffs into the lungs every 6 hours       atorvastatin (LIPITOR) 10 MG tablet Take 10 mg by mouth daily       buPROPion (WELLBUTRIN XL) 300 MG 24 hr tablet daily  1     calcium carbonate (OS-GISELA 600 MG [place]. CA) 600 MG tablet Take 600 mg by mouth daily       cyabnocobalamin (VITAMIN B-12) 2500 MCG sublingual tablet Place 2,500 mcg under the tongue daily       diphenhydrAMINE-acetaminophen (TYLENOL PM EXTRA STRENGTH)  MG tablet Take 2 tablets by mouth nightly as needed for sleep       ferrous fumarate 65 mg, [place]. FE,-Vitamin C 125 mg (VITRON C)  MG TABS tablet Take 1 tablet by mouth daily       folic acid (FOLVITE) 400 MCG tablet Take 400 mcg by mouth daily       gabapentin (NEURONTIN) 600 MG tablet Take 1 tablet (600 mg) by mouth 2 times daily 180 tablet 1     Homeopathic Products (ZICAM COLD REMEDY) TBDP Take 1 tablet by mouth 2 times daily       Lactobacillus (PROBIOTIC ACIDOPHILUS PO) Take 1 capsule by mouth daily       Multiple vitamin TABS Take 1 tablet by mouth daily       primidone (MYSOLINE) 250 MG tablet 1 in am and 2 in the evening       rosuvastatin (CRESTOR) 10 MG tablet Take 10 mg by mouth daily       sertraline (ZOLOFT) 100 MG tablet Take 0.5 tablets by mouth daily          Movement Disorder-related Medications                    8 am 7 pm   Gabapentin 600 mg 1 1   Primidone 250 mg  1 1   Gabapentin last taken at qam. Primidone last taken on Sunday.     Physical Exam:  The patient's  vitals were not taken for this visit.    Physical Exam:  Gen: alert, active, attentive, appropriately groomed   HEENT: normocephalic, eyes open with no discharge, nares patent, oropharynx clear-no lesions  Chest: normal configuration, chest rise equal b/l, non labored breathing   Extremities: no edema/cyanosis in BUE/BLE, distal pulses intact  Psych: mood stable      Neurologic Exam:  Mental status: Alert and oriented to person, place, time, and situation. Follows commands. Recent and remote memory intact. Attention span and concentration intact. Fund of knowledge intact to current events.   Speech: Fluent, intact comprehension and articulation  CN: visual fields intact in all fields, EOMIB,  no nystagmus, normal saccades, facial sensation intact, facial movement symmetric, hearing grossly intact to conversation, tongue protrudes midline   Motor: Moves all extremities equally against gravity without difficulty, normal axial and appendicular tone  Sensation: intact to light touch throughout   Coordination: grossly intact with FTN, HTS  Gait: able to rise unassisted from a seated position though slightly slowed, slightly decreased arm swing and normal length of gait, no en bloc turns, no ataxia     Tremor evaluation -   Head: No-no, best seen with ambulation   Face: some evident in lower face  Voice: none   Posture (R; L):  proximal tremor in batwing position; very slight with posture  Kinetic (R/L): 1 mm b/l  Lower limb (R; L): none in RLE, very slight in LLE < 0.5 mm  Standing: none  Hand writing: some tremors, legible  Archimedes spiral: tremulous in some areas of spiral that is most evident upon initiation but able to identify spiral, left hand slightly worse than right hand - spirals during exam copied and scanned to chart  Dot approximation (R;  L): 3-5 mm, 3 mm    Procedure: DBS Interrogation, Analysis, & Programming       Lead(s):    Left Right   DBS Target VIM     VIM     DBS Lead Type Abbott Infinity 0.5  Abbott Infinity 0.5    Lead Implant Date 3/14/2017     3/23/2018           IPG(s):   1 2   IPG Infinity 5 (6660)       Infinity 5 (6660)   IPG Implant Date    3/24/2017    4/6/2018   Location    Left chest     Right chest   Battery (V) 2.94 V (2.94 V)    2.98 V (2.98 V)        Full Impedence/Currents Check: No problems found  See scanned report for impedance details.    Initial Settings:    Left Brain Program 1      Contacts  C+, 2B-         Amplitude (mA)  3.25         Pulse Width ( s)  60         Frequency (Hz)  130         Impedance (W)/ Currents (mA) 862 /   /   /   /    Patient Control (mA) 2.0 - 3.5              Right Brain Program 1      Contacts  C+, 10ABC-         Amplitude (mA)  1.0         Pulse Width ( s)  60         Frequency (Hz)  130         Impedance (W)/ Currents (mA) 1000 /   /   /   /    Patient Control (mA) 0.5 - 1.10, step 0.05              Initial program selected:    Program 1    Final Settings:  Left Brain Program 1 Program 2     Contacts  C+, 2B-  C+, 2B-       Amplitude (mA)  3.25  4.05       Pulse Width ( s)  60         Frequency (Hz)  130         Impedance (W)/ Currents (mA)  /   /   /   /    Patient Control (mA)  2 - 4.15, step 0.05             Right Brain Program 1 Program 2     Contacts  C+, 10ABC-  C+, 10ABC-       Amplitude (mA)  1.0  1.6       Pulse Width ( s)  60  60       Frequency (Hz)  130  130       Impedance (W)/ Currents (mA) 1000 /  1000 /   /   /    Patient Control (mA)  1.2 - 2.2, step 0.05             Final Program selected: Program 2    Left VIM  Contacts Amplitude Pulse width Freq Effect Adverse effects    C+, 2B-  3.25  60 130  Spiral 1       C+, 2B-  3.45  60  130 Spiral 1      C+, 2B-  3.65  60  130 Spiral 0.5      C+, 2B-  3.85 60 130 Spiral 0.5, FTC 1.5     C+, 2B- 4.05 60 130      C+, 2B- 4.25 60 130  Spiral and FTC no change  Talking is slurred     C+, 2B- 4.15 60 130 No change  Sx improved    *Final setting: C+, 2B- 4.05 60 130 Spiral 0.5      Right VIM  Contacts Amplitude Pulse width Freq Effect Adverse effects    C+, 10ABC-  1.0  60 130  Spiral 2      C+, 10ABC-  1.10  60 130  Spiral 2      C+, 10ABC-  1.2  60 130  Spiral 0      C+, 10ABC-  1.4   60 130 Spiral 2, FTC 1.5    C+, 10ABC-  1.6  60 130 Spiral 1, FTC 1     C+, 10ABC-  1.8  60 130 Spiral 1 and FTC 1     C+, 10ABC-  2.0  60 130 Spiral/FTC no change  Transient tingling in hand    C+, 10ABC-  2.2  60 130 Spiral 0.5, FTC 0.5 Transient tingling in hand    C+, 10ABC-  2.4   60 130  Tingling hand persistent     * Final setting: C+, 10ABC-  1.6   60 130  Resolved        Impression:  Hilary Cornelius is a 64 year old female with Essential Tremors here for DBS interrogation and programming. She accidentally stopped her primidone completely as she misunderstood to just decrease by one tab prior (thus would be taking one tab bid) to this clinic visit. Patient was prescribed primidone 250 mg which she took during the clinic visit and given enough tabs until she is able to return home. DBS evaluation and programming was performed with focus on improving tremors in both hands by evaluating spirals and finger to chin (FTC). Gait and speech was also intermittently evaluated throughout. Impedances were checked and no issues were found in patient's leads. A new program was created, Program 2, for both sides in which the amplitude was increased until a side effect threshold was observed and the final amplitude was changed to 4.05 mA on the left brain (from 3.45 mA) and 1.6 mA on the right brain (from 1.0 mA). Improvement in tremor was observed on both hands on this setting. Patient ranges were also provided and instructions for changing settings is detailed below. Program 1 remains and no changes were made to settings.     1. Essential Tremor   2. Mild primidone  withdrawal     Plan:   - Patient is on Program 2. She is instructed to stay on this program for one week and observe symptoms.   - Details of programs are above.   - She was asked to watch for delayed side effects like slurred speech or trouble walking.   - If after a week tremors are not controlled, increase setting by 0.1 mA and observe for one week. Continue this until you find a setting that improves your tremor without side effects.  - Call the clinic for questions or concerns.  - For the next appointment, a few days prior, decrease your primidone dose to one tab once a day. Your tremor will return but we will adjust your DBS settings to improve your tremor.  - Never stop primidone prematurely.   - Continue gabapentin 600 mg one tab bid and primidone 250 mg one tab bid. Gabapentin and primidone refilled. Gabapentin unable to e-prescribe, will be faxed and sent.     Will return to our clinic in 2-3 months or sooner as needed.    Joi Ponce, DO  Movement Disorders Fellow      The total time spent with the patient was 155 Minutes, 100 minutes in ON/OFF DBS interrogation and analysis; I spent over 50% of this time spent counseling the patient and/or coordinating care regarding the above concerns/issues.    4:10 pm  6: 45 pm    Answers for HPI/ROS submitted by the patient on 11/5/2019   General Symptoms: Yes  Skin Symptoms: No  HENT Symptoms: Yes  EYE SYMPTOMS: Yes  HEART SYMPTOMS: No  LUNG SYMPTOMS: Yes  INTESTINAL SYMPTOMS: Yes  URINARY SYMPTOMS: No  GYNECOLOGIC SYMPTOMS: No  BREAST SYMPTOMS: No  SKELETAL SYMPTOMS: Yes  BLOOD SYMPTOMS: Yes  NERVOUS SYSTEM SYMPTOMS: Yes  MENTAL HEALTH SYMPTOMS: No  Fever: No  Loss of appetite: Yes  Weight loss: No  Weight gain: No  Fatigue: Yes  Night sweats: No  Chills: No  Increased stress: No  Excessive hunger: No  Excessive thirst: No  Feeling hot or cold when others believe the temperature is normal: No  Loss of height: No  Post-operative complications: No  Surgical site  pain: No  Hallucinations: No  Change in or Loss of Energy: Yes  Hyperactivity: No  Confusion: No  Ear pain: Yes  Ear discharge: No  Hearing loss: No  Tinnitus: No  Nosebleeds: No  Congestion: No  Sinus pain: No  Trouble swallowing: Yes   Voice hoarseness: No  Mouth sores: No  Sore throat: No  Tooth pain: No  Gum tenderness: Yes  Bleeding gums: No  Change in taste: No  Change in sense of smell: No  Dry mouth: No  Hearing aid used: No  Neck lump: No  Eye pain: No  Vision loss: Yes  Dry eyes: No  Watery eyes: Yes  Eye bulging: No  Double vision: No  Flashing of lights: No  Spots: No  Floaters: No  Redness: No  Crossed eyes: No  Tunnel Vision: No  Yellowing of eyes: No  Eye irritation: Yes  Cough: No  Sputum or phlegm: No  Coughing up blood: No  Difficulty breating or shortness of breath: Yes  Snoring: No  Wheezing: No  Difficulty breathing on exertion: Yes  Nighttime Cough: No  Difficulty breathing when lying flat: No  Heart burn or indigestion: Yes  Nausea: No  Vomiting: No  Abdominal pain: No  Bloating: No  Constipation: Yes  Diarrhea: No  Blood in stool: No  Black stools: No  Rectal or Anal pain: No  Fecal incontinence: No  Yellowing of skin or eyes: No  Vomit with blood: No  Change in stools: No  Back pain: Yes  Muscle aches: Yes  Neck pain: No  Swollen joints: No  Joint pain: Yes  Bone pain: Yes  Muscle cramps: Yes  Muscle weakness: Yes  Joint stiffness: Yes  Bone fracture: No  Anemia: No  Swollen glands: No  Easy bleeding or bruising: Yes  Edema or swelling: No  Trouble with coordination: No  Dizziness or trouble with balance: Yes  Fainting or black-out spells: No  Memory loss: No  Headache: Yes  Seizures: No  Speech problems: No  Tingling: No  Tremor: Yes  Weakness: Yes  Difficulty walking: No  Paralysis: No  Numbness: No

## 2019-11-07 ENCOUNTER — DOCUMENTATION ONLY (OUTPATIENT)
Dept: NEUROLOGY | Facility: CLINIC | Age: 64
End: 2019-11-07

## 2019-11-07 RX ORDER — PRIMIDONE 250 MG/1
250 TABLET ORAL 2 TIMES DAILY
Qty: 180 TABLET | Refills: 3 | Status: SHIPPED | OUTPATIENT
Start: 2019-11-07

## 2019-11-07 RX ORDER — GABAPENTIN 600 MG/1
600 TABLET ORAL 2 TIMES DAILY
Qty: 180 TABLET | Refills: 3 | Status: SHIPPED | OUTPATIENT
Start: 2019-11-07

## 2019-11-07 NOTE — PATIENT INSTRUCTIONS
Plan:  - You are on Program 2, new setting. Stay on this program for one week and observe your symptoms.   - Left body: C+, 10ABC- at 1.6 mA, 60 us, 130 Hz, patient  2 - 4.15 mA, step 0.05 mA   - Right body: C+, 2B-, 4.05 mA, 60 us, 130 Hz, patient  1.2 - 2.2 mA, step 0.05 mA  - Watch for delayed side effects like slurred speech or trouble walking.   - If after a week your tremors are not controlled, increase your setting by 0.1 mA and observe for one week.   - Call the clinic for questions or concerns.  - For the next appointment, a few days prior, decrease you primidone dose to one tab once a day. Your tremor will return but we will adjust your DBS settings to improve your tremor.    We will see you back in 2-3 months.

## 2020-03-10 ENCOUNTER — HEALTH MAINTENANCE LETTER (OUTPATIENT)
Age: 65
End: 2020-03-10

## 2020-12-27 ENCOUNTER — HEALTH MAINTENANCE LETTER (OUTPATIENT)
Age: 65
End: 2020-12-27

## 2021-03-06 ENCOUNTER — HEALTH MAINTENANCE LETTER (OUTPATIENT)
Age: 66
End: 2021-03-06

## 2021-05-07 ENCOUNTER — TRANSCRIBE ORDERS (OUTPATIENT)
Dept: OTHER | Age: 66
End: 2021-05-07

## 2021-05-07 DIAGNOSIS — R06.02 SOB (SHORTNESS OF BREATH): Primary | ICD-10-CM

## 2021-08-14 ENCOUNTER — HEALTH MAINTENANCE LETTER (OUTPATIENT)
Age: 66
End: 2021-08-14

## 2021-10-09 ENCOUNTER — HEALTH MAINTENANCE LETTER (OUTPATIENT)
Age: 66
End: 2021-10-09

## 2021-12-01 ENCOUNTER — TELEPHONE (OUTPATIENT)
Dept: NEUROLOGY | Facility: CLINIC | Age: 66
End: 2021-12-01
Payer: MEDICARE

## 2021-12-01 NOTE — TELEPHONE ENCOUNTER
Tried to call the patient twice and the phone rang for several rings, then the line ended.    A [employer] message was sent to the patient.

## 2021-12-01 NOTE — TELEPHONE ENCOUNTER
[facility] Call Center    Phone Message    May a detailed message be left on voicemail: yes     Reason for Call: Other: Hilary's  is scheduled with Dr. Bhatia on 2/14 for a neurology consult. They are driving in from [place] and Hilary would like to have a DBS programing appointment on the same day. She has not been able to make it in since COVID hit.      Please call Hilary to discuss her scheduling options.    Action Taken: Other: Neurology    Travel Screening: Not Applicable

## 2021-12-03 NOTE — TELEPHONE ENCOUNTER
Tried to call the patient. The line rang several times, but no one answered and the line ended on it's own.

## 2022-02-14 ENCOUNTER — OFFICE VISIT (OUTPATIENT)
Dept: NEUROLOGY | Facility: CLINIC | Age: 67
End: 2022-02-14
Payer: COMMERCIAL

## 2022-02-14 VITALS
WEIGHT: 189 LBS | SYSTOLIC BLOOD PRESSURE: 125 MMHG | HEART RATE: 76 BPM | BODY MASS INDEX: 30.51 KG/M2 | OXYGEN SATURATION: 98 % | DIASTOLIC BLOOD PRESSURE: 81 MMHG

## 2022-02-14 DIAGNOSIS — Z96.89 S/P DEEP BRAIN STIMULATOR PLACEMENT: Primary | ICD-10-CM

## 2022-02-14 DIAGNOSIS — G25.0 ESSENTIAL TREMOR: ICD-10-CM

## 2022-02-14 PROCEDURE — 99215 OFFICE O/P EST HI 40 MIN: CPT | Mod: 25 | Performed by: NURSE PRACTITIONER

## 2022-02-14 PROCEDURE — 95984 ALYS BRN NPGT PRGRMG ADDL 15: CPT | Performed by: NURSE PRACTITIONER

## 2022-02-14 PROCEDURE — 95983 ALYS BRN NPGT PRGRMG 15 MIN: CPT | Performed by: NURSE PRACTITIONER

## 2022-02-14 RX ORDER — OMEPRAZOLE 40 MG/1
40 CAPSULE, DELAYED RELEASE ORAL PRN
COMMUNITY
Start: 2022-01-17

## 2022-02-14 RX ORDER — OMEGA-3 FATTY ACIDS/FISH OIL 300-1000MG
200 CAPSULE ORAL EVERY 4 HOURS PRN
COMMUNITY

## 2022-02-14 RX ORDER — METOPROLOL SUCCINATE 25 MG/1
25 TABLET, EXTENDED RELEASE ORAL DAILY
COMMUNITY
Start: 2022-02-10

## 2022-02-14 RX ORDER — DULOXETIN HYDROCHLORIDE 30 MG/1
1 CAPSULE, DELAYED RELEASE ORAL DAILY
COMMUNITY
Start: 2021-08-06

## 2022-02-14 RX ORDER — FUROSEMIDE 20 MG
20 TABLET ORAL DAILY
COMMUNITY
Start: 2021-07-01

## 2022-02-14 NOTE — Clinical Note
2022       RE: Hilary Cornelius  [address] [facility] [address]     Dear Colleague,    Thank you for referring your patient, Hilary Cornelius, to the [facility] NEUROLOGY [facility]. Please see a copy of my visit note below.    ASSESSMENT:      Essential Tremor: Ms. Cornelius is a 67 year old *** handed *** female with a *** year history of *** who ***.    ***:      PLAN:    ***    ***      You may return to our clinic as needed.          MOVEMENT DISORDERS CLINIC         PATIENT: Hilary Cornelius    : 1955    DEO: 2022    REASON FOR VISIT: Follow up for ***    HPI: Ms. Hilary Cornelius is a 67 year old right - handed  female who came to the [facility] neurology clinic accompanied by her  for a follow up visit.  They live in [place]. She was last seen in the clinic on *** by *** for ***.  During that visit, the following were discussed: -      Since the last visit the patient reports that her tremor has slowly worsened and is exacerbated by stress. She has been taking primidone and gabapentin with some benefit and minimal side effects. Reports that if she misses a dose of primidone she can tell the difference. No issues with completing ADLs/feeding self. Has been taking care of  who had a stroke in . Had 1 recent fall while taking out the garbage can without significant head injury or trauma.     She was in ER in December due to chest pain. She asked if heart stops beating, would it be okay to be resuscitated with her bilateral DBS?  She doesn't have a medical alert bracelet, which she looked into before.     She has been taking care of her  who had a stroke in .    She is taking Gabapentin and Primidone as below.  Her PCP is refilling her Rx.            Medications for Tremor *** *** *** *** ***   Gabapentin 600 mg 1 1      Primidone 250 mg  1 1        She reports every  time she turns her DBS on, it goes to the lower setting.     Pt reported that her DBS     ROS: -   Tremor location: ***.  Able to hold utensils to eat: ***.  Elbert to cut food: ***.  Exacerbating factors: ***.  Relieving factors: ***.  Does alcohol relieve symptoms: ***.  Change in handwriting: ***.  Illegible? Micrographia?  Family Hx of tremor: ***.  Resting tremor: ***.  Bradykinesia: ***.  Rigidity: ***.  Gait problem: ***.  Falls: ***.   Dystonia: ***.  Pain: ***.  Numbness: ***.   Tingling: ***.  Other neurological symptoms: ***.    MEDICATIONS:   Outpatient Medications Marked as Taking for the 2/14/22 encounter (Office Visit) with Omari Bonilla APRN CNP   Medication Sig     albuterol (PROAIR HFA, PROVENTIL HFA, VENTOLIN HFA) 108 (90 BASE) MCG/ACT inhaler Inhale 2 puffs into the lungs every 6 hours     buPROPion (WELLBUTRIN XL) 300 MG 24 hr tablet daily     calcium carbonate (OS-GISELA 600 MG [place]. CA) 600 MG tablet Take 600 mg by mouth daily     cyabnocobalamin (VITAMIN B-12) 2500 MCG sublingual tablet Place 2,500 mcg under the tongue daily     DULoxetine (CYMBALTA) 30 MG capsule Take 1 capsule by mouth daily     ferrous fumarate 65 mg, [place]. FE,-Vitamin C 125 mg (VITRON C)  MG TABS tablet Take 1 tablet by mouth daily     folic acid (FOLVITE) 400 MCG tablet Take 400 mcg by mouth daily     furosemide (LASIX) 20 MG tablet Take 20 mg by mouth daily     gabapentin (NEURONTIN) 600 MG tablet Take 1 tablet (600 mg) by mouth 2 times daily     Homeopathic Products (ZICAM COLD REMEDY) TBDP Take 1 tablet by mouth 2 times daily     ibuprofen (ADVIL/MOTRIN) 200 MG capsule Take 200 mg by mouth every 4 hours as needed for fever     Lactobacillus (PROBIOTIC ACIDOPHILUS PO) Take 1 capsule by mouth daily     metoprolol succinate ER (TOPROL-XL) 25 MG 24 hr tablet Take 25 mg by mouth daily     Multiple vitamin TABS Take 1 tablet by mouth daily     omeprazole (PRILOSEC) 40 MG DR capsule Take 40 mg by mouth as needed      primidone (MYSOLINE) 250 MG tablet Take 1 tablet (250 mg) by mouth 2 times daily Take 1 tab in am and 1 tab in the evening     primidone (MYSOLINE) 50 MG tablet Take 5 tablets (250 mg) by mouth 2 times daily     tiZANidine (ZANAFLEX) 4 MG tablet Take 4 mg by mouth every 8 hours as needed       ALLERGIES: Atorvastatin, Bee venom, Asa [aspirin], and Penicillins    DBS Interrogation & Analysis:        Implanted generator:  Bilateral chest Infinity ***  Lead target:  Bilateral Ventral Intermedius Nucleus of Thalamus (VIM).    Program 1    Left Brain  Lead placement date:  3/14/2017  Generator placement date:  3/24/2017    C +, 2B -  Amplitude:  3.40 mA  PW:  60 msec  Rate:  130 Hz    Therapy impedance:  1025 Ohms    Battery Service Life:  2.83 volts.    Patient :  Upper Limit: 3.50 mA  Lower Limit: 2.00 mA    Electrode Impedance Check:    Left Lead: No Problems Found.      Program 1    Right Brain   Lead placement date:  3/23/2018  Generator placement date:  4/6/2018    C +, 10ABC -  Amplitude:  1.10 mA  PW:  60 msec  Rate:  130 Hz    Therapy impedance:  787 Ohms    Battery Service Life:  *** volts.    Patient :  Upper Limit: 1.10 mA  Lower Limit: 0.05 mA    Electrode Impedance Check:    Right Lead: No Problems Found.      See scanned report for impedance details.      PHYSICAL EXAM:    VITAL SIGNS:  Blood pressure 125/81, pulse 76, weight 85.7 kg (189 lb), SpO2 98 %, not currently breastfeeding., Body mass index is 30.51 kg/m .    GENERAL:  Ms. Cornelius is a pleasant *** female who is {General:693081464} sitting comfortably in the exam room without any distress.  Affect is appropriate.    TREMOR ASSESSMENT:   Speech: Slight tremor.   Facial Expression: Normal.  Head tremor:  No  Rest Tremor: Absent.  Postural Tremor: Slight to mild bilaterally.  Wing beating: Slight to mild bilaterally.  Finger to nose:  Mild bilaterally.   Leg Tremor:  No.   Arising from chair - arms folded across chest: {Arising  from chair:6516289}  Posture: {Posture:9193455}  Gait: {Gait:5779133}  Postural Stability: {Postural Stability:9476330}  Body Bradykinesia: {Body Bradykinesia:2457009}    DBS Programming    __  The Right Vim DBS was switched to Program 2.     Left hand tremor im    C +, 10ABC -  Amplitude:  1.6 mA  PW:  60 msec  Rate:  130 Hz    Patient :  Upper Limit: 2.2 mA  Lower Limit: 1.2mA    Therapy impedance:  800 Ohms    __  The Left Vim DBS was switched to Program 2.     C +, 2B -  Amplitude:  4.05 mA  PW:  60 msec  Rate:  130 Hz    Patient :  Upper Limit: 4.15 mA  Lower Limit: 2.00 mA    Therapy impedance:  1050 Ohms    Today I spent 48 minutes caring for the patient. 30 minutes was spent in DBS programming.  18 minutes was spent with reviewing records, meeting with the patient, answering questions, examining, and documentation.    LUDWIG Greenfield, CNP  [facility]    10:14 AM            Again, thank you for allowing me to participate in the care of your patient.      Sincerely,    LUDWIG Larson CNP

## 2022-02-14 NOTE — PATIENT INSTRUCTIONS
Dear Ms. Hilary Cornelius,    Thank you for coming today.  During your visit, we have discussed the following:     __ Your DBS electrical system function (impedance) is normal. The battery life is also good.  __ Your DBS programming was changed to Program 2. Keep it there.      Right body = 4.05 mA    Range 2.0 - 4.15 mA    Left body = 1.6 mA    Range 1.2 - 2.2 mA    __ Contact Dr. Batool Mata - A Movement Disorders Neurologist  [facility]  Address: [address] [place] [id number]  Phone: [phone]    __  Please call if your symptoms worsen or you experience side effects. You can change the programs and the current too.   __  Return to our clinic as needed.     To contact our clinic, you may call [phone] or use [employer] message.     It's good to see you!      LUDWIG Greenfield, CNP  [facility]

## 2022-02-14 NOTE — PROGRESS NOTES
ASSESSMENT:      Essential Tremor:  Improved post bilateral Vim DBS implantation. Gradually tremor has gotten worse.     S/p bilateral Vim DBS lead implantation:  Normal impedance and battery life. DBS programming changes were made today.       PLAN:    All questions addressed. I recommended buying a medical alert bracelet or necklace to indicate that she has DBS.    The following instruction provided: -    __ Your DBS electrical system function (impedance) is normal. The battery life is also good.  __ Your DBS programming was changed to Program 2. Keep it there.      Right body = 4.05 mA    Range 2.0 - 4.15 mA    Left body = 1.6 mA    Range 1.2 - 2.2 mA    __ Contact Dr. Batool Mata - [facility] Neurologist  [facility]  Address: [address] [place] [address]  Phone: [phone]    __  Please call if your symptoms worsen or you experience side effects. You can change the programs and the current too.   __  Return to our clinic as needed.           MOVEMENT DISORDERS CLINIC         PATIENT: Hilary Cornelius    : 1955    DEO: 2022    REASON FOR VISIT: Follow up for Essential Tremor (ET) and deep brain stimulation programming optimization.     HPI: Ms. Hilary Cornelius is a 67 year old right - handed  female who came to the [facility] neurology clinic accompanied by her  for a follow up visit. They live in [place]. She was last seen in the clinic on 2019 by Dr. Ponce for DBS programming. Due to COVID-19 restrictions and her 's stroke in , she didn't return for a follow up.    Since the last visit the patient reports that her tremor has slowly worsened and is exacerbated by stress. She has been taking primidone and gabapentin with some benefit and minimal side effects. Reports that if she misses a dose of primidone she can tell the difference. No issues with completing ADLs/feeding self. Has been taking care of  after his stroke in .  Had 1 recent fall while taking out the garbage can without significant head injury or trauma.     She was in ER in December due to chest pain. She asked if her heart stops beating, would it be okay to be resuscitated using a defibrillator with her bilateral DBS.  She doesn't have a medical alert bracelet, which she looked into before.     She is taking Gabapentin and Primidone as below.  Her PCP is refilling her Rx.     Medications for Tremor AM PM   Gabapentin 600 mg 1 1   Primidone 250 mg  1 1     She reports every time she turns her DBS on, it goes to the lower setting.     Pt reported that she hasn't made any changes in her DBS.    MEDICATIONS:   Outpatient Medications Marked as Taking for the 2/14/22 encounter (Office Visit) with Omari Bonilla APRN CNP   Medication Sig     albuterol (PROAIR HFA, PROVENTIL HFA, VENTOLIN HFA) 108 (90 BASE) MCG/ACT inhaler Inhale 2 puffs into the lungs every 6 hours     buPROPion (WELLBUTRIN XL) 300 MG 24 hr tablet daily     calcium carbonate (OS-GISELA 600 MG [place]. [place]) 600 MG tablet Take 600 mg by mouth daily     cyabnocobalamin (VITAMIN B-12) 2500 MCG sublingual tablet Place 2,500 mcg under the tongue daily     DULoxetine (CYMBALTA) 30 MG capsule Take 1 capsule by mouth daily     ferrous fumarate 65 mg, [place]. FE,-Vitamin C 125 mg (VITRON C)  MG TABS tablet Take 1 tablet by mouth daily     folic acid (FOLVITE) 400 MCG tablet Take 400 mcg by mouth daily     furosemide (LASIX) 20 MG tablet Take 20 mg by mouth daily     gabapentin (NEURONTIN) 600 MG tablet Take 1 tablet (600 mg) by mouth 2 times daily     Homeopathic Products (ZICAM COLD REMEDY) TBDP Take 1 tablet by mouth 2 times daily     ibuprofen (ADVIL/MOTRIN) 200 MG capsule Take 200 mg by mouth every 4 hours as needed for fever     Lactobacillus (PROBIOTIC ACIDOPHILUS PO) Take 1 capsule by mouth daily     metoprolol succinate ER (TOPROL-XL) 25 MG 24 hr tablet Take 25 mg by mouth daily     Multiple vitamin TABS Take 1  tablet by mouth daily     omeprazole (PRILOSEC) 40 MG DR capsule Take 40 mg by mouth as needed     primidone (MYSOLINE) 250 MG tablet Take 1 tablet (250 mg) by mouth 2 times daily Take 1 tab in am and 1 tab in the evening     tiZANidine (ZANAFLEX) 4 MG tablet Take 4 mg by mouth every 8 hours as needed     DBS Interrogation & Analysis:    Implanted generator:  Bilateral chest Infinity [id number]  Lead target:  Bilateral Ventral Intermedius Nucleus of Thalamus (VIM).    Program 1    Left Brain  Lead placement date:  3/14/2017  Generator placement date:  3/24/2017    C +, 2B -  Amplitude:  3.40 mA  PW:  60 msec  Rate:  130 Hz    Therapy impedance:  1025 Ohms    Battery Service Life:  2.83 volts    Patient :  Upper Limit: 3.50 mA  Lower Limit: 2.00 mA    Electrode Impedance Check:    Left Lead: No Problems Found.      Program 1    Right Brain   Lead placement date:  3/23/2018  Generator placement date:  4/6/2018    C +, 10ABC -  Amplitude:  1.10 mA  PW:  60 msec  Rate:  130 Hz    Therapy impedance:  787 Ohms    Battery Service Life:  2.96 volts.    Patient :  Upper Limit: 1.10 mA  Lower Limit: 0.05 mA    Electrode Impedance Check:    Right Lead: No Problems Found.      See scanned report for impedance details.      PHYSICAL EXAM:    VITAL SIGNS:  Blood pressure 125/81, pulse 76, weight 85.7 kg (189 lb), SpO2 98 %, not currently breastfeeding., Body mass index is 30.51 kg/m .    GENERAL:  Ms. Cornelius is a pleasant  female who is well-groomed and well-developed sitting comfortably in the exam room without any distress.  Affect is appropriate.    TREMOR ASSESSMENT:   Speech: Slight tremor.   Facial Expression: Normal.  Head tremor:  No  Rest Tremor: Absent.  Postural Tremor: Slight to mild bilaterally.  Wing beating: Slight to mild bilaterally.  Finger to nose:  Mild bilaterally.   Leg Tremor:  No.       DBS Programming    __  The Right Vim DBS was switched to Program 2.     Left hand tremor both  postural and finger to nose improved.     C +, 10ABC -  Amplitude:  1.6 mA  PW:  60 msec  Rate:  130 Hz    Patient :  Upper Limit: 2.2 mA  Lower Limit: 1.2 mA    Therapy impedance:  800 Ohms    __  The Left Vim DBS was switched to Program 2.     C +, 2B -  Amplitude:  4.05 mA  PW:  60 msec  Rate:  130 Hz    Patient :  Upper Limit: 4.15 mA  Lower Limit: 2.00 mA    Therapy impedance:  1050 Ohms    The right hand tremor both postural and finger to nose improved.     Today I spent 48 minutes caring for the patient. 30 minutes was spent in DBS programming.  18 minutes was spent with reviewing records, meeting with the patient, answering questions, examining, and documentation.    LUDWIG Greenfield, CNP  [facility]

## 2022-03-20 ENCOUNTER — HEALTH MAINTENANCE LETTER (OUTPATIENT)
Age: 67
End: 2022-03-20

## 2022-09-11 ENCOUNTER — HEALTH MAINTENANCE LETTER (OUTPATIENT)
Age: 67
End: 2022-09-11

## 2022-09-20 ENCOUNTER — TELEPHONE (OUTPATIENT)
Dept: NEUROSURGERY | Facility: CLINIC | Age: 67
End: 2022-09-20

## 2022-09-20 NOTE — TELEPHONE ENCOUNTER
Writer ROBERT for pt to call back and schedule a consult with Dr. Javier    Please schedule a new, in person or video visit, with Dr. Javier for next available    Gloria Hightower

## 2022-11-02 NOTE — TELEPHONE ENCOUNTER
11/2/2022-Request for images faxed to [facility]-MR @ 910am    11/7/2022-2nd request for images faxed to [facility]-MR @ 630am      FUTURE VISIT INFORMATION      FUTURE VISIT INFORMATION:    Date: 11/7/2022    Time: 1115am    [place] [facility] [place]ERRAL INFORMATION:    Referring provider:  Dr. Holly     Referring providers clinic:      Reason for visit/diagnosis DBS Consult     RECORDS REQUESTED FROM:       Clinic name Comments Records Status Imaging Status   Internal CT Head-11/10/2016, 3/14/2017, 3/15/2017, 4/19/2017, 3/23/2018, 3/24/2018, 5/2/2018    MR Brain-11/10/2016 Epic PACS         [facility] Everywhere Requested

## 2022-11-07 ENCOUNTER — OFFICE VISIT (OUTPATIENT)
Dept: NEUROSURGERY | Facility: CLINIC | Age: 67
End: 2022-11-07
Attending: STUDENT IN AN ORGANIZED HEALTH CARE EDUCATION/TRAINING PROGRAM
Payer: COMMERCIAL

## 2022-11-07 ENCOUNTER — PRE VISIT (OUTPATIENT)
Dept: NEUROSURGERY | Facility: CLINIC | Age: 67
End: 2022-11-07

## 2022-11-07 ENCOUNTER — LAB (OUTPATIENT)
Dept: LAB | Facility: CLINIC | Age: 67
End: 2022-11-07
Payer: COMMERCIAL

## 2022-11-07 VITALS
RESPIRATION RATE: 16 BRPM | DIASTOLIC BLOOD PRESSURE: 78 MMHG | HEART RATE: 85 BPM | SYSTOLIC BLOOD PRESSURE: 121 MMHG | OXYGEN SATURATION: 98 %

## 2022-11-07 DIAGNOSIS — R79.1 ABNORMAL COAGULATION PROFILE: ICD-10-CM

## 2022-11-07 DIAGNOSIS — Z01.818 PREOPERATIVE EVALUATION TO RULE OUT SURGICAL CONTRAINDICATION: ICD-10-CM

## 2022-11-07 DIAGNOSIS — Z45.42 END OF BATTERY LIFE OF DEEP BRAIN STIMULATOR: ICD-10-CM

## 2022-11-07 DIAGNOSIS — Z96.89 S/P DEEP BRAIN STIMULATOR PLACEMENT: ICD-10-CM

## 2022-11-07 DIAGNOSIS — Z01.818 PREOPERATIVE EVALUATION TO RULE OUT SURGICAL CONTRAINDICATION: Primary | ICD-10-CM

## 2022-11-07 DIAGNOSIS — G25.0 ESSENTIAL TREMOR: Primary | ICD-10-CM

## 2022-11-07 LAB
ALBUMIN UR-MCNC: NEGATIVE MG/DL
ANION GAP SERPL CALCULATED.3IONS-SCNC: 11 MMOL/L (ref 7–15)
APPEARANCE UR: CLEAR
APTT PPP: 25 SECONDS (ref 22–38)
BILIRUB UR QL STRIP: NEGATIVE
BUN SERPL-MCNC: 15.9 MG/DL (ref 8–23)
CALCIUM SERPL-MCNC: 9.3 MG/DL (ref 8.8–10.2)
CHLORIDE SERPL-SCNC: 102 MMOL/L (ref 98–107)
COLOR UR AUTO: YELLOW
CREAT SERPL-MCNC: 0.57 MG/DL (ref 0.51–0.95)
DEPRECATED HCO3 PLAS-SCNC: 27 MMOL/L (ref 22–29)
ERYTHROCYTE [DISTWIDTH] IN BLOOD BY AUTOMATED COUNT: 12.4 % (ref 10–15)
GFR SERPL CREATININE-BSD FRML MDRD: >90 ML/MIN/1.73M2
GLUCOSE SERPL-MCNC: 173 MG/DL (ref 70–99)
GLUCOSE UR STRIP-MCNC: NEGATIVE MG/DL
HCT VFR BLD AUTO: 38.2 % (ref 35–47)
HGB BLD-MCNC: 13.4 G/DL (ref 11.7–15.7)
HGB UR QL STRIP: NEGATIVE
INR PPP: 1.02 (ref 0.85–1.15)
KETONES UR STRIP-MCNC: NEGATIVE MG/DL
LEUKOCYTE ESTERASE UR QL STRIP: ABNORMAL
MCH RBC QN AUTO: 30.7 PG (ref 26.5–33)
MCHC RBC AUTO-ENTMCNC: 35.1 G/DL (ref 31.5–36.5)
MCV RBC AUTO: 88 FL (ref 78–100)
MUCOUS THREADS #/AREA URNS LPF: PRESENT /LPF
NITRATE UR QL: NEGATIVE
PH UR STRIP: 5 [PH] (ref 5–7)
PLATELET # BLD AUTO: 227 10E3/UL (ref 150–450)
POTASSIUM SERPL-SCNC: 4.1 MMOL/L (ref 3.4–5.3)
RBC # BLD AUTO: 4.36 10E6/UL (ref 3.8–5.2)
RBC URINE: 0 /HPF
SODIUM SERPL-SCNC: 140 MMOL/L (ref 136–145)
SP GR UR STRIP: 1.03 (ref 1–1.03)
SQUAMOUS EPITHELIAL: 1 /HPF
UROBILINOGEN UR STRIP-MCNC: NORMAL MG/DL
WBC # BLD AUTO: 5.3 10E3/UL (ref 4–11)
WBC URINE: 1 /HPF

## 2022-11-07 PROCEDURE — 81001 URINALYSIS AUTO W/SCOPE: CPT | Performed by: PATHOLOGY

## 2022-11-07 PROCEDURE — 85027 COMPLETE CBC AUTOMATED: CPT | Performed by: PATHOLOGY

## 2022-11-07 PROCEDURE — 85730 THROMBOPLASTIN TIME PARTIAL: CPT | Performed by: PATHOLOGY

## 2022-11-07 PROCEDURE — 80048 BASIC METABOLIC PNL TOTAL CA: CPT | Performed by: PATHOLOGY

## 2022-11-07 PROCEDURE — 85610 PROTHROMBIN TIME: CPT | Performed by: PATHOLOGY

## 2022-11-07 PROCEDURE — 99204 OFFICE O/P NEW MOD 45 MIN: CPT | Performed by: NEUROLOGICAL SURGERY

## 2022-11-07 PROCEDURE — 36415 COLL VENOUS BLD VENIPUNCTURE: CPT | Performed by: PATHOLOGY

## 2022-11-07 RX ORDER — ACYCLOVIR 400 MG/1
400 TABLET ORAL
COMMUNITY
Start: 2022-03-17

## 2022-11-07 RX ORDER — BUSPIRONE HYDROCHLORIDE 10 MG/1
TABLET ORAL
COMMUNITY
Start: 2022-09-22

## 2022-11-07 RX ORDER — LORATADINE 10 MG/1
10 TABLET ORAL
COMMUNITY
Start: 2022-03-23

## 2022-11-07 ASSESSMENT — PAIN SCALES - GENERAL: PAINLEVEL: MODERATE PAIN (4)

## 2022-11-07 NOTE — PROGRESS NOTES
NEUROSURGERY    HISTORY AND PHYSICAL EXAM    Chief Complaint   Patient presents with     Consult     DEPLETED DBS GENERATOR/BATTERY OVER THE LEFT CHEST WALL       HISTORY OF PRESENT ILLNESS  Ms. Hilary Cornelius is seen in the neurosurgery clinic for her depleted DBS generator/battery over the LEFT chest wall.  The patient has a diagnosis of essential tremor and she is s/p left side deep brain stimulator placement, phase I, placement of left side deep brain stimulator electrode, target left ventral intermediate nucleus of the thalamus with microelectrode recording on 3/14/2017 and s/p deep brain stimulator placement, phase II, placement of deep brain stimulator generator/battery over the left chest wall on 3/24/2017.  Subsequently, she wanted her second side implanted so she underwent right side deep brain stimulator placement, phase I, placement of right side deep brain stimulator electrode, target right ventral intermediate nucleus of the thalamus with microelectrode recording on 3/23/2018 and s/p deep brain stimulator placement, phase II, placement of deep brain stimulator generator/battery over the right chest wall on 4/6/2018.    Patient states that she is doing well and she is getting great benefit from the DBS.  However, her RIGHT body DBS system, implanted on the LEFT side, is giving her a replacement warning when her controller is connected to the generator/battery.  The LEFT side generator/battery was noted to be at 2.83 Volts on 2/14/2022 but it is now in need of replacement.  It has lasted her 5 years and 7 months and it is set at 3.4 Volts for stimulation.    Of note, her LEFT body DBS system, implanted on the RIGHT side, is at 2.96 Volts.  It has lasted her so far 4 years and 6 months and it is set at 1.1 Volts for stimulation.  It seems that this generator/battery still has plenty of power remaining.    Patient states that she is in her usual state of health and she denies any new illness.  She is not  on any anticoagulant or antiplatelet therapy.  She stopped taking aspirin as well.  Patient denies any abnormal shocks and she denies any issues with the current location of the generator/battery.      Past Medical History:   Diagnosis Date     Chronic pain 11/8/2016     Cognitive disorder 12/4/2016 2016 evaluation   IMPRESSIONS AND RECOMMENDATIONS  Current results indicate overall intellectual functioning estimated fall in the mildly impaired range, marginally below premorbid estimates of low average based on single word reading abilities. She demonstrates a relative inefficiency in learning of new material, but retains information quite well once she has learned it. Visual-spatial abilities     Exercise-induced asthma      Gastroesophageal reflux disease 3/30/2015    XR ESOPHAGRAM7/8/2016  [facility]  Result Narrative  This document is currently in Final Status  Exam Accession# [id number]  XR ESOPHAGRAM  CLINICAL INFORMATION: Dysphagia;   COMPARISON: None.  FINDINGS: The esophagus is normal in course and caliber in this post gastric bypass patient. No intrinsic or extrinsic mass lesions are identified. No strictures. There is a small hiatal hernia. Gastroesophageal reflux is observed during this examination  with numerous tertiary contractions and delayed esophageal clearance.  IMPRESSION:  1. Gastroesophageal reflux with numerous tertiary contractions and delayed esophageal clearance. 2. Sliding hiatal hernia in this post gastric bypass patient.  Dictated By: Dandre Wyatt MD 7/8/2016 9:47 AM Edited By: MIKE 7/8/2016 10:02 AM  Electronically Signed: Dandre Wyatt MD 7/8/2016 4:24 PM   Status Results Details         H/O bariatric surgery 11/08/2016     Headache disorder 11/05/2016     Heart murmur 11/8/2016     Rheumatic fever      T2DM (type 2 diabetes mellitus) (H)     Under control s/p gastric bypass     Tremor 11/5/2016       Past Surgical History:   Procedure Laterality Date     APPENDECTOMY        CHOLECYSTECTOMY       COLONOSCOPY       ENT SURGERY       GASTRIC BYPASS  2005     IMPLANT DEEP BRAIN STIMULATION GENERATOR / BATTERY Left 3/24/2017    Procedure: IMPLANT DEEP BRAIN STIMULATION GENERATOR / BATTERY;  Surgeon: Henok Javier MD;  Location: [facility] OR     IMPLANT DEEP BRAIN STIMULATION GENERATOR / BATTERY Right 4/6/2018    Procedure: IMPLANT DEEP BRAIN STIMULATION GENERATOR / BATTERY;  Deep Brain Stimulator Placement, Phase II, Placement Of Deep Brain Stimulator Generator/Battery Over The Right Chest Wall;  Surgeon: Henok Javier MD;  Location: [facility] OR     OPTICAL TRACKING SYSTEM INSERTION DEEP BRAIN STIMULATION Left 3/14/2017    Procedure: OPTICAL TRACKING SYSTEM INSERTION DEEP BRAIN STIMULATION;  Surgeon: Henok Javier MD;  Location: [facility] OR     OPTICAL TRACKING SYSTEM INSERTION DEEP BRAIN STIMULATION Right 3/23/2018    Procedure: OPTICAL TRACKING SYSTEM INSERTION DEEP BRAIN STIMULATION;  Stealth Assisted Right Deep Brain Stimulator Placement, Phase I, Placement Right Side Deep Brain Stimulator Electrode, Target Right Ventral Intermediate Nucleus Of The Thalamus With Microelectrode Recording;  Surgeon: Henok Javier MD;  Location: [facility] OR     ORTHOPEDIC SURGERY       thoracic spine surgery       TUBAL LIGATION         Family History   Problem Relation Age of Onset     Tremor Mother      Cardiomyopathy Mother         had surgery at [place]     Heart Disease Mother      Dementia Father         Alzheimer's disease     Parkinsonism Other         paternal aunt's son - cousin     Tremor Daughter      Cancer Brother         Lung       Social History     Socioeconomic History     Marital status:      Spouse name: Not on file     Number of children: Not on file     Years of education: Not on file     Highest education level: Not on file   Occupational History     Not on file   Tobacco Use     Smoking status: Never     Smokeless tobacco: Never   Substance and Sexual Activity      Alcohol use: Not Currently     Comment: social     Drug use: No     Sexual activity: Yes     Partners: Male   Other Topics Concern     Parent/sibling w/ CABG, MI or angioplasty before 65F 55M? No   Social History Narrative    from [facility].  to Adilson MONTES HISTORY: The patient was born in [place], [place]. The patient was educated formally through 12 years. The patient is presently disabled due to her spinal difficulties. The patient has been  40 years. The patient has 3 children of that marriage. The patient does not use tobacco. The patient has social use of alcohol. The patient has no history of drug or intravenous drug use or abuse.         FAMILY HISTORY: A strong family history of tremor with mother having tremor, but also a strong paternal family history of tremor with father with multiple cousins. Patient does have one cousin with Parkinson disease. The patients father suffered with dementia of the Alzheimer type.     2 Daughters and son    Daughter lives 20 miles away in [place] from her her    Daughter lives in [place]    Son lives in [place]        St[facility] at the [facility]     Financial Resource Strain: Not on file   Food Insecurity: Not on file   Transportation Needs: Not on file   Physical Activity: Not on file   Stress: Not on file   Social Connections: Not on file   Intimate Partner Violence: Not on file   Housing Stability: Not on file          Allergies   Allergen Reactions     Atorvastatin Muscle Pain (Myalgia)     Bee Venom Swelling     Asa [Aspirin] Unknown, Other (See Comments) and Rash     Other reaction(s): Other (Specify in Comments)  Aspirin 325mg. No prior symptoms with 81mg  Heavy bleeding     Penicillins Rash     rash  rash  rash       Current Outpatient Medications   Medication     acyclovir (ZOVIRAX) 400 MG tablet     albuterol (PROAIR HFA, PROVENTIL HFA, VENTOLIN HFA) 108 (90  BASE) MCG/ACT inhaler     buPROPion (WELLBUTRIN XL) 300 MG 24 hr tablet     busPIRone (BUSPAR) 10 MG tablet     calcium carbonate (OS-GISELA 600 MG [place]. [place]) 600 MG tablet     cyabnocobalamin (VITAMIN B-12) 2500 MCG sublingual tablet     DULoxetine (CYMBALTA) 30 MG capsule     ferrous fumarate 65 mg, [place]. FE,-Vitamin C 125 mg (VITRON C)  MG TABS tablet     folic acid (FOLVITE) 400 MCG tablet     furosemide (LASIX) 20 MG tablet     gabapentin (NEURONTIN) 600 MG tablet     Homeopathic Products (ZICAM COLD REMEDY) TBDP     ibuprofen (ADVIL/MOTRIN) 200 MG capsule     Lactobacillus (PROBIOTIC ACIDOPHILUS PO)     loratadine (CLARITIN) 10 MG tablet     metoprolol succinate ER (TOPROL-XL) 25 MG 24 hr tablet     Multiple vitamin TABS     omeprazole (PRILOSEC) 40 MG DR capsule     primidone (MYSOLINE) 250 MG tablet     sertraline (ZOLOFT) 100 MG tablet     tiZANidine (ZANAFLEX) 4 MG tablet     acetaminophen (TYLENOL) 325 MG tablet     atorvastatin (LIPITOR) 10 MG tablet     diphenhydrAMINE-acetaminophen (TYLENOL PM)  MG tablet     rosuvastatin (CRESTOR) 10 MG tablet     No current facility-administered medications for this visit.       REVIEW OF SYSTEMS  Answers for HPI/ROS submitted by the patient on 11/4/2022  General Symptoms: No  Skin Symptoms: No  HENT Symptoms: No  EYE SYMPTOMS: No  HEART SYMPTOMS: No  LUNG SYMPTOMS: No  INTESTINAL SYMPTOMS: No  URINARY SYMPTOMS: No  GYNECOLOGIC SYMPTOMS: No  BREAST SYMPTOMS: No  SKELETAL SYMPTOMS: No  BLOOD SYMPTOMS: No  NERVOUS SYSTEM SYMPTOMS: No - history of essential tremor  MENTAL HEALTH SYMPTOMS: No      PHYSICAL EXAM  /78   Pulse 85   Resp 16   SpO2 98%       General: Awake, alert, oriented. Well nourished, well developed, she is not in any acute distress.  HEENT: Head normocephalic, atraumatic. No carotid bruit. Neck supple. Good range of motion. No palpable thyroid mass.  Heart: Regular rhythm and rate. No murmurs.  Lungs: Clear to auscultation and  percussion bilaterally. No ronchi, rales or wheeze.  Abdomen: Soft, non-tender, non-distended. No hepatosplenomegaly.  Extremity: Warm with no clubbing or cyanosis. No lower extremity edema.  Incisions: bilateral frontal incisions, bilateral parietal incisions and bilateral chest wall incisions are all well healed.  No exposed hardware.  No wound breakdown.    Neurological:  Awake, alert and oriented to date, time, place and person. Speech fluent.   Pupils equal, round, reactive to light.  Extraocular movement intact.  Visual fields are full on confrontation.  Hearing is grossly normal to finger rub.   Facial sensation intact.  Face symmetric.  Tongue midline.  Uvula elevates equally.    Motor: full strength throughout.  Sensation: intact to light touch and pinpoint.  Deep tendon reflexes: 1+ throughout. Negative for clonus. Negative for Garcia's sign. No dysmetria.      ASSESSMENT   67 year old female  Essential tremor  Status post left side deep brain stimulator placement, phase I, placement of left side deep brain stimulator electrode, target left ventral intermediate nucleus of the thalamus with microelectrode recording on 3/14/2017  Status post deep brain stimulator placement, phase II, placement of deep brain stimulator generator/battery over the left chest wall on 3/24/2017  Status post right side deep brain stimulator placement, phase I, placement of right side deep brain stimulator electrode, target right ventral intermediate nucleus of the thalamus with microelectrode recording on 3/23/2018  Status post deep brain stimulator placement, phase II, placement of deep brain stimulator generator/battery over the right chest wall on 4/6/2018.  Depleted DBS battery/generator over the left chest wall.    Ms. Hilary Cornelius presents with depleted DBS generator/battery over the LEFT chest wall which controls the RIGHT side of the body.  This device is the older of the two side system.  It is also set at a higher  voltage stimulation.  Therefore, replacement is needed now.    This device on the LEFT side has lasted her 5 years and 7 months.  The generator/battery over the RIGHT chest wall has lasted so far 4 years and 6 months and still has power left.  Given the current setting, I do not think that the generator/battery over the RIGHT chest wall needs to be replaced any time soon.  Therefore, only the LEFT side generator/battery needs to be replaced at this time.    We did briefly discuss the option of a rechargeable system but I do not think that it is warranted.  I did talk to her about the remote programming capabilities and we will need to enroll her in the program.    During today's visit, we discussed the surgical procedure for replacing the DBS generator/battery over the LEFT chest wall.  She currently has the Abbott Infinity 5 generator/battery, and this will be maintained during the upcoming procedure. Risks, benefits, alternative therapies were discussed with the patient, including but not limited to infection and bleeding. This surgical procedure will be performed under MAC with local anesthetic. The thinned part of the wound/pocket may be corrected with mobilization of the tissue under the incision. The pocket may need to be enlarged to minimize the stress on the closure.  Surgical procedure was discussed in detail. All questions were answered, and she expressed understanding    Complete history and physical exam has been performed.      PLAN  1. She will undergo replacement of the DBS generator/battery over the LEFT chest wall under MAC with local anesthetic.   2.  Preop labs today.  3.  Negative Covid test within 4 days of the surgery.  4.  History and physical exam has been completed.  5.  Surgery case request has been entered.      30 minutes were spent face to face with the patient of which more than 50% of the time was spent counseling and discussing the above issues regarding diagnosis, differential,  treatment options, and steps for further evaluation.  5 minutes were spent reviewing patient's chart.  15 minutes were spent on documentation for this encounter.  50 minutes total were spent on this encounter today.

## 2022-11-07 NOTE — LETTER
11/7/2022       RE: Hilary Cornelius  [address] [place] [id number]     Dear Colleague,    Thank you for referring your patient, Hilary Cornelius, to the [facility] NEUROSURGERY CLINIC [place] at [facility]. Please see a copy of my visit note below.    NEUROSURGERY    HISTORY AND PHYSICAL EXAM    Chief Complaint   Patient presents with     Consult     DEPLETED DBS GENERATOR/BATTERY OVER THE LEFT CHEST WALL       HISTORY OF PRESENT ILLNESS  Ms. Hilary Cornelius is seen in the neurosurgery clinic for her depleted DBS generator/battery over the LEFT chest wall.  The patient has a diagnosis of essential tremor and she is s/p left side deep brain stimulator placement, phase I, placement of left side deep brain stimulator electrode, target left ventral intermediate nucleus of the thalamus with microelectrode recording on 3/14/2017 and s/p deep brain stimulator placement, phase II, placement of deep brain stimulator generator/battery over the left chest wall on 3/24/2017.  Subsequently, she wanted her second side implanted so she underwent right side deep brain stimulator placement, phase I, placement of right side deep brain stimulator electrode, target right ventral intermediate nucleus of the thalamus with microelectrode recording on 3/23/2018 and s/p deep brain stimulator placement, phase II, placement of deep brain stimulator generator/battery over the right chest wall on 4/6/2018.    Patient states that she is doing well and she is getting great benefit from the DBS.  However, her RIGHT body DBS system, implanted on the LEFT side, is giving her a replacement warning when her controller is connected to the generator/battery.  The LEFT side generator/battery was noted to be at 2.83 Volts on 2/14/2022 but it is now in need of replacement.  It has lasted her 5 years and 7 months and it is set at 3.4 Volts for stimulation.    Of note, her LEFT body DBS  system, implanted on the RIGHT side, is at 2.96 Volts.  It has lasted her so far 4 years and 6 months and it is set at 1.1 Volts for stimulation.  It seems that this generator/battery still has plenty of power remaining.    Patient states that she is in her usual state of health and she denies any new illness.  She is not on any anticoagulant or antiplatelet therapy.  She stopped taking aspirin as well.  Patient denies any abnormal shocks and she denies any issues with the current location of the generator/battery.      Past Medical History:   Diagnosis Date     Chronic pain 11/8/2016     Cognitive disorder 12/4/2016 2016 evaluation   IMPRESSIONS AND RECOMMENDATIONS  Current results indicate overall intellectual functioning estimated fall in the mildly impaired range, marginally below premorbid estimates of low average based on single word reading abilities. She demonstrates a relative inefficiency in learning of new material, but retains information quite well once she has learned it. Visual-spatial abilities     Exercise-induced asthma      Gastroesophageal reflux disease 3/30/2015    XR ESOPHAGRAM7/8/2016  [facility]  Result Narrative  This document is currently in Final Status  Exam Accession# [id number]  XR ESOPHAGRAM  CLINICAL INFORMATION: Dysphagia;   COMPARISON: None.  FINDINGS: The esophagus is normal in course and caliber in this post gastric bypass patient. No intrinsic or extrinsic mass lesions are identified. No strictures. There is a small hiatal hernia. Gastroesophageal reflux is observed during this examination  with numerous tertiary contractions and delayed esophageal clearance.  IMPRESSION:  1. Gastroesophageal reflux with numerous tertiary contractions and delayed esophageal clearance. 2. Sliding hiatal hernia in this post gastric bypass patient.  Dictated By: Dandre Wyatt MD 7/8/2016 9:47 AM Edited By: MIKE 7/8/2016 10:02 AM  Electronically Signed: Dandre Wyatt MD 7/8/2016 4:24 PM   Status  Results Details         H/O bariatric surgery 11/08/2016     Headache disorder 11/05/2016     Heart murmur 11/8/2016     Rheumatic fever      T2DM (type 2 diabetes mellitus) (H)     Under control s/p gastric bypass     Tremor 11/5/2016       Past Surgical History:   Procedure Laterality Date     APPENDECTOMY       CHOLECYSTECTOMY       COLONOSCOPY       ENT SURGERY       GASTRIC BYPASS  2005     IMPLANT DEEP BRAIN STIMULATION GENERATOR / BATTERY Left 3/24/2017    Procedure: IMPLANT DEEP BRAIN STIMULATION GENERATOR / BATTERY;  Surgeon: Henok Javier MD;  Location: [facility] OR     IMPLANT DEEP BRAIN STIMULATION GENERATOR / BATTERY Right 4/6/2018    Procedure: IMPLANT DEEP BRAIN STIMULATION GENERATOR / BATTERY;  Deep Brain Stimulator Placement, Phase II, Placement Of Deep Brain Stimulator Generator/Battery Over The Right Chest Wall;  Surgeon: Henok Javier MD;  Location: [facility] OR     OPTICAL TRACKING SYSTEM INSERTION DEEP BRAIN STIMULATION Left 3/14/2017    Procedure: OPTICAL TRACKING SYSTEM INSERTION DEEP BRAIN STIMULATION;  Surgeon: Henok Javier MD;  Location: [facility] OR     OPTICAL TRACKING SYSTEM INSERTION DEEP BRAIN STIMULATION Right 3/23/2018    Procedure: OPTICAL TRACKING SYSTEM INSERTION DEEP BRAIN STIMULATION;  Stealth Assisted Right Deep Brain Stimulator Placement, Phase I, Placement Right Side Deep Brain Stimulator Electrode, Target Right Ventral Intermediate Nucleus Of The Thalamus With Microelectrode Recording;  Surgeon: Henok Jaiver MD;  Location: [facility] OR     ORTHOPEDIC SURGERY       thoracic spine surgery       TUBAL LIGATION         Family History   Problem Relation Age of Onset     Tremor Mother      Cardiomyopathy Mother         had surgery at [place]     Heart Disease Mother      Dementia Father         Alzheimer's disease     Parkinsonism Other         paternal aunt's son - cousin     Tremor Daughter      Cancer Brother         Lung       Social History     Socioeconomic  History     Marital status:      Spouse name: Not on file     Number of children: Not on file     Years of education: Not on file     Highest education level: Not on file   Occupational History     Not on file   Tobacco Use     Smoking status: Never     Smokeless tobacco: Never   Substance and Sexual Activity     Alcohol use: Not Currently     Comment: social     Drug use: No     Sexual activity: Yes     Partners: Male   Other Topics Concern     Parent/sibling w/ CABG, MI or angioplasty before 65F 55M? No   Social History Narrative    from [facility] [place].  to [employer] HISTORY: The patient was born in [place], [place]. The patient was educated formally through 12 years. The patient is presently disabled due to her spinal difficulties. The patient has been  40 years. The patient has 3 children of that marriage. The patient does not use tobacco. The patient has social use of alcohol. The patient has no history of drug or intravenous drug use or abuse.         FAMILY HISTORY: A strong family history of tremor with mother having tremor, but also a strong paternal family history of tremor with father with multiple cousins. Patient does have one cousin with Parkinson disease. The patients father suffered with dementia of the Alzheimer type.     2 Daughters and son    Daughter lives 20 miles away in [place] from her her    Daughter lives in [place]    Son lives in [place]        St[facility] the [facility]     Financial Resource Strain: Not on file   Food Insecurity: Not on file   Transportation Needs: Not on file   Physical Activity: Not on file   Stress: Not on file   Social Connections: Not on file   Intimate Partner Violence: Not on file   Housing Stability: Not on file          Allergies   Allergen Reactions     Atorvastatin Muscle Pain (Myalgia)     Bee Venom Swelling     Asa [Aspirin] Unknown, Other (See  Comments) and Rash     Other reaction(s): Other (Specify in Comments)  Aspirin 325mg. No prior symptoms with 81mg  Heavy bleeding     Penicillins Rash     rash  rash  rash       Current Outpatient Medications   Medication     acyclovir (ZOVIRAX) 400 MG tablet     albuterol (PROAIR HFA, PROVENTIL HFA, VENTOLIN HFA) 108 (90 BASE) MCG/ACT inhaler     buPROPion (WELLBUTRIN XL) 300 MG 24 hr tablet     busPIRone (BUSPAR) 10 MG tablet     calcium carbonate (OS-GISELA 600 MG [place]. [place]) 600 MG tablet     cyabnocobalamin (VITAMIN B-12) 2500 MCG sublingual tablet     DULoxetine (CYMBALTA) 30 MG capsule     ferrous fumarate 65 mg, [place]. FE,-Vitamin C 125 mg (VITRON C)  MG TABS tablet     folic acid (FOLVITE) 400 MCG tablet     furosemide (LASIX) 20 MG tablet     gabapentin (NEURONTIN) 600 MG tablet     Homeopathic Products (ZICAM COLD REMEDY) TBDP     ibuprofen (ADVIL/MOTRIN) 200 MG capsule     Lactobacillus (PROBIOTIC ACIDOPHILUS PO)     loratadine (CLARITIN) 10 MG tablet     metoprolol succinate ER (TOPROL-XL) 25 MG 24 hr tablet     Multiple vitamin TABS     omeprazole (PRILOSEC) 40 MG DR capsule     primidone (MYSOLINE) 250 MG tablet     sertraline (ZOLOFT) 100 MG tablet     tiZANidine (ZANAFLEX) 4 MG tablet     acetaminophen (TYLENOL) 325 MG tablet     atorvastatin (LIPITOR) 10 MG tablet     diphenhydrAMINE-acetaminophen (TYLENOL PM)  MG tablet     rosuvastatin (CRESTOR) 10 MG tablet     No current facility-administered medications for this visit.       REVIEW OF SYSTEMS  Answers for HPI/ROS submitted by the patient on 11/4/2022  General Symptoms: No  Skin Symptoms: No  HENT Symptoms: No  EYE SYMPTOMS: No  HEART SYMPTOMS: No  LUNG SYMPTOMS: No  INTESTINAL SYMPTOMS: No  URINARY SYMPTOMS: No  GYNECOLOGIC SYMPTOMS: No  BREAST SYMPTOMS: No  SKELETAL SYMPTOMS: No  BLOOD SYMPTOMS: No  NERVOUS SYSTEM SYMPTOMS: No - history of essential tremor  MENTAL HEALTH SYMPTOMS: No      PHYSICAL EXAM  /78   Pulse 85   Resp 16    SpO2 98%       General: Awake, alert, oriented. Well nourished, well developed, she is not in any acute distress.  HEENT: Head normocephalic, atraumatic. No carotid bruit. Neck supple. Good range of motion. No palpable thyroid mass.  Heart: Regular rhythm and rate. No murmurs.  Lungs: Clear to auscultation and percussion bilaterally. No ronchi, rales or wheeze.  Abdomen: Soft, non-tender, non-distended. No hepatosplenomegaly.  Extremity: Warm with no clubbing or cyanosis. No lower extremity edema.  Incisions: bilateral frontal incisions, bilateral parietal incisions and bilateral chest wall incisions are all well healed.  No exposed hardware.  No wound breakdown.    Neurological:  Awake, alert and oriented to date, time, place and person. Speech fluent.   Pupils equal, round, reactive to light.  Extraocular movement intact.  Visual fields are full on confrontation.  Hearing is grossly normal to finger rub.   Facial sensation intact.  Face symmetric.  Tongue midline.  Uvula elevates equally.    Motor: full strength throughout.  Sensation: intact to light touch and pinpoint.  Deep tendon reflexes: 1+ throughout. Negative for clonus. Negative for Garcia's sign. No dysmetria.      ASSESSMENT   67 year old female  Essential tremor  Status post left side deep brain stimulator placement, phase I, placement of left side deep brain stimulator electrode, target left ventral intermediate nucleus of the thalamus with microelectrode recording on 3/14/2017  Status post deep brain stimulator placement, phase II, placement of deep brain stimulator generator/battery over the left chest wall on 3/24/2017  Status post right side deep brain stimulator placement, phase I, placement of right side deep brain stimulator electrode, target right ventral intermediate nucleus of the thalamus with microelectrode recording on 3/23/2018  Status post deep brain stimulator placement, phase II, placement of deep brain stimulator generator/battery  over the right chest wall on 4/6/2018.  Depleted DBS battery/generator over the left chest wall.    Ms. Hilary Cornelius presents with depleted DBS generator/battery over the LEFT chest wall which controls the RIGHT side of the body.  This device is the older of the two side system.  It is also set at a higher voltage stimulation.  Therefore, replacement is needed now.    This device on the LEFT side has lasted her 5 years and 7 months.  The generator/battery over the RIGHT chest wall has lasted so far 4 years and 6 months and still has power left.  Given the current setting, I do not think that the generator/battery over the RIGHT chest wall needs to be replaced any time soon.  Therefore, only the LEFT side generator/battery needs to be replaced at this time.    We did briefly discuss the option of a rechargeable system but I do not think that it is warranted.  I did talk to her about the remote programming capabilities and we will need to enroll her in the program.    During today's visit, we discussed the surgical procedure for replacing the DBS generator/battery over the LEFT chest wall.  She currently has the [employer] Infinity 5 generator/battery, and this will be maintained during the upcoming procedure. Risks, benefits, alternative therapies were discussed with the patient, including but not limited to infection and bleeding. This surgical procedure will be performed under MAC with local anesthetic. The thinned part of the wound/pocket may be corrected with mobilization of the tissue under the incision. The pocket may need to be enlarged to minimize the stress on the closure.  Surgical procedure was discussed in detail. All questions were answered, and she expressed understanding    Complete history and physical exam has been performed.      PLAN  1. She will undergo replacement of the DBS generator/battery over the LEFT chest wall under MAC with local anesthetic.   2.  Preop labs today.  3.  Negative Covid  test within 4 days of the surgery.  4.  History and physical exam has been completed.  5.  Surgery case request has been entered.      30 minutes were spent face to face with the patient of which more than 50% of the time was spent counseling and discussing the above issues regarding diagnosis, differential, treatment options, and steps for further evaluation.  5 minutes were spent reviewing patient's chart.  15 minutes were spent on documentation for this encounter.  50 minutes total were spent on this encounter today.      Sincerely,    Henok Javier MD

## 2022-11-08 ENCOUNTER — TELEPHONE (OUTPATIENT)
Dept: NEUROSURGERY | Facility: CLINIC | Age: 67
End: 2022-11-08

## 2022-11-08 NOTE — TELEPHONE ENCOUNTER
I called the patient in regards to scheduling surgery with Dr. Javier. Patient has a covid test scheduled at At home test and pre op has been taken care of with H & P completed by surgeon . Patient is aware that the nursing team will be reaching out within the next few days. I did tell patient that a nurse will reach out within 2-3 days prior to surgery with arrival time and instructions.    Surgeon: Dr. Javier  Date of Surgery: 11/18/2022  Location of surgery: [facility] OR  Pre-Op H&P: Completed by surgeon   Post-Op Appt Date: 11/28/2022   Imaging needed:  No  Discussed COVID-19 testing:  Yes  Pre-cert/Authorization completed:  Yes  Patient aware that pre-op RN will call 2-3 days prior to surgery with arrival time and instructions Yes    Ruddy Perdomo on 11/8/2022 at 3:58 PM

## 2022-11-17 ENCOUNTER — ANESTHESIA EVENT (OUTPATIENT)
Dept: SURGERY | Facility: CLINIC | Age: 67
End: 2022-11-17
Payer: COMMERCIAL

## 2022-11-17 NOTE — ANESTHESIA PREPROCEDURE EVALUATION
Anesthesia Pre-Procedure Evaluation    Patient: Hilary Cornelius   MRN: [id number] : 1955        Procedure : Procedure(s):  Replacement of deep brain stimulator generator/battery over left chest wall          Past Medical History:   Diagnosis Date     Chronic pain 2016     Cognitive disorder 2016 evaluation   IMPRESSIONS AND RECOMMENDATIONS  Current results indicate overall intellectual functioning estimated fall in the mildly impaired range, marginally below premorbid estimates of low average based on single word reading abilities. She demonstrates a relative inefficiency in learning of new material, but retains information quite well once she has learned it. Visual-spatial abilities     Exercise-induced asthma      Gastroesophageal reflux disease 3/30/2015    XR ESOPHAGRAM2016  [facility]  Result Narrative  This document is currently in Final Status  Exam Accession# [id number]  XR ESOPHAGRAM  CLINICAL INFORMATION: Dysphagia;   COMPARISON: None.  FINDINGS: The esophagus is normal in course and caliber in this post gastric bypass patient. No intrinsic or extrinsic mass lesions are identified. No strictures. There is a small hiatal hernia. Gastroesophageal reflux is observed during this examination  with numerous tertiary contractions and delayed esophageal clearance.  IMPRESSION:  1. Gastroesophageal reflux with numerous tertiary contractions and delayed esophageal clearance. 2. Sliding hiatal hernia in this post gastric bypass patient.  Dictated By: Dandre Wyatt MD 2016 9:47 AM Edited By: MIKE 2016 10:02 AM  Electronically Signed: Dandre Wyatt MD 2016 4:24 PM   Status Results Details         H/O bariatric surgery 2016     Headache disorder 2016     Heart murmur 2016     Rheumatic fever      T2DM (type 2 diabetes mellitus) (H)     Under control s/p gastric bypass     Tremor 2016      Past Surgical History:   Procedure Laterality Date      APPENDECTOMY       CHOLECYSTECTOMY       COLONOSCOPY       ENT SURGERY       GASTRIC BYPASS  2005     IMPLANT DEEP BRAIN STIMULATION GENERATOR / BATTERY Left 3/24/2017    Procedure: IMPLANT DEEP BRAIN STIMULATION GENERATOR / BATTERY;  Surgeon: Henok Javier MD;  Location: [facility] OR     IMPLANT DEEP BRAIN STIMULATION GENERATOR / BATTERY Right 4/6/2018    Procedure: IMPLANT DEEP BRAIN STIMULATION GENERATOR / BATTERY;  Deep Brain Stimulator Placement, Phase II, Placement Of Deep Brain Stimulator Generator/Battery Over The Right Chest Wall;  Surgeon: Henok Javier MD;  Location: [facility] OR     OPTICAL TRACKING SYSTEM INSERTION DEEP BRAIN STIMULATION Left 3/14/2017    Procedure: OPTICAL TRACKING SYSTEM INSERTION DEEP BRAIN STIMULATION;  Surgeon: Henok Javier MD;  Location: [facility] OR     OPTICAL TRACKING SYSTEM INSERTION DEEP BRAIN STIMULATION Right 3/23/2018    Procedure: OPTICAL TRACKING SYSTEM INSERTION DEEP BRAIN STIMULATION;  Stealth Assisted Right Deep Brain Stimulator Placement, Phase I, Placement Right Side Deep Brain Stimulator Electrode, Target Right Ventral Intermediate Nucleus Of The Thalamus With Microelectrode Recording;  Surgeon: Henok Javier MD;  Location: [facility] OR     ORTHOPEDIC SURGERY       thoracic spine surgery       TUBAL LIGATION        Allergies   Allergen Reactions     Atorvastatin Muscle Pain (Myalgia)     Bee Venom Swelling     Asa [Aspirin] Unknown, Other (See Comments) and Rash     Other reaction(s): Other (Specify in Comments)  Aspirin 325mg. No prior symptoms with 81mg  Heavy bleeding     Penicillins Rash     rash  rash  rash      Social History     Tobacco Use     Smoking status: Never     Smokeless tobacco: Never   Substance Use Topics     Alcohol use: Not Currently     Comment: social      Wt Readings from Last 1 Encounters:   02/14/22 85.7 kg (189 lb)        Anesthesia Evaluation            ROS/MED HX  ENT/Pulmonary:     (+) sleep apnea, Intermittent, asthma      Neurologic: Comment: Essential Tremor   Cognitive decline  S/p R and L DBS placement in 2017 and 2018 now with end of life for L sided generator      Cardiovascular: Comment: Stress (lexiscan) 12/31/2021:  Normal (negative for inducible ischemia)  LVEF 70%    TTE 12/31/2021:  LVEF 60-65%  No RWMA  RV normal  RVSP 18+RAP      METS/Exercise Tolerance: 3 - Able to walk 1-2 blocks without stopping    Hematologic:       Musculoskeletal:       GI/Hepatic: Comment: Hx of gastric bypass     (+) GERD,     Renal/Genitourinary:       Endo:     (+) type II DM (resolved after gastric bypass),     Psychiatric/Substance Use:       Infectious Disease:       Malignancy:       Other:      (+) , H/O Chronic Pain,        Physical Exam    Airway        Mallampati: II   TM distance: > 3 FB   Neck ROM: full   Mouth opening: > 3 cm    Respiratory Devices and Support         Dental     Comment: Poor dentition, multiple bottomed out teet and dental caries    All incisors are bottomed out        Cardiovascular          Rhythm and rate: regular and normal     Pulmonary   pulmonary exam normal                OUTSIDE LABS:  CBC:   Lab Results   Component Value Date    WBC 5.3 11/07/2022    WBC 5.5 03/24/2018    HGB 13.4 11/07/2022    HGB 11.9 03/24/2018    HCT 38.2 11/07/2022    HCT 35.9 03/24/2018     11/07/2022     03/24/2018     BMP:   Lab Results   Component Value Date     11/07/2022     03/24/2018    POTASSIUM 4.1 11/07/2022    POTASSIUM 3.6 03/24/2018    CHLORIDE 102 11/07/2022    CHLORIDE 106 03/24/2018    CO2 27 11/07/2022    CO2 27 03/24/2018    BUN 15.9 11/07/2022    BUN 5 (L) 03/24/2018    CR 0.57 11/07/2022    CR 0.45 (L) 03/24/2018     (H) 11/07/2022     (H) 03/24/2018     COAGS:   Lab Results   Component Value Date    PTT 25 11/07/2022    INR 1.02 11/07/2022     POC:   Lab Results   Component Value Date     (H) 04/06/2018     HEPATIC: No results found for: ALBUMIN, PROTTOTAL, ALT,  AST, GGT, ALKPHOS, BILITOTAL, BILIDIRECT, KESHAWN  OTHER:   Lab Results   Component Value Date    A1C 6.6 (H) 03/22/2018    GISELA 9.3 11/07/2022    PHOS 4.1 03/24/2018    MAG 1.7 03/24/2018       Anesthesia Plan    ASA Status:  2   NPO Status:  NPO Appropriate    Anesthesia Type: MAC.   Induction: Intravenous, Propofol.   Maintenance: TIVA.        Consents    Anesthesia Plan(s) and associated risks, benefits, and realistic alternatives discussed. Questions answered and patient/representative(s) expressed understanding.    - Discussed:     - Discussed with:  Patient      - Extended Intubation/Ventilatory Support Discussed: No.      - Patient is DNR/DNI Status: No    Use of blood products discussed: No .     Postoperative Care            Comments:                Isidro Blanco MD

## 2022-11-18 ENCOUNTER — HOSPITAL ENCOUNTER (OUTPATIENT)
Facility: CLINIC | Age: 67
Discharge: HOME | End: 2022-11-18
Attending: NEUROLOGICAL SURGERY | Admitting: NEUROLOGICAL SURGERY
Payer: COMMERCIAL

## 2022-11-18 ENCOUNTER — ANESTHESIA (OUTPATIENT)
Dept: SURGERY | Facility: CLINIC | Age: 67
End: 2022-11-18
Payer: COMMERCIAL

## 2022-11-18 VITALS
RESPIRATION RATE: 15 BRPM | OXYGEN SATURATION: 98 % | SYSTOLIC BLOOD PRESSURE: 117 MMHG | HEART RATE: 88 BPM | WEIGHT: 191.14 LBS | HEIGHT: 66 IN | DIASTOLIC BLOOD PRESSURE: 82 MMHG | TEMPERATURE: 98.3 F | BODY MASS INDEX: 30.72 KG/M2

## 2022-11-18 DIAGNOSIS — R51.9 HEADACHE DISORDER: ICD-10-CM

## 2022-11-18 DIAGNOSIS — G25.0 ESSENTIAL TREMOR: Primary | ICD-10-CM

## 2022-11-18 DIAGNOSIS — Z96.89 S/P DEEP BRAIN STIMULATOR PLACEMENT: ICD-10-CM

## 2022-11-18 DIAGNOSIS — Z45.42 END OF BATTERY LIFE OF DEEP BRAIN STIMULATOR: ICD-10-CM

## 2022-11-18 LAB
GLUCOSE BLDC GLUCOMTR-MCNC: 125 MG/DL (ref 70–99)
GLUCOSE BLDC GLUCOMTR-MCNC: 148 MG/DL (ref 70–99)

## 2022-11-18 PROCEDURE — 250N000011 HC RX IP 250 OP 636: Performed by: NEUROLOGICAL SURGERY

## 2022-11-18 PROCEDURE — 710N000012 HC RECOVERY PHASE 2, PER MINUTE: Performed by: NEUROLOGICAL SURGERY

## 2022-11-18 PROCEDURE — 360N000076 HC SURGERY LEVEL 3, PER MIN: Performed by: NEUROLOGICAL SURGERY

## 2022-11-18 PROCEDURE — 370N000017 HC ANESTHESIA TECHNICAL FEE, PER MIN: Performed by: NEUROLOGICAL SURGERY

## 2022-11-18 PROCEDURE — 82962 GLUCOSE BLOOD TEST: CPT | Mod: 91

## 2022-11-18 PROCEDURE — 258N000003 HC RX IP 258 OP 636: Performed by: STUDENT IN AN ORGANIZED HEALTH CARE EDUCATION/TRAINING PROGRAM

## 2022-11-18 PROCEDURE — 250N000009 HC RX 250: Performed by: NEUROLOGICAL SURGERY

## 2022-11-18 PROCEDURE — 61885 INSRT/REDO NEUROSTIM 1 ARRAY: CPT | Mod: LT | Performed by: NEUROLOGICAL SURGERY

## 2022-11-18 PROCEDURE — 250N000013 HC RX MED GY IP 250 OP 250 PS 637: Performed by: ANESTHESIOLOGY

## 2022-11-18 PROCEDURE — 250N000009 HC RX 250: Performed by: STUDENT IN AN ORGANIZED HEALTH CARE EDUCATION/TRAINING PROGRAM

## 2022-11-18 PROCEDURE — 272N000001 HC OR GENERAL SUPPLY STERILE: Performed by: NEUROLOGICAL SURGERY

## 2022-11-18 PROCEDURE — 272N000002 HC OR SUPPLY OTHER OPNP: Performed by: NEUROLOGICAL SURGERY

## 2022-11-18 PROCEDURE — 250N000011 HC RX IP 250 OP 636: Performed by: STUDENT IN AN ORGANIZED HEALTH CARE EDUCATION/TRAINING PROGRAM

## 2022-11-18 PROCEDURE — C1767 GENERATOR, NEURO NON-RECHARG: HCPCS | Performed by: NEUROLOGICAL SURGERY

## 2022-11-18 PROCEDURE — 999N000141 HC STATISTIC PRE-PROCEDURE NURSING ASSESSMENT: Performed by: NEUROLOGICAL SURGERY

## 2022-11-18 DEVICE — GENERATOR DBS SYSTEM INFINITY 5 IPG 6660ANS: Type: IMPLANTABLE DEVICE | Site: CHEST | Status: FUNCTIONAL

## 2022-11-18 RX ORDER — ACETAMINOPHEN 325 MG/1
325-650 TABLET ORAL EVERY 6 HOURS PRN
Qty: 30 TABLET | Refills: 0 | Status: SHIPPED | OUTPATIENT
Start: 2022-11-18

## 2022-11-18 RX ORDER — ACETAMINOPHEN 325 MG/1
975 TABLET ORAL ONCE
Status: COMPLETED | OUTPATIENT
Start: 2022-11-18 | End: 2022-11-18

## 2022-11-18 RX ORDER — PROPOFOL 10 MG/ML
INJECTION, EMULSION INTRAVENOUS PRN
Status: DISCONTINUED | OUTPATIENT
Start: 2022-11-18 | End: 2022-11-18

## 2022-11-18 RX ORDER — OXYCODONE HYDROCHLORIDE 5 MG/1
5 TABLET ORAL EVERY 6 HOURS PRN
Qty: 12 TABLET | Refills: 0 | Status: SHIPPED | OUTPATIENT
Start: 2022-11-18 | End: 2022-11-18

## 2022-11-18 RX ORDER — PROPOFOL 10 MG/ML
INJECTION, EMULSION INTRAVENOUS CONTINUOUS PRN
Status: DISCONTINUED | OUTPATIENT
Start: 2022-11-18 | End: 2022-11-18

## 2022-11-18 RX ORDER — DEXMEDETOMIDINE HYDROCHLORIDE 4 UG/ML
.1-1.2 INJECTION, SOLUTION INTRAVENOUS CONTINUOUS
Status: DISCONTINUED | OUTPATIENT
Start: 2022-11-18 | End: 2022-11-18 | Stop reason: HOSPADM

## 2022-11-18 RX ORDER — CLINDAMYCIN HCL 300 MG
300 CAPSULE ORAL 4 TIMES DAILY
Qty: 28 CAPSULE | Refills: 0 | Status: SHIPPED | OUTPATIENT
Start: 2022-11-18

## 2022-11-18 RX ORDER — CLINDAMYCIN PHOSPHATE 900 MG/50ML
900 INJECTION, SOLUTION INTRAVENOUS SEE ADMIN INSTRUCTIONS
Status: DISCONTINUED | OUTPATIENT
Start: 2022-11-18 | End: 2022-11-18 | Stop reason: HOSPADM

## 2022-11-18 RX ORDER — EPHEDRINE SULFATE 50 MG/ML
INJECTION, SOLUTION INTRAVENOUS PRN
Status: DISCONTINUED | OUTPATIENT
Start: 2022-11-18 | End: 2022-11-18

## 2022-11-18 RX ORDER — SODIUM CHLORIDE, SODIUM LACTATE, POTASSIUM CHLORIDE, CALCIUM CHLORIDE 600; 310; 30; 20 MG/100ML; MG/100ML; MG/100ML; MG/100ML
INJECTION, SOLUTION INTRAVENOUS CONTINUOUS
Status: DISCONTINUED | OUTPATIENT
Start: 2022-11-18 | End: 2022-11-18 | Stop reason: HOSPADM

## 2022-11-18 RX ORDER — SODIUM CHLORIDE, SODIUM LACTATE, POTASSIUM CHLORIDE, CALCIUM CHLORIDE 600; 310; 30; 20 MG/100ML; MG/100ML; MG/100ML; MG/100ML
INJECTION, SOLUTION INTRAVENOUS CONTINUOUS PRN
Status: DISCONTINUED | OUTPATIENT
Start: 2022-11-18 | End: 2022-11-18

## 2022-11-18 RX ORDER — OXYCODONE HYDROCHLORIDE 5 MG/1
5 TABLET ORAL EVERY 6 HOURS PRN
Qty: 12 TABLET | Refills: 0 | Status: SHIPPED | OUTPATIENT
Start: 2022-11-18

## 2022-11-18 RX ORDER — LIDOCAINE 40 MG/G
CREAM TOPICAL
Status: DISCONTINUED | OUTPATIENT
Start: 2022-11-18 | End: 2022-11-18 | Stop reason: HOSPADM

## 2022-11-18 RX ORDER — CLINDAMYCIN PHOSPHATE 900 MG/50ML
900 INJECTION, SOLUTION INTRAVENOUS
Status: COMPLETED | OUTPATIENT
Start: 2022-11-18 | End: 2022-11-18

## 2022-11-18 RX ADMIN — PROPOFOL 20 MG: 10 INJECTION, EMULSION INTRAVENOUS at 14:22

## 2022-11-18 RX ADMIN — EPHEDRINE SULFATE 5 MG: 50 INJECTION, SOLUTION INTRAVENOUS at 14:42

## 2022-11-18 RX ADMIN — PROPOFOL 30 MG: 10 INJECTION, EMULSION INTRAVENOUS at 14:52

## 2022-11-18 RX ADMIN — PHENYLEPHRINE HYDROCHLORIDE 100 MCG: 10 INJECTION INTRAVENOUS at 14:53

## 2022-11-18 RX ADMIN — PHENYLEPHRINE HYDROCHLORIDE 200 MCG: 10 INJECTION INTRAVENOUS at 15:40

## 2022-11-18 RX ADMIN — PROPOFOL 20 MG: 10 INJECTION, EMULSION INTRAVENOUS at 14:20

## 2022-11-18 RX ADMIN — PHENYLEPHRINE HYDROCHLORIDE 100 MCG: 10 INJECTION INTRAVENOUS at 14:31

## 2022-11-18 RX ADMIN — PHENYLEPHRINE HYDROCHLORIDE 100 MCG: 10 INJECTION INTRAVENOUS at 14:42

## 2022-11-18 RX ADMIN — PHENYLEPHRINE HYDROCHLORIDE 100 MCG: 10 INJECTION INTRAVENOUS at 15:19

## 2022-11-18 RX ADMIN — EPHEDRINE SULFATE 5 MG: 50 INJECTION, SOLUTION INTRAVENOUS at 14:40

## 2022-11-18 RX ADMIN — PHENYLEPHRINE HYDROCHLORIDE 100 MCG: 10 INJECTION INTRAVENOUS at 15:31

## 2022-11-18 RX ADMIN — PHENYLEPHRINE HYDROCHLORIDE 100 MCG: 10 INJECTION INTRAVENOUS at 15:03

## 2022-11-18 RX ADMIN — EPHEDRINE SULFATE 5 MG: 50 INJECTION, SOLUTION INTRAVENOUS at 15:04

## 2022-11-18 RX ADMIN — SODIUM CHLORIDE, POTASSIUM CHLORIDE, SODIUM LACTATE AND CALCIUM CHLORIDE: 600; 310; 30; 20 INJECTION, SOLUTION INTRAVENOUS at 14:12

## 2022-11-18 RX ADMIN — EPHEDRINE SULFATE 5 MG: 50 INJECTION, SOLUTION INTRAVENOUS at 15:13

## 2022-11-18 RX ADMIN — CLINDAMYCIN PHOSPHATE 900 MG: 900 INJECTION, SOLUTION INTRAVENOUS at 14:26

## 2022-11-18 RX ADMIN — PROPOFOL 125 MCG/KG/MIN: 10 INJECTION, EMULSION INTRAVENOUS at 14:18

## 2022-11-18 RX ADMIN — PHENYLEPHRINE HYDROCHLORIDE 100 MCG: 10 INJECTION INTRAVENOUS at 15:13

## 2022-11-18 RX ADMIN — ACETAMINOPHEN 975 MG: 325 TABLET, FILM COATED ORAL at 11:48

## 2022-11-18 RX ADMIN — EPHEDRINE SULFATE 5 MG: 50 INJECTION, SOLUTION INTRAVENOUS at 15:30

## 2022-11-18 ASSESSMENT — ACTIVITIES OF DAILY LIVING (ADL)
ADLS_ACUITY_SCORE: 20
ADLS_ACUITY_SCORE: 20
ADLS_ACUITY_SCORE: 35
ADLS_ACUITY_SCORE: 20

## 2022-11-18 NOTE — OR NURSING
Per Dr. Cantrell patient ok to drive next week, does not need to be seen in clinic before like instructions say. Also ok to start showering tomorrow (no soaking or scrubbing incision, only shower and pat dry), does not need to wait until post op day 3.   Pharmacy instructed that patient take 1 clindamycin tonight at 8:30 pm, and then start taking 4 doses/day tomorrow.  Patient verbalizes understanding of all instructions.

## 2022-11-18 NOTE — ANESTHESIA POSTPROCEDURE EVALUATION
Patient: Hilary Cornelius    Procedure: Procedure(s):  Replacement of deep brain stimulator generator/battery over left chest wall       Anesthesia Type:  MAC    Note:  Disposition: Outpatient   Postop Pain Control: Uneventful            Sign Out: Well controlled pain   PONV: No   Neuro/Psych: Uneventful            Sign Out: Acceptable/Baseline neuro status   Airway/Respiratory: Uneventful            Sign Out: Acceptable/Baseline resp. status   CV/Hemodynamics: Uneventful            Sign Out: Acceptable CV status; No obvious hypovolemia; No obvious fluid overload   Other NRE: NONE   DID A NON-ROUTINE EVENT OCCUR? No           Last vitals:  Vitals Value Taken Time   /82 11/18/22 1629   Temp 36.9  C (98.4  F) 11/18/22 1600   Pulse 88 11/18/22 1629   Resp 16 11/18/22 1600   SpO2 99 % 11/18/22 1633   Vitals shown include unvalidated device data.    Electronically Signed By: Dustin Rinaldi MD  November 18, 2022  4:34 PM

## 2022-11-18 NOTE — ANESTHESIA CARE TRANSFER NOTE
Patient: Hilary Cornelius    Procedure: Procedure(s):  Replacement of deep brain stimulator generator/battery over left chest wall       Diagnosis: End of battery life of deep brain stimulator [Z45.42]  S/P deep brain stimulator placement [Z96.89]  Essential tremor [G25.0]  Diagnosis Additional Information: No value filed.    Anesthesia Type:   MAC     Note:    Oropharynx: oropharynx clear of all foreign objects and spontaneously breathing  Level of Consciousness: awake  Oxygen Supplementation: nasal cannula    Independent Airway: airway patency satisfactory and stable  Dentition: dentition unchanged  Vital Signs Stable: post-procedure vital signs reviewed and stable  Report to RN Given: handoff report given  Patient transferred to: Phase II    Handoff Report: Identifed the Patient, Identified the Reponsible Provider, Reviewed the pertinent medical history, Discussed the surgical course, Reviewed Intra-OP anesthesia mangement and issues during anesthesia, Set expectations for post-procedure period and Allowed opportunity for questions and acknowledgement of understanding      Vitals:  Vitals Value Taken Time   BP     Temp     Pulse     Resp     SpO2         Electronically Signed By: LUDWIG Xiao CRNA  November 18, 2022  4:01 PM

## 2022-11-18 NOTE — DISCHARGE INSTRUCTIONS
[facility]  Same-Day Surgery   Adult Discharge Orders & Instructions     For 24 hours after surgery    Get plenty of rest.  A responsible adult must stay with you for at least 24 hours after you leave the hospital.   Do not drive or use heavy equipment.  If you have weakness or tingling, don't drive or use heavy equipment until this feeling goes away.  Do not drink alcohol.  Avoid strenuous or risky activities.  Ask for help when climbing stairs.   You may feel lightheaded.  IF so, sit for a few minutes before standing.  Have someone help you get up.   If you have nausea (feel sick to your stomach): Drink only clear liquids such as apple juice, ginger ale, broth or 7-Up.  Rest may also help.  Be sure to drink enough fluids.  Move to a regular diet as you feel able.  You may have a slight fever. Call the doctor if your fever is over 100 F (37.7 C) (taken under the tongue) or lasts longer than 24 hours.  You may have a dry mouth, a sore throat, muscle aches or trouble sleeping.  These should go away after 24 hours.  Do not make important or legal decisions.   Call your doctor for any of the followin.  Signs of infection (fever, growing tenderness at the surgery site, a large amount of drainage or bleeding, severe pain, foul-smelling drainage, redness, swelling).    2. It has been over 8 to 10 hours since surgery and you are still not able to urinate (pass water).    3.  Headache for over 24 hours.    To contact a doctor, call Dr Javier at [phone] at the Neurosurgery Clinic from 8 am till 5 pm --   or:    '   [phone] and ask for the resident on call for Neurosurgery (answered 24 hours a day)  '   Emergency Department:    [facility]: [phone]       (TTY for hearing impaired: [phone])    [facility]: [phone]       (TTY for hearing impaired: [phone])

## 2022-11-18 NOTE — H&P
"NEUROSURGERY    H&P performed on 11/7/2022 by me was reviewed.  Patient reports no new changes.  No new complaints.    Preop labs reviewed.      PHYSICAL EXAM  /85 (BP Location: Right arm)   Pulse 82   Temp 98.1  F (36.7  C) (Oral)   Resp 16   Ht 1.676 m (5' 6\")   Wt 86.7 kg (191 lb 2.2 oz)   SpO2 99%   BMI 30.85 kg/m      General: Awake, alert, oriented. Well nourished, well developed, she is not in any acute distress.  HEENT: Head normocephalic, atraumatic. No carotid bruit. Neck supple. Good range of motion. No palpable thyroid mass.  Heart: Regular rhythm and rate. No murmurs.  Lungs: Clear to auscultation and percussion bilaterally. No ronchi, rales or wheeze.  Abdomen: Soft, non-tender, non-distended. No hepatosplenomegaly.  Extremity: Warm with no clubbing or cyanosis. No lower extremity edema.  Incisions: bilateral frontal incisions, bilateral parietal incisions and bilateral chest wall incisions are all well healed.  No exposed hardware.  No wound breakdown.    Neurological:  Awake, alert and oriented to date, time, place and person. Speech fluent.   Pupils equal, round, reactive to light.  Extraocular movement intact.  Visual fields are full on confrontation.  Hearing is grossly normal to finger rub.   Facial sensation intact.  Face symmetric.  Tongue midline.  Uvula elevates equally.    Motor: full strength throughout.  Sensation: intact to light touch and pinpoint.  Deep tendon reflexes: 1+ throughout. Negative for clonus. Negative for Garcia's sign. No dysmetria.      ASSESSMENT   67 year old female  Essential tremor  Status post left side deep brain stimulator placement, phase I, placement of left side deep brain stimulator electrode, target left ventral intermediate nucleus of the thalamus with microelectrode recording on 3/14/2017  Status post deep brain stimulator placement, phase II, placement of deep brain stimulator generator/battery over the left chest wall on 3/24/2017  Status " post right side deep brain stimulator placement, phase I, placement of right side deep brain stimulator electrode, target right ventral intermediate nucleus of the thalamus with microelectrode recording on 3/23/2018  Status post deep brain stimulator placement, phase II, placement of deep brain stimulator generator/battery over the right chest wall on 4/6/2018.  Depleted DBS battery/generator over the left chest wall.      Patient presents for her left side DBS generator/battery replacement surgery today.  She reports no new changes in her health.  No new complaints.  We will proceed with the surgery as scheduled.    PLAN  1. Replacement of the DBS generator/battery over the LEFT chest wall under MAC with local anesthetic today.

## 2022-11-18 NOTE — BRIEF OP NOTE
[facility]    Brief Operative Note    Pre-operative diagnosis: End of battery life of deep brain stimulator [Z45.42]  S/P deep brain stimulator placement [Z96.89]  Essential tremor [G25.0]  Post-operative diagnosis Same as pre-operative diagnosis    Procedure: Procedure(s):  Replacement of deep brain stimulator generator/battery over left chest wall  Surgeon: Surgeon(s) and Role:     * Henok Javier MD - Primary     * Keith Cantrell MD - Resident - Assisting  Anesthesia: MAC with Local   Estimated Blood Loss: Minimal    Drains: None  Specimens: * No specimens in log *  Findings:   None.  Complications: None.  Implants:   Implant Name Type Inv. Item Serial No.  Lot No. LRB No. Used Action   GENERATOR DBS SYSTEM INFINITY 5 IPG [id number] - [id number] Neurology device GENERATOR DBS SYSTEM INFINITY 5 IPG [id number] [id number] [employer]  Left 1 Implanted   Implantable Pulse Generator Neurology device GENERATOR DBS SYSTEM INFINITY 5 IPG [id number] [id number] [employer]  Right 1 Explanted     Keith Cantrell MD PGY1  Plastic and Reconstructive Surgery  Neurosurgery

## 2022-11-21 ENCOUNTER — PATIENT OUTREACH (OUTPATIENT)
Dept: NEUROSURGERY | Facility: CLINIC | Age: 67
End: 2022-11-21

## 2022-11-21 NOTE — PROGRESS NOTES
Pt s/p DBS battery replacement over left chest wall by Dr. Javier on 11/18/22. Left VM checking on pt's postop status. Left my number and the number to reach the on-call neurosurgery resident. Will follow-up.

## 2022-12-07 NOTE — OP NOTE
PATIENT NAME: RONY MARTIN  YOB: 1955  MRN:   [id number]  ACCOUNT:  [id number]      DATE OF PROCEDURE:  11/18/2022    PREOPERATIVE DIAGNOSIS:  1.  Essential tremor  2.  Status post left side deep brain stimulator placement, phase I, placement of left side deep brain stimulator electrode, target left ventral intermediate nucleus of the thalamus with microelectrode recording on 3/14/2017  3.  Status post deep brain stimulator placement, phase II, placement of deep brain stimulator generator/battery over the left chest wall on 3/24/2017  4.  Status post right side deep brain stimulator placement, phase I, placement of right side deep brain stimulator electrode, target right ventral intermediate nucleus of the thalamus with microelectrode recording on 3/23/2018  5.  Status post deep brain stimulator placement, phase II, placement of deep brain stimulator generator/battery over the right chest wall on 4/6/2018  6.  Depleted DBS battery/generator over the left chest wall    POSTOPERATIVE DIAGNOSIS:  1.  Essential tremor  2.  Status post left side deep brain stimulator placement, phase I, placement of left side deep brain stimulator electrode, target left ventral intermediate nucleus of the thalamus with microelectrode recording on 3/14/2017  3.  Status post deep brain stimulator placement, phase II, placement of deep brain stimulator generator/battery over the left chest wall on 3/24/2017  4.  Status post right side deep brain stimulator placement, phase I, placement of right side deep brain stimulator electrode, target right ventral intermediate nucleus of the thalamus with microelectrode recording on 3/23/2018  5.  Status post deep brain stimulator placement, phase II, placement of deep brain stimulator generator/battery over the right chest wall on 4/6/2018  6.  Depleted DBS battery/generator over the left chest wall    PROCEDURES PERFORMED:  1.  Replacement of deep brain stimulator  generator/battery, model Infinity 5, over the left chest wall with connection to one electrode array.  2.  Revision of the left chest wall generator/battery pocket.  3.  Electrical interrogation and analysis of the left side deep brain stimulator system.    ATTENDING SURGEON:  Henok Javier MD, PhD.    ASSISTANT SURGEON:  Keith Cantrell MD    ANESTHESIA:  Monitored anesthesia care and local anesthetic.    ESTIMATED BLOOD LOSS:  1 mL    COMPLICATIONS:  None    FINDINGS:  No sign of infection.  No sign of hardware breakage or damage.  With new generator/battery connected, all the impedance values were within normal.  No problems were found.    EXPLANTS:  Old [employer] DBS generator/battery, left chest wall, Infinity 5, REF 6660, S/N [id number]    IMPLANTS:  New Abbott DBS generator/battery, left chest wall, Infinity 5, REF 6660, S/N [id number]    INDICATIONS FOR PROCEDURE:  Ms. Hilary Cornelius presents with depleted DBS generator/battery over the left chest wall.  The patient has a diagnosis of essential tremor and she is s/p left side deep brain stimulator placement, phase I, placement of left side deep brain stimulator electrode, target left ventral intermediate nucleus of the thalamus with microelectrode recording on 3/14/2017 and s/p deep brain stimulator placement, phase II, placement of deep brain stimulator generator/battery over the left chest wall on 3/24/2017.  Subsequently, she wanted her second side implanted so she underwent right side deep brain stimulator placement, phase I, placement of right side deep brain stimulator electrode, target right ventral intermediate nucleus of the thalamus with microelectrode recording on 3/23/2018 and s/p deep brain stimulator placement, phase II, placement of deep brain stimulator generator/battery over the right chest wall on 4/6/2018.  Patient states that she is doing well and she is getting great benefit from the DBS.  However, her right body DBS system, implanted on the  left side, is giving her a replacement warning when her controller is connected to the generator/battery.  The left side generator/battery was noted to be at 2.83 Volts on 2/14/2022 and it is now in need of replacement.  It has lasted her 5 years and 7 months and it is set at 3.4 Volts for stimulation.  Of note, her left body DBS system, implanted on the right side, is at 2.96 Volts.  It has lasted her so far 4 years and 6 months and it is set at 1.1 Volts for stimulation.  It seems that this generator/battery still has plenty of power remaining.  Patient states that she is in her usual state of health and she denies any new illness.  She is not on any anticoagulant or antiplatelet therapy.  She stopped taking aspirin as well.  Patient denies any abnormal shocks and she denies any issues with the current location of the generator/battery.  We did briefly discuss the option of a rechargeable system but I do not think that it is warranted.  I did talk to her about the remote programming capabilities and we will need to enroll her in the program.  We discussed the surgical procedure for replacing the DBS generator/battery over the left chest wall.  She currently has the Abbott Infinity 5 generator/battery, and this will be maintained during the upcoming procedure. Risks, benefits, alternative therapies were discussed with the patient, including but not limited to infection and bleeding. This surgical procedure will be performed under MAC with local anesthetic. The thinned part of the wound/pocket may be corrected with mobilization of the tissue under the incision. The pocket may need to be enlarged to minimize the stress on the closure.  Surgical procedure was discussed in detail. All questions were answered, and she expressed understanding.    DESCRIPTION OF PROCEDURE:  Informed consent was obtained from the patient and her left chest was marked, specifically the previous incision.  She was brought to the operating  theater and was laid in a supine position.  Her left arm was tucked.  All pressure points were padded appropriately.  Monitored anesthesia care was induced and maintained throughout the entire case.  Her scar and previous incision over the left chest wall was prepped and draped in the usual sterile fashion.  Time out was performed confirming the patient's identity, procedure to be performed, site and side of the surgery and the administration of prophylactic preoperative antibiotic.  Lidocaine 1% with 1:100,000 epinephrine mixed with Marcaine 1/4% plain, 50:50 mix, was injected in the subcutaneous space at the intended incision site, which was the previously well healed incision.  Total of 30 mL was used.  The left and right side DBS system was turned to the surgery safe mode.    We turned our attention to the left side.  We used a #10 blade scalpel to make the skin incision, going over the previously well healed scar.  We carried our dissection through the dermal layer until we reached the subcutaneous fat and then the generator/battery capsule.  The monopolar cautery was used to dissect the subcutaneous tissue and #10 blade to open the capsule, taking great care not to damage the hardware.  We gently opened up the fibrous capsule surrounding the generator/battery to expose it.  Care was taken to open the capsule while lifting the capsule itself away from the generator/battery, taking great care not to come in contact with the metal casing or the wire.  The generator/battery was mobile and we were able to remove it easily, taking care to attend to the location of the wire.  The scar surrounding and anchoring the extension wire was carefully dissected and wire freed.  The extension wire was examined and was found to be without any damage or erosion or defect.  There were no obvious signs of infection.    We then performed blunt and sharp dissection and monopolar cautery within the pocket after the generator/battery  had been removed in order to revise the chest wall pocket for the new generator/battery, so as to reduce the tension on the wound closure.  Therefore, the pocket was generously enlarged.  We also dissected free and undermined the superior aspect of the pocket so that the closure would have less tension after closure and more subcutaneous tissue would be available.  The entire cavity was copiously irrigated with normal saline and meticulous hemostasis was obtained and the pocket was dried.      The old generator/battery, Infinity 5, was disconnected from the extension wire that was inserted at the anterior portal.  We used a special wire long tool to do this.  The connector contacts were cleaned.  The new generator/battery, Abbott Infinity 5, was connected to the extension wire with the extension wire being inserted into the anterior portal.  Posterior portal was plugged with a dead end plug.  Therefore, one electrode array was connected to generator/battery and secured.    And then the new generator/battery was placed back into the pocket along with the excess extension wire being placed posteriorly and around the periphery of the generator/battery.  The generator/battery was secured to the new position within the pocket using two 2-0 Ethibond sutures.    The left side DBS system was tested and interrogated electrically and was found to be working well and the impedance values were noted to be within normal.  No problems were found.      With the satisfactory placement of the new system, we began closing the wound.  The deep pocket or capsule was reapproximated using 3-0 Vicryl sutures in an simple interrupted fashion.  The subcutaneous fat layer was then reapproximated using 3-0 Vicryl sutures in an inverted interrupted fashion to provide padding to the skin for the closure.  The dermal layer was reapproximated using 3-0 Vicryl sutures in an inverted interrupted fashion.  The skin was reapproximated using 4-0  Monocryl suture in a subcuticular fashion.  The incision was then cleaned with wet and dry sponges followed by ChloraPrep and then Exofin skin glue was applied.    The left and right side DBS system was turned back on to the therapeutic mode.    At the end of the case, all counts including needle, sponge and instrument counts were correct x 2.  There were no complications.  Patient was awakened and taken to the recovery room in a stable condition.     IMurray M.D., Ph.D., Neurosurgery Attending, was present and scrubbed for the entire case and performed the key and critical portions of the case.      MURRAY CHAVEZ MD, PHD

## 2023-05-06 ENCOUNTER — HEALTH MAINTENANCE LETTER (OUTPATIENT)
Age: 68
End: 2023-05-06

## 2023-12-16 ENCOUNTER — HEALTH MAINTENANCE LETTER (OUTPATIENT)
Age: 68
End: 2023-12-16

## 2023-12-28 NOTE — ANESTHESIA POSTPROCEDURE EVALUATION
Patient: Hilary Cornelius    Procedure(s):  Stealth Assisted Right Deep Brain Stimulator Placement, Phase I, Placement Right Side Deep Brain Stimulator Electrode, Target Right Ventral Intermediate Nucleus Of The Thalamus With Microelectrode Recording - Wound Class: I-Clean    Diagnosis:Essential Tremor   Diagnosis Additional Information: No value filed.    Anesthesia Type:  MAC    Note:  Anesthesia Post Evaluation    Patient location during evaluation: PACU  Patient participation: Able to fully participate in evaluation  Level of consciousness: awake and alert  Pain management: satisfactory to patient  Airway patency: patent  Cardiovascular status: blood pressure returned to baseline and hemodynamically stable  Respiratory status: room air  Hydration status: euvolemic  PONV: none     Anesthetic complications: None          Last vitals:  Vitals:    03/23/18 0722   BP: 138/82   Pulse: 80   Resp: 16   Temp: 36.8  C (98.3  F)   SpO2: 96%         Electronically Signed By: Davis Sellers MD  March 23, 2018  2:55 PM  
Unable to assess

## 2024-05-04 ENCOUNTER — HEALTH MAINTENANCE LETTER (OUTPATIENT)
Age: 69
End: 2024-05-04

## 2024-07-13 ENCOUNTER — HEALTH MAINTENANCE LETTER (OUTPATIENT)
Age: 69
End: 2024-07-13

## 2024-09-14 NOTE — TELEPHONE ENCOUNTER
Called into pt room in regards to pt having the premedication for her CTA, advised her and her mother that I am waiting for attending to call back. Also the mother is inquiring about her TPN, advised them I  would call Dr. Murphy and ask about this.    Called patient and left voice mail asking if she would like me to contact Dr. Josseline Hernandez and ask her if she would be willing and able to videotape and do a tremor rating scale evaluating the patient's tremor on and off stimulation. Since Dr. Hernandez is in [place], she would not have to travel so far. Explained that I do not know if Dr. Hernandez is set up to do these types of evaluations but I am happy to look into it if she wishes. Told her to watch for my [place] message and answer it when she can.

## 2024-12-20 NOTE — NURSING NOTE
Chief Complaint   Patient presents with     RECHECK     UMP- MOVEMENT DISORDER/  DBS F/U     Dereje Santillan, JOSH     breathing is unlabored without accessory muscle use., normal breath sounds.

## 2025-02-09 ENCOUNTER — HEALTH MAINTENANCE LETTER (OUTPATIENT)
Age: 70
End: 2025-02-09

## 2025-04-04 NOTE — ANESTHESIA POSTPROCEDURE EVALUATION
Patient: Hilary Cornelius    Procedure(s):  Deep Brain Stimulator Placement, Phase II, Placement Of Deep Brain Stimulator Generator/Battery Over The Right Chest Wall - Wound Class: I-Clean    Diagnosis:Essential Tremor   Diagnosis Additional Information: No value filed.    Anesthesia Type:  General, ETT    Note:  Anesthesia Post Evaluation    Patient location during evaluation: PACU  Patient participation: Able to fully participate in evaluation  Level of consciousness: awake and alert  Pain management: satisfactory to patient  Airway patency: patent  Cardiovascular status: blood pressure returned to baseline and hemodynamically stable  Respiratory status: room air  Hydration status: euvolemic  PONV: none     Anesthetic complications: None          Last vitals:  Vitals:    04/06/18 1100 04/06/18 1115 04/06/18 1130   BP: 128/83 (!) 117/98 (!) 126/94   Pulse:      Resp: 10 10 13   Temp: 36.6  C (97.9  F) 36.9  C (98.4  F) 37  C (98.6  F)   SpO2: 98% 97% 97%         Electronically Signed By: Davis Sellers MD  April 6, 2018  11:42 AM     INTERVAL HPI/OVERNIGHT EVENTS:  Pt seen and examined at bedside. No acute overnight events or complaints.    VITAL SIGNS:  T(F): 98.5 (04-04-25 @ 05:00)  HR: 77 (04-04-25 @ 05:00)  BP: 135/45 (04-04-25 @ 05:00)  RR: 18 (04-04-25 @ 05:00)  SpO2: 98% (04-04-25 @ 05:00)  Wt(kg): --    PHYSICAL EXAM:    Constitutional: WDWN, NAD  HEENT: PERRL, EOMI, sclera non-icteric, neck supple, trachea midline, no masses, no JVD, MMM, good dentition  Respiratory: CTA b/l, good air entry b/l, no wheezing, no rhonchi, no rales, without accessory muscle use and no intercostal retractions  Cardiovascular: RRR, normal S1S2, no M/R/G  Gastrointestinal: soft, NTND, no masses palpable, BS normal  Extremities: Warm, well perfused, pulses equal bilateral upper and lower extremities, no edema, no clubbing. Capillary refill <2 sec  Neurological: AAOx3, CN Grossly intact  Skin: Normal temperature, warm, dry    MEDICATIONS  (STANDING):  atorvastatin 10 milliGRAM(s) Oral at bedtime  chlorhexidine 2% Cloths 1 Application(s) Topical daily  lidocaine   4% Patch 1 Patch Transdermal daily  pantoprazole  Injectable 40 milliGRAM(s) IV Push every 12 hours  polyethylene glycol 3350 17 Gram(s) Oral daily  potassium phosphate / sodium phosphate Powder (PHOS-NaK) 2 Packet(s) Oral once    MEDICATIONS  (PRN):  acetaminophen     Tablet .. 975 milliGRAM(s) Oral every 8 hours PRN Mild Pain (1 - 3), Moderate Pain (4 - 6)  morphine  - Injectable 2 milliGRAM(s) IV Push every 6 hours PRN Severe Pain (7 - 10)  ondansetron Injectable 4 milliGRAM(s) IV Push every 8 hours PRN Nausea and/or Vomiting      Allergies    tramadol (Vomiting)  penicillin (Rash)  aspirin (Other)  eggs (Rash)    Intolerances        LABS:                        9.4    10.83 )-----------( 216      ( 04 Apr 2025 05:35 )             28.0     04-03    139  |  107  |  25[H]  ----------------------------<  104[H]  4.0   |  20[L]  |  0.78    Ca    8.8      03 Apr 2025 06:38  Phos  2.2     04-03  Mg     2.10     04-03    TPro  6.3  /  Alb  3.2[L]  /  TBili  0.4  /  DBili  x   /  AST  22  /  ALT  13  /  AlkPhos  82  04-03      Urinalysis Basic - ( 03 Apr 2025 06:38 )    Color: x / Appearance: x / SG: x / pH: x  Gluc: 104 mg/dL / Ketone: x  / Bili: x / Urobili: x   Blood: x / Protein: x / Nitrite: x   Leuk Esterase: x / RBC: x / WBC x   Sq Epi: x / Non Sq Epi: x / Bacteria: x        RADIOLOGY & ADDITIONAL TESTS:  Reviewed      ******************  Authored By: Matt Schneider MD PGY1  Internal Medicine  MS Teams Preferred  ******************

## 2025-07-19 ENCOUNTER — HEALTH MAINTENANCE LETTER (OUTPATIENT)
Age: 70
End: 2025-07-19

## 2025-08-30 ENCOUNTER — HEALTH MAINTENANCE LETTER (OUTPATIENT)
Age: 70
End: 2025-08-30

## (undated) DEVICE — LINEN TOWEL PACK X30 5481

## (undated) DEVICE — DRAPE IOBAN ISOLATION VERTICAL 320X21CM 6617

## (undated) DEVICE — Device

## (undated) DEVICE — SUTURE BOOTS 051003PBX

## (undated) DEVICE — PREP SKIN SCRUB TRAY 4461A

## (undated) DEVICE — SU MONOCRYL 4-0 PS-2 27" UND Y426H

## (undated) DEVICE — DRAPE LAP PEDS DISP 29492

## (undated) DEVICE — SUCTION MANIFOLD DORNOCH ULTRA CART UL-CL500

## (undated) DEVICE — PACK SET-UP STD 9102

## (undated) DEVICE — PREP CHLORAPREP CLEAR 3ML 260400

## (undated) DEVICE — SU VICRYL 3-0 SH 8X18" UND J864D

## (undated) DEVICE — BLADE CLIPPER SGL USE 9680

## (undated) DEVICE — ESU ELEC BLADE 2.75" COATED/INSULATED E1455

## (undated) DEVICE — ESU GROUND PAD ADULT REM W/15' CORD E7507DB

## (undated) DEVICE — SU DERMABOND ADVANCED .7ML DNX12

## (undated) DEVICE — SPONGE COTTONOID 1/2X1/2" 20-04S

## (undated) DEVICE — DRAPE C-ARM W/STRAPS 42X72" 07-CA104

## (undated) DEVICE — CATH TRAY FOLEY SURESTEP 16FR W/URNE MTR STLK LATEX A303316A

## (undated) DEVICE — DRSG GAUZE 4X4" TRAY 6939

## (undated) DEVICE — PAD CHUX UNDERPAD 23X24" 7136

## (undated) DEVICE — NDL BLUNT 18GA 1" W/O FILTER 305181

## (undated) DEVICE — SU SILK 2-0 TIE 12X30" A305H

## (undated) DEVICE — PACK NEURO MINOR UMMC SNE32MNMU4

## (undated) DEVICE — SOL NACL 0.9% IRRIG 1000ML BOTTLE 2F7124

## (undated) DEVICE — SPONGE SURGIFOAM 100 1974

## (undated) DEVICE — WIPES FOLEY CARE SURESTEP PROVON DFC100

## (undated) DEVICE — LINEN TOWEL PACK X6 WHITE 5487

## (undated) DEVICE — LINEN TOWEL PACK X5 5464

## (undated) DEVICE — NDL 18GAX1.5" 305185

## (undated) DEVICE — SUCTION MANIFOLD NEPTUNE 2 SYS 4 PORT 0702-020-000

## (undated) DEVICE — PREP POVIDONE IODINE SOLUTION 10% 120ML

## (undated) DEVICE — GLOVE PROTEXIS MICRO 7.5  2D73PM75

## (undated) DEVICE — PREP CHLORAPREP 26ML TINTED ORANGE  260815

## (undated) DEVICE — TUBE GUIDE ALPHA OMEGA SYSTEM STR-007721-00

## (undated) DEVICE — SU VICRYL 0 CT-1 27" UND J260H

## (undated) DEVICE — GLOVE SENSICARE GREEN PF 8.0 LATEX FREE MSG1280

## (undated) DEVICE — DRAPE SHEET REV FOLD 3/4 9349

## (undated) DEVICE — JELLY LUBRICATING SURGILUBE 2OZ TUBE

## (undated) DEVICE — SYR 10ML FINGER CONTROL W/O NDL 309695

## (undated) DEVICE — PREP CHLORAPREP 26ML TINTED HI-LITE ORANGE 930815

## (undated) DEVICE — ESU GROUND PAD ADULT W/CORD E7507

## (undated) DEVICE — PREP POVIDONE IODINE SCRUB 7.5% 120ML

## (undated) DEVICE — PACK GOWN 3/PK DISP XL SBA32GPFCB

## (undated) DEVICE — PATIENT MANUAL AND MAGNET

## (undated) DEVICE — DRAPE IOBAN INCISE 23X17" 6650EZ

## (undated) DEVICE — PREP POVIDONE-IODINE 7.5% SCRUB 4OZ BOTTLE MDS093945

## (undated) DEVICE — GLOVE PROTEXIS BLUE W/NEU-THERA 8.0  2D73EB80

## (undated) DEVICE — DRAPE IOBAN INCISE 13X13" 6640EZ

## (undated) DEVICE — LABEL MEDICATION SYSTEM 3303-P

## (undated) DEVICE — HEADRING POINTS STEREOTACTIC DHRSL

## (undated) DEVICE — PREP POVIDONE IODINE SOLUTION 10% 4OZ BOTTLE 29906-004

## (undated) DEVICE — SOL WATER IRRIG 1000ML BOTTLE 2F7114

## (undated) DEVICE — ELEC MICROPHONICS-FREE ALPHA OMEGA STR-009080-00

## (undated) DEVICE — DECANTER VIAL 2006S

## (undated) DEVICE — NDL 25GA 2"  8881200441

## (undated) DEVICE — CABLE 5 CHANNEL INPUT  ALPHA OMEGA SYSTEM STR-000642-55

## (undated) DEVICE — GLOVE SENSICARE PI POWDER FREE 7.5 LATEX FREE MSG9075

## (undated) DEVICE — SU ETHIBOND 2-0 SHDA 30" X563H

## (undated) DEVICE — DRSG GAUZE 2X2" 8042

## (undated) DEVICE — DRSG PRIMAPORE 02X3" 7133

## (undated) DEVICE — ESU PENCIL W/ROCKER SWITCH BLADE HOLSTER E2350HDB

## (undated) DEVICE — COVER CAMERA VIDEO LASER 9X96" 04-CC227

## (undated) DEVICE — DRSG STERI STRIP 1/2X4" R1547

## (undated) DEVICE — PREP CHLORAPREP CLEAR 3ML 930400

## (undated) DEVICE — CABLE MULTI-LEAD TRIAL 3014ANS

## (undated) DEVICE — DRAPE U SPLIT 74X120" 29440

## (undated) DEVICE — ADH SKIN CLOSURE PREMIERPRO EXOFIN 1.0ML 3470

## (undated) RX ORDER — PHENYLEPHRINE HCL IN 0.9% NACL 1 MG/10 ML
SYRINGE (ML) INTRAVENOUS
Status: DISPENSED
Start: 2018-03-23

## (undated) RX ORDER — EPHEDRINE SULFATE 50 MG/ML
INJECTION, SOLUTION INTRAMUSCULAR; INTRAVENOUS; SUBCUTANEOUS
Status: DISPENSED
Start: 2018-03-23

## (undated) RX ORDER — ALBUTEROL SULFATE 0.83 MG/ML
SOLUTION RESPIRATORY (INHALATION)
Status: DISPENSED
Start: 2017-03-24

## (undated) RX ORDER — FENTANYL CITRATE 50 UG/ML
INJECTION, SOLUTION INTRAMUSCULAR; INTRAVENOUS
Status: DISPENSED
Start: 2017-03-24

## (undated) RX ORDER — LIDOCAINE HYDROCHLORIDE 20 MG/ML
INJECTION, SOLUTION EPIDURAL; INFILTRATION; INTRACAUDAL; PERINEURAL
Status: DISPENSED
Start: 2018-03-23

## (undated) RX ORDER — SODIUM CHLORIDE 9 MG/ML
INJECTION, SOLUTION INTRAVENOUS
Status: DISPENSED
Start: 2018-03-23

## (undated) RX ORDER — HYDROMORPHONE HYDROCHLORIDE 1 MG/ML
INJECTION, SOLUTION INTRAMUSCULAR; INTRAVENOUS; SUBCUTANEOUS
Status: DISPENSED
Start: 2017-03-14

## (undated) RX ORDER — HYDROMORPHONE HCL/0.9% NACL/PF 0.2MG/0.2
SYRINGE (ML) INTRAVENOUS
Status: DISPENSED
Start: 2018-04-06

## (undated) RX ORDER — HYDRALAZINE HYDROCHLORIDE 20 MG/ML
INJECTION INTRAMUSCULAR; INTRAVENOUS
Status: DISPENSED
Start: 2018-03-23

## (undated) RX ORDER — MANNITOL 20 G/100ML
INJECTION, SOLUTION INTRAVENOUS
Status: DISPENSED
Start: 2018-03-23

## (undated) RX ORDER — DEXAMETHASONE SODIUM PHOSPHATE 4 MG/ML
INJECTION, SOLUTION INTRA-ARTICULAR; INTRALESIONAL; INTRAMUSCULAR; INTRAVENOUS; SOFT TISSUE
Status: DISPENSED
Start: 2018-03-23

## (undated) RX ORDER — PROPOFOL 10 MG/ML
INJECTION, EMULSION INTRAVENOUS
Status: DISPENSED
Start: 2018-04-06

## (undated) RX ORDER — FENTANYL CITRATE 50 UG/ML
INJECTION, SOLUTION INTRAMUSCULAR; INTRAVENOUS
Status: DISPENSED
Start: 2018-03-23

## (undated) RX ORDER — BUPIVACAINE HYDROCHLORIDE 2.5 MG/ML
INJECTION, SOLUTION EPIDURAL; INFILTRATION; INTRACAUDAL
Status: DISPENSED
Start: 2017-03-24

## (undated) RX ORDER — CLINDAMYCIN PHOSPHATE 900 MG/50ML
INJECTION, SOLUTION INTRAVENOUS
Status: DISPENSED
Start: 2018-04-06

## (undated) RX ORDER — CLINDAMYCIN PHOSPHATE 900 MG/50ML
INJECTION, SOLUTION INTRAVENOUS
Status: DISPENSED
Start: 2018-03-23

## (undated) RX ORDER — BUPIVACAINE HYDROCHLORIDE 2.5 MG/ML
INJECTION, SOLUTION EPIDURAL; INFILTRATION; INTRACAUDAL
Status: DISPENSED
Start: 2017-03-14

## (undated) RX ORDER — GLYCOPYRROLATE 0.2 MG/ML
INJECTION, SOLUTION INTRAMUSCULAR; INTRAVENOUS
Status: DISPENSED
Start: 2018-03-23

## (undated) RX ORDER — BACITRACIN 50000 [IU]/1
INJECTION, POWDER, FOR SOLUTION INTRAMUSCULAR
Status: DISPENSED
Start: 2018-03-23

## (undated) RX ORDER — ACETAMINOPHEN 325 MG/1
TABLET ORAL
Status: DISPENSED
Start: 2017-03-24

## (undated) RX ORDER — BACITRACIN 50000 [IU]/1
INJECTION, POWDER, FOR SOLUTION INTRAMUSCULAR
Status: DISPENSED
Start: 2017-03-24

## (undated) RX ORDER — BUPIVACAINE HYDROCHLORIDE 2.5 MG/ML
INJECTION, SOLUTION EPIDURAL; INFILTRATION; INTRACAUDAL
Status: DISPENSED
Start: 2018-04-06

## (undated) RX ORDER — BACITRACIN 500 [USP'U]/G
OINTMENT OPHTHALMIC
Status: DISPENSED
Start: 2018-03-23

## (undated) RX ORDER — PROPOFOL 10 MG/ML
INJECTION, EMULSION INTRAVENOUS
Status: DISPENSED
Start: 2018-03-23

## (undated) RX ORDER — ONDANSETRON 2 MG/ML
INJECTION INTRAMUSCULAR; INTRAVENOUS
Status: DISPENSED
Start: 2018-04-06

## (undated) RX ORDER — BUPIVACAINE HYDROCHLORIDE AND EPINEPHRINE 2.5; 5 MG/ML; UG/ML
INJECTION, SOLUTION EPIDURAL; INFILTRATION; INTRACAUDAL; PERINEURAL
Status: DISPENSED
Start: 2017-03-24

## (undated) RX ORDER — CLINDAMYCIN PHOSPHATE 900 MG/50ML
INJECTION, SOLUTION INTRAVENOUS
Status: DISPENSED
Start: 2017-03-24

## (undated) RX ORDER — CLINDAMYCIN PHOSPHATE 900 MG/50ML
INJECTION, SOLUTION INTRAVENOUS
Status: DISPENSED
Start: 2022-11-18

## (undated) RX ORDER — BUPIVACAINE HYDROCHLORIDE 2.5 MG/ML
INJECTION, SOLUTION EPIDURAL; INFILTRATION; INTRACAUDAL
Status: DISPENSED
Start: 2018-03-23

## (undated) RX ORDER — BALANCED SALT SOLUTION 6.4; .75; .48; .3; 3.9; 1.7 MG/ML; MG/ML; MG/ML; MG/ML; MG/ML; MG/ML
SOLUTION OPHTHALMIC
Status: DISPENSED
Start: 2018-03-23

## (undated) RX ORDER — ONDANSETRON 2 MG/ML
INJECTION INTRAMUSCULAR; INTRAVENOUS
Status: DISPENSED
Start: 2018-03-23

## (undated) RX ORDER — BACITRACIN 50000 [IU]/1
INJECTION, POWDER, FOR SOLUTION INTRAMUSCULAR
Status: DISPENSED
Start: 2017-03-14

## (undated) RX ORDER — ACETAMINOPHEN 325 MG/1
TABLET ORAL
Status: DISPENSED
Start: 2022-11-18

## (undated) RX ORDER — LIDOCAINE HYDROCHLORIDE AND EPINEPHRINE 10; 10 MG/ML; UG/ML
INJECTION, SOLUTION INFILTRATION; PERINEURAL
Status: DISPENSED
Start: 2017-03-14

## (undated) RX ORDER — FENTANYL CITRATE 50 UG/ML
INJECTION, SOLUTION INTRAMUSCULAR; INTRAVENOUS
Status: DISPENSED
Start: 2018-04-06

## (undated) RX ORDER — EPHEDRINE SULFATE 50 MG/ML
INJECTION, SOLUTION INTRAMUSCULAR; INTRAVENOUS; SUBCUTANEOUS
Status: DISPENSED
Start: 2022-11-18

## (undated) RX ORDER — LIDOCAINE HYDROCHLORIDE AND EPINEPHRINE 10; 10 MG/ML; UG/ML
INJECTION, SOLUTION INFILTRATION; PERINEURAL
Status: DISPENSED
Start: 2018-04-06

## (undated) RX ORDER — LIDOCAINE HYDROCHLORIDE AND EPINEPHRINE 10; 10 MG/ML; UG/ML
INJECTION, SOLUTION INFILTRATION; PERINEURAL
Status: DISPENSED
Start: 2017-03-24

## (undated) RX ORDER — SODIUM CHLORIDE 9 MG/ML
INJECTION, SOLUTION INTRAVENOUS
Status: DISPENSED
Start: 2017-03-14

## (undated) RX ORDER — LIDOCAINE HYDROCHLORIDE 20 MG/ML
INJECTION, SOLUTION EPIDURAL; INFILTRATION; INTRACAUDAL; PERINEURAL
Status: DISPENSED
Start: 2018-04-06

## (undated) RX ORDER — BACITRACIN 50000 [IU]/1
INJECTION, POWDER, FOR SOLUTION INTRAMUSCULAR
Status: DISPENSED
Start: 2018-04-06

## (undated) RX ORDER — CLINDAMYCIN PHOSPHATE 900 MG/50ML
INJECTION, SOLUTION INTRAVENOUS
Status: DISPENSED
Start: 2017-03-14

## (undated) RX ORDER — GABAPENTIN 300 MG/1
CAPSULE ORAL
Status: DISPENSED
Start: 2017-03-24

## (undated) RX ORDER — LIDOCAINE HYDROCHLORIDE AND EPINEPHRINE 10; 10 MG/ML; UG/ML
INJECTION, SOLUTION INFILTRATION; PERINEURAL
Status: DISPENSED
Start: 2018-03-23